# Patient Record
Sex: FEMALE | Race: BLACK OR AFRICAN AMERICAN | Employment: PART TIME | URBAN - METROPOLITAN AREA
[De-identification: names, ages, dates, MRNs, and addresses within clinical notes are randomized per-mention and may not be internally consistent; named-entity substitution may affect disease eponyms.]

---

## 2017-01-26 ENCOUNTER — TELEPHONE (OUTPATIENT)
Dept: NEUROLOGY | Age: 32
End: 2017-01-26

## 2017-01-27 DIAGNOSIS — F42.9 OCD (OBSESSIVE COMPULSIVE DISORDER): ICD-10-CM

## 2017-01-27 RX ORDER — FLUVOXAMINE MALEATE 50 MG/1
50 TABLET ORAL
Qty: 30 TAB | Refills: 5 | Status: SHIPPED | OUTPATIENT
Start: 2017-01-27 | End: 2017-03-14 | Stop reason: SDUPTHER

## 2017-03-14 ENCOUNTER — OFFICE VISIT (OUTPATIENT)
Dept: NEUROLOGY | Age: 32
End: 2017-03-14

## 2017-03-14 VITALS
HEART RATE: 82 BPM | BODY MASS INDEX: 29.96 KG/M2 | SYSTOLIC BLOOD PRESSURE: 120 MMHG | HEIGHT: 62 IN | DIASTOLIC BLOOD PRESSURE: 80 MMHG | OXYGEN SATURATION: 98 % | WEIGHT: 162.8 LBS

## 2017-03-14 DIAGNOSIS — G93.49 STATIC ENCEPHALOPATHY: ICD-10-CM

## 2017-03-14 DIAGNOSIS — G40.909 SEIZURE DISORDER (HCC): ICD-10-CM

## 2017-03-14 DIAGNOSIS — G40.909 NONINTRACTABLE EPILEPSY WITHOUT STATUS EPILEPTICUS, UNSPECIFIED EPILEPSY TYPE (HCC): ICD-10-CM

## 2017-03-14 DIAGNOSIS — G93.49 CHRONIC STATIC ENCEPHALOPATHY: ICD-10-CM

## 2017-03-14 DIAGNOSIS — F42.9 OCD (OBSESSIVE COMPULSIVE DISORDER): ICD-10-CM

## 2017-03-14 RX ORDER — LORATADINE 10 MG/1
10 TABLET ORAL
COMMUNITY
End: 2017-03-14

## 2017-03-14 RX ORDER — NORGESTIMATE AND ETHINYL ESTRADIOL 7DAYSX3 28
KIT ORAL
COMMUNITY
End: 2017-03-14

## 2017-03-14 RX ORDER — FLUVOXAMINE MALEATE 50 MG/1
50 TABLET ORAL
Qty: 30 TAB | Refills: 5 | Status: SHIPPED | OUTPATIENT
Start: 2017-03-14 | End: 2017-03-14 | Stop reason: SDUPTHER

## 2017-03-14 RX ORDER — FLUTICASONE PROPIONATE 50 MCG
2 SPRAY, SUSPENSION (ML) NASAL DAILY
COMMUNITY
End: 2018-09-12

## 2017-03-14 RX ORDER — OXCARBAZEPINE 600 MG/1
600 TABLET, FILM COATED ORAL 2 TIMES DAILY
Qty: 60 TAB | Refills: 5 | Status: SHIPPED | OUTPATIENT
Start: 2017-03-14 | End: 2017-09-12 | Stop reason: SDUPTHER

## 2017-03-14 RX ORDER — FLUVOXAMINE MALEATE 50 MG/1
50 TABLET ORAL
Qty: 30 TAB | Refills: 5 | Status: SHIPPED | OUTPATIENT
Start: 2017-03-14 | End: 2017-09-12 | Stop reason: SDUPTHER

## 2017-03-14 NOTE — MR AVS SNAPSHOT
Visit Information Date & Time Provider Department Dept. Phone Encounter #  
 3/14/2017  1:40 PM Louie Grossman MD [facility] [phone] [id number] Upcoming Health Maintenance Date Due DTaP/Tdap/Td series (1 - Tdap) 3/8/2004 PAP AKA CERVICAL CYTOLOGY 3/8/2004 INFLUENZA AGE 9 TO ADULT 8/1/2016 Allergies as of 3/14/2017  Review Complete On: 3/14/2017 By: Louie Grossman MD  
  
 Severity Noted Reaction Type Reactions Bees [Sting, Bee]  10/08/2011   Systemic Anaphylaxis Pertussis Vaccine,adsorbed  05/08/2014    Other (comments) Current Immunizations  Never Reviewed No immunizations on file. Not reviewed this visit Vitals BP Pulse Height(growth percentile) Weight(growth percentile) SpO2 BMI  
 120/80 82 5' 2\" (1.575 m) 162 lb 12.8 oz (73.8 kg) 98% 29.78 kg/m2 OB Status Smoking Status Having regular periods Never Smoker BMI and BSA Data Body Mass Index Body Surface Area  
 29.78 kg/m 2 1.8 m 2 Preferred Pharmacy Pharmacy Name Phone [facility] [phone] Your Updated Medication List  
  
   
This list is accurate as of: 3/14/17  1:55 PM.  Always use your most recent med list.  
  
  
  
  
 CALCIUM + D PO Take  by mouth. fluticasone 50 mcg/actuation nasal spray Commonly known as:  Montine Farber 2 Sprays by Both Nostrils route daily. fluvoxaMINE 50 mg tablet Commonly known as:  Claude Federico Take 1 Tab by mouth nightly. multivit,Ca,min-D3-herbal #181 1,000 unit Tab Take 1 tablet by mouth daily. OXcarbaxepine 600 mg tablet Commonly known as:  TRILEPTAL Take 1 tablet by mouth two (2) times a day. Patient Instructions 1. Fu in 6 months A Healthy Lifestyle: Care Instructions Your Care Instructions A healthy lifestyle can help you feel good, stay at a healthy weight, and have plenty of energy for both work and play. A healthy lifestyle is something you can share with your whole family. A healthy lifestyle also can lower your risk for serious health problems, such as high blood pressure, heart disease, and diabetes. You can follow a few steps listed below to improve your health and the health of your family. Follow-up care is a key part of your treatment and safety. Be sure to make and go to all appointments, and call your doctor if you are having problems. Its also a good idea to know your test results and keep a list of the medicines you take. How can you care for yourself at home? · Do not eat too much sugar, fat, or fast foods. You can still have dessert and treats now and then. The goal is moderation. · Start small to improve your eating habits. Pay attention to portion sizes, drink less juice and soda pop, and eat more fruits and vegetables. ¨ Eat a healthy amount of food. A 3-ounce serving of meat, for example, is about the size of a deck of cards. Fill the rest of your plate with vegetables and whole grains. ¨ Limit the amount of soda and sports drinks you have every day. Drink more water when you are thirsty. ¨ Eat at least 5 servings of fruits and vegetables every day. It may seem like a lot, but it is not hard to reach this goal. A serving or helping is 1 piece of fruit, 1 cup of vegetables, or 2 cups of leafy, raw vegetables. Have an apple or some carrot sticks as an afternoon snack instead of a candy bar. Try to have fruits and/or vegetables at every meal. 
· Make exercise part of your daily routine. You may want to start with simple activities, such as walking, bicycling, or slow swimming. Try to be active 30 to 60 minutes every day. You do not need to do all 30 to 60 minutes all at once.  For example, you can exercise 3 times a day for 10 or 20 minutes. Moderate exercise is safe for most people, but it is always a good idea to talk to your doctor before starting an exercise program. 
· Keep moving. Sylvia Clearys the lawn, work in the garden, or [employer]. Take the stairs instead of the elevator at work. · If you smoke, quit. People who smoke have an increased risk for heart attack, stroke, cancer, and other lung illnesses. Quitting is hard, but there are ways to boost your chance of quitting tobacco for good. ¨ Use nicotine gum, patches, or lozenges. ¨ Ask your doctor about stop-smoking programs and medicines. ¨ Keep trying. In addition to reducing your risk of diseases in the future, you will notice some benefits soon after you stop using tobacco. If you have shortness of breath or asthma symptoms, they will likely get better within a few weeks after you quit. · Limit how much alcohol you drink. Moderate amounts of alcohol (up to 2 drinks a day for men, 1 drink a day for women) are okay. But drinking too much can lead to liver problems, high blood pressure, and other health problems. Family health If you have a family, there are many things you can do together to improve your health. · Eat meals together as a family as often as possible. · Eat healthy foods. This includes fruits, vegetables, lean meats and dairy, and whole grains. · Include your family in your fitness plan. Most people think of activities such as jogging or tennis as the way to fitness, but there are many ways you and your family can be more active. Anything that makes you breathe hard and gets your heart pumping is exercise. Here are some tips: 
¨ Walk to do errands or to take your child to school or the bus. ¨ Go for a family bike ride after dinner instead of watching TV. Where can you learn more? Go to http://nazia-brina.info/. Enter V905 in the search box to learn more about \"A Healthy Lifestyle: Care Instructions. \" Current as of: July 26, 2016 Content Version: 11.1 © 5455-7291 [employer]. Care instructions adapted under license by [employer] (which disclaims liability or warranty for this information). If you have questions about a medical condition or this instruction, always ask your healthcare professional. [address] any warranty or liability for your use of this information. Please provide this summary of care documentation to your next provider. Your primary care clinician is listed as Santo Martinez. If you have any questions after today's visit, please call [phone].

## 2017-03-14 NOTE — PATIENT INSTRUCTIONS
1. Fu in 6 months     A Healthy Lifestyle: Care Instructions  Your Care Instructions  A healthy lifestyle can help you feel good, stay at a healthy weight, and have plenty of energy for both work and play. A healthy lifestyle is something you can share with your whole family. A healthy lifestyle also can lower your risk for serious health problems, such as high blood pressure, heart disease, and diabetes. You can follow a few steps listed below to improve your health and the health of your family. Follow-up care is a key part of your treatment and safety. Be sure to make and go to all appointments, and call your doctor if you are having problems. Its also a good idea to know your test results and keep a list of the medicines you take. How can you care for yourself at home? · Do not eat too much sugar, fat, or fast foods. You can still have dessert and treats now and then. The goal is moderation. · Start small to improve your eating habits. Pay attention to portion sizes, drink less juice and soda pop, and eat more fruits and vegetables. ¨ Eat a healthy amount of food. A 3-ounce serving of meat, for example, is about the size of a deck of cards. Fill the rest of your plate with vegetables and whole grains. ¨ Limit the amount of soda and sports drinks you have every day. Drink more water when you are thirsty. ¨ Eat at least 5 servings of fruits and vegetables every day. It may seem like a lot, but it is not hard to reach this goal. A serving or helping is 1 piece of fruit, 1 cup of vegetables, or 2 cups of leafy, raw vegetables. Have an apple or some carrot sticks as an afternoon snack instead of a candy bar. Try to have fruits and/or vegetables at every meal.  · Make exercise part of your daily routine. You may want to start with simple activities, such as walking, bicycling, or slow swimming. Try to be active 30 to 60 minutes every day. You do not need to do all 30 to 60 minutes all at once.  For example, you can exercise 3 times a day for 10 or 20 minutes. Moderate exercise is safe for most people, but it is always a good idea to talk to your doctor before starting an exercise program.  · Keep moving. Jocelyne Breaux the lawn, work in the garden, or [employer]. Take the stairs instead of the elevator at work. · If you smoke, quit. People who smoke have an increased risk for heart attack, stroke, cancer, and other lung illnesses. Quitting is hard, but there are ways to boost your chance of quitting tobacco for good. ¨ Use nicotine gum, patches, or lozenges. ¨ Ask your doctor about stop-smoking programs and medicines. ¨ Keep trying. In addition to reducing your risk of diseases in the future, you will notice some benefits soon after you stop using tobacco. If you have shortness of breath or asthma symptoms, they will likely get better within a few weeks after you quit. · Limit how much alcohol you drink. Moderate amounts of alcohol (up to 2 drinks a day for men, 1 drink a day for women) are okay. But drinking too much can lead to liver problems, high blood pressure, and other health problems. Family health  If you have a family, there are many things you can do together to improve your health. · Eat meals together as a family as often as possible. · Eat healthy foods. This includes fruits, vegetables, lean meats and dairy, and whole grains. · Include your family in your fitness plan. Most people think of activities such as jogging or tennis as the way to fitness, but there are many ways you and your family can be more active. Anything that makes you breathe hard and gets your heart pumping is exercise. Here are some tips:  ¨ Walk to do errands or to take your child to school or the bus. ¨ Go for a family bike ride after dinner instead of watching TV. Where can you learn more? Go to http://nazia-brina.info/.   Enter E363 in the search box to learn more about \"A Healthy Lifestyle: Care Instructions. \"  Current as of: July 26, 2016  Content Version: 11.1  © 2279-7448 [employer], [facility]. Care instructions adapted under license by [employer] (which disclaims liability or warranty for this information). If you have questions about a medical condition or this instruction, always ask your healthcare professional. [place] [address] any warranty or liability for your use of this information.

## 2017-03-14 NOTE — LETTER
3/14/2017 1:57 PM 
 
Patient:    Olga Husain YOB: 1985 Date of Visit:    3/14/2017 Dear Zaira Lovell MD 
 
Thank you for referring Ms. Olga Husain to me for evaluation/treatment. Below are the relevant portions of my assessment and plan of care. NEUROLOGY FOLLOW UP NOTE 
 
03/14/17 Chief Complaint Patient presents with  Follow-up Seizure none current Subjective Olga Husain is a 27 y.o. female who presented to the neurology office for management of seizures. She does have [facility] syndrome and was seizure free from [age] yrs of age. She is doing well on trileptal 600 mg po bid. She has not had a seizure in the last 2-3 yrs. She does have OCD and is on fluvoxamine 50 mg a day. She has gained 10 lbs in the last 6 months. She drinks 2 cans of soda a day and also is obsessed with chocolate. Current Outpatient Prescriptions Medication Sig  
 fluticasone (FLONASE) 50 mcg/actuation nasal spray 2 Sprays by Both Nostrils route daily.  fluvoxaMINE (LUVOX) 50 mg tablet Take 1 Tab by mouth nightly.  OXcarbaxepine (TRILEPTAL) 600 mg tablet Take 1 tablet by mouth two (2) times a day.  multivit,Ca,min-D3-herbal #181 1,000 unit tab Take 1 tablet by mouth daily.  CALCIUM CARBONATE/VITAMIN D3 (CALCIUM + D PO) Take  by mouth. No current facility-administered medications for this visit. Allergies Allergen Reactions  Bees [Sting, Bee] Anaphylaxis  Pertussis Vaccine,Adsorbed Other (comments) REVIEW OF SYSTEMS:  
A ten system review of constitutional, cardiovascular, respiratory, musculoskeletal, endocrine, skin, SHEENT, genitourinary, psychiatric and neurologic systems was obtained and is unremarkable except as stated in HPI EXAMINATION:  
Visit Vitals  /80  Pulse 82  Ht 5' 2\" (1.575 m)  Wt 162 lb 12.8 oz (73.8 kg)  SpO2 98%  BMI 29.78 kg/m2 General:  
General appearance: Pt is in no acute distress Distal pulses are preserved Neurological Examination:  
Mental Status: AA. Speech is fluent. Follows commands, has normal fund of knowledge, attention, short term recall, comprehension and insight. Cranial Nerves: Visual fields are full. PERRL, Extraocular movements are full. Facial sensation intact V1- V3. Facial movement intact, symmetric. Hearing intact to conversation. Palate elevates symmetrically. Shoulder shrug symmetric. Tongue midline. Motor: Strength is 5/5 in all 4 ext. Sensation: Normal to light touch Coordination/Cerebellar: Intact to finger-nose-finger Laboratory review: No results found for this or any previous visit. Imaging review:  
 
2/19/2013: EEG had right temporal sharp waves that were improved from spike and wave complexes. Documentation review: 
None Assessment/Plan:  
Morenita Kenney is a 27 y.o. 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 She resumed having seizures and is on trileptal 600 mg po bid. She is doing well with no seizures in the last 11 yrs. Will continue the same. Continue luvox for her OCD. Pt is obsessed with chocolates and soda. Pt will benefit by seeing psychiatry. I counseled the pt about diet. Fu in 6 months. 1. Chronic static encephalopathy Stable 2. Convulsions, unspecified convulsion type ([address] No seizures on trileptal 600 mg po bid. Will continue the same. 3. OCD (obsessive compulsive disorder) Her luvox was refilled for 6 months. 
 
- fluvoxaMINE (LUVOX) 50 mg tablet; Take 1 Tab by mouth nightly. Dispense: 30 Tab; Refill: 5 Fu in 6 months. Over 30 minutes was spent with the patient and family of which > 50% of the visit was spent counseling on diagnosis, management, and treatment of the diagnosis This note will not be viewable in [address] If you have questions, please do not hesitate to call me.   I look forward to following Ms. Roblero along with you. Sincerely, Christina Bansal MD

## 2017-03-14 NOTE — PROGRESS NOTES
NEUROLOGY FOLLOW UP NOTE    03/14/17    Chief Complaint   Patient presents with    Follow-up     Seizure none current       Subjective  Lissy Cuellar is a 27 y.o. female who presented to the neurology office for management of seizures. 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 She is doing well on trileptal 600 mg po bid. She has not had a seizure in the last 2-3 yrs. She does have OCD and is on fluvoxamine 50 mg a day. She has gained 10 lbs in the last 6 months. She drinks 2 cans of soda a day and also is obsessed with chocolate. Current Outpatient Prescriptions   Medication Sig    fluticasone (FLONASE) 50 mcg/actuation nasal spray 2 Sprays by Both Nostrils route daily.  fluvoxaMINE (LUVOX) 50 mg tablet Take 1 Tab by mouth nightly.  OXcarbaxepine (TRILEPTAL) 600 mg tablet Take 1 tablet by mouth two (2) times a day.  multivit,Ca,min-D3-herbal #181 1,000 unit tab Take 1 tablet by mouth daily.  CALCIUM CARBONATE/VITAMIN D3 (CALCIUM + D PO) Take  by mouth. No current facility-administered medications for this visit. Allergies   Allergen Reactions    Bees [Sting, Bee] Anaphylaxis    Pertussis Vaccine,Adsorbed Other (comments)       REVIEW OF SYSTEMS:   A ten system review of constitutional, cardiovascular, respiratory, musculoskeletal, endocrine, skin, SHEENT, genitourinary, psychiatric and neurologic systems was obtained and is unremarkable except as stated in HPI    EXAMINATION:   Visit Vitals    /80    Pulse 82    Ht 5' 2\" (1.575 m)    Wt 162 lb 12.8 oz (73.8 kg)    SpO2 98%    BMI 29.78 kg/m2        General:   General appearance: Pt is in no acute distress   Distal pulses are preserved    Neurological Examination:   Mental Status: AA. Speech is fluent. Follows commands, has normal fund of knowledge, attention, short term recall, comprehension and insight. Cranial Nerves: Visual fields are full. PERRL, Extraocular movements are full. Facial sensation intact V1- V3. Facial movement intact, symmetric. Hearing intact to conversation. Palate elevates symmetrically. Shoulder shrug symmetric. Tongue midline. Motor: Strength is 5/5 in all 4 ext. Sensation: Normal to light touch    Coordination/Cerebellar: Intact to finger-nose-finger     Laboratory review:   No results found for this or any previous visit. Imaging review:     2/19/2013: EEG had right temporal sharp waves that were improved from spike and wave complexes. Documentation review:  None    Assessment/Plan:   Mary Lundy is a 27 y.o. 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 She resumed having seizures and is on trileptal 600 mg po bid. She is doing well with no seizures in the last 11 yrs. Will continue the same. Continue luvox for her OCD. Pt is obsessed with chocolates and soda. Pt will benefit by seeing psychiatry. I counseled the pt about diet. Fu in 6 months. 1. Chronic static encephalopathy  Stable    2. Convulsions, unspecified convulsion type ([address]  No seizures on trileptal 600 mg po bid. Will continue the same. 3. OCD (obsessive compulsive disorder)  Her luvox was refilled for 6 months.    - fluvoxaMINE (LUVOX) 50 mg tablet; Take 1 Tab by mouth nightly. Dispense: 30 Tab; Refill: 5    Fu in 6 months. Over 30 minutes was spent with the patient and family of which > 50% of the visit was spent counseling on diagnosis, management, and treatment of the diagnosis      This note will not be viewable in [address]

## 2017-09-12 ENCOUNTER — OFFICE VISIT (OUTPATIENT)
Dept: NEUROLOGY | Age: 32
End: 2017-09-12

## 2017-09-12 VITALS
WEIGHT: 161.6 LBS | DIASTOLIC BLOOD PRESSURE: 72 MMHG | OXYGEN SATURATION: 94 % | SYSTOLIC BLOOD PRESSURE: 112 MMHG | HEART RATE: 76 BPM | HEIGHT: 62 IN | BODY MASS INDEX: 29.74 KG/M2

## 2017-09-12 DIAGNOSIS — G40.909 NONINTRACTABLE EPILEPSY WITHOUT STATUS EPILEPTICUS, UNSPECIFIED EPILEPSY TYPE (HCC): ICD-10-CM

## 2017-09-12 DIAGNOSIS — F42.9 OBSESSIVE-COMPULSIVE DISORDER, UNSPECIFIED TYPE: ICD-10-CM

## 2017-09-12 DIAGNOSIS — G93.49 STATIC ENCEPHALOPATHY: ICD-10-CM

## 2017-09-12 DIAGNOSIS — G93.49 CHRONIC STATIC ENCEPHALOPATHY: ICD-10-CM

## 2017-09-12 DIAGNOSIS — G40.909 SEIZURE DISORDER (HCC): ICD-10-CM

## 2017-09-12 RX ORDER — OXCARBAZEPINE 600 MG/1
600 TABLET, FILM COATED ORAL 2 TIMES DAILY
Qty: 60 TAB | Refills: 11 | Status: SHIPPED | OUTPATIENT
Start: 2017-09-12 | End: 2018-08-20 | Stop reason: SDUPTHER

## 2017-09-12 RX ORDER — LORATADINE 10 MG/1
10 TABLET ORAL
COMMUNITY
End: 2018-08-15

## 2017-09-12 RX ORDER — FLUVOXAMINE MALEATE 50 MG/1
50 TABLET ORAL
Qty: 30 TAB | Refills: 11 | Status: SHIPPED | OUTPATIENT
Start: 2017-09-12 | End: 2018-04-09 | Stop reason: SDUPTHER

## 2017-09-12 RX ORDER — MEDROXYPROGESTERONE ACETATE 150 MG/ML
150 INJECTION, SUSPENSION INTRAMUSCULAR ONCE
COMMUNITY
End: 2018-08-15

## 2017-09-12 NOTE — MR AVS SNAPSHOT
Visit Information Date & Time Provider Department Dept. Phone Encounter #  
 9/12/2017 10:40 AM Teto Tello MD [facility]rology [facility] at [facility] [phone] [id number] Upcoming Health Maintenance Date Due DTaP/Tdap/Td series (1 - Tdap) 3/8/2004 PAP AKA CERVICAL CYTOLOGY 3/8/2004 INFLUENZA AGE 9 TO ADULT 8/1/2017 Allergies as of 9/12/2017  Review Complete On: 9/12/2017 By: Teto Tello MD  
  
 Severity Noted Reaction Type Reactions Bees [Sting, Bee]  10/08/2011   Systemic Anaphylaxis Pertussis Vaccine,adsorbed  05/08/2014    Other (comments) Current Immunizations  Never Reviewed No immunizations on file. Not reviewed this visit You Were Diagnosed With   
  
 Codes Comments Chronic static encephalopathy     ICD-10-CM: G93.49 
ICD-9-CM: 348.39 Nonintractable epilepsy without status epilepticus, unspecified epilepsy type ([facility]     ICD-10-CM: G40.909 ICD-9-CM: 345.90 Static encephalopathy     ICD-10-CM: G93.49 
ICD-9-CM: 742.9 Seizure disorder ([facility]     ICD-10-CM: G40.909 ICD-9-CM: 345.90 Obsessive-compulsive disorder, unspecified type     ICD-10-CM: F42.9 ICD-9-CM: 300.3 Vitals BP Pulse Height(growth percentile) Weight(growth percentile) SpO2 BMI  
 112/72 76 5' 2\" (1.575 m) 161 lb 9.6 oz (73.3 kg) 94% 29.56 kg/m2 OB Status Smoking Status Having regular periods Never Smoker BMI and BSA Data Body Mass Index Body Surface Area  
 29.56 kg/m 2 1.79 m 2 Preferred Pharmacy Pharmacy Name Phone [facility] [phone] Your Updated Medication List  
  
   
This list is accurate as of: 9/12/17 11:36 AM.  Always use your most recent med list.  
  
  
  
  
 fluticasone 50 mcg/actuation nasal spray Commonly known as:  Mo Kenyon 2 Sprays by Both Nostrils route daily. fluvoxaMINE 50 mg tablet Commonly known as:  Mila Sterling Take 1 Tab by mouth nightly. loratadine 10 mg tablet Commonly known as:  Chaya Quijano Take 10 mg by mouth.  
  
 medroxyPROGESTERone 150 mg/mL injection Commonly known as:  DEPO-PROVERA  
150 mg by IntraMUSCular route once. OXcarbaxepine 600 mg tablet Commonly known as:  TRILEPTAL Take 1 Tab by mouth two (2) times a day. TRINESSA (28) PO Take  by mouth. Prescriptions Sent to Pharmacy Refills OXcarbaxepine (TRILEPTAL) 600 mg tablet 11 Sig: Take 1 Tab by mouth two (2) times a day. Class: Normal  
 Pharmacy: [address] [place] [address] Ph #: [phone] Route: Oral  
 fluvoxaMINE (LUVOX) 50 mg tablet 11 Sig: Take 1 Tab by mouth nightly. Class: Normal  
 Pharmacy: [address] [place] [address] Ph #: [phone] Route: Oral  
  
Patient Instructions   
follow up in 1 yr A Healthy Lifestyle: Care Instructions Your Care Instructions A healthy lifestyle can help you feel good, stay at a healthy weight, and have plenty of energy for both work and play. A healthy lifestyle is something you can share with your whole family. A healthy lifestyle also can lower your risk for serious health problems, such as high blood pressure, heart disease, and diabetes. You can follow a few steps listed below to improve your health and the health of your family. Follow-up care is a key part of your treatment and safety. Be sure to make and go to all appointments, and call your doctor if you are having problems. Its also a good idea to know your test results and keep a list of the medicines you take. How can you care for yourself at home? · Do not eat too much sugar, fat, or fast foods. You can still have dessert and treats now and then. The goal is moderation. · Start small to improve your eating habits.  Pay attention to portion sizes, drink less juice and soda pop, and eat more fruits and vegetables. ¨ Eat a healthy amount of food. A 3-ounce serving of meat, for example, is about the size of a deck of cards. Fill the rest of your plate with vegetables and whole grains. ¨ Limit the amount of soda and sports drinks you have every day. Drink more water when you are thirsty. ¨ Eat at least 5 servings of fruits and vegetables every day. It may seem like a lot, but it is not hard to reach this goal. A serving or helping is 1 piece of fruit, 1 cup of vegetables, or 2 cups of leafy, raw vegetables. Have an apple or some carrot sticks as an afternoon snack instead of a candy bar. Try to have fruits and/or vegetables at every meal. 
· Make exercise part of your daily routine. You may want to start with simple activities, such as walking, bicycling, or slow swimming. Try to be active 30 to 60 minutes every day. You do not need to do all 30 to 60 minutes all at once. For example, you can exercise 3 times a day for 10 or 20 minutes. Moderate exercise is safe for most people, but it is always a good idea to talk to your doctor before starting an exercise program. 
· Keep moving. Stephanie Mike the lawn, work in the garden, or Strobe. Take the stairs instead of the elevator at work. · If you smoke, quit. People who smoke have an increased risk for heart attack, stroke, cancer, and other lung illnesses. Quitting is hard, but there are ways to boost your chance of quitting tobacco for good. ¨ Use nicotine gum, patches, or lozenges. ¨ Ask your doctor about stop-smoking programs and medicines. ¨ Keep trying. In addition to reducing your risk of diseases in the future, you will notice some benefits soon after you stop using tobacco. If you have shortness of breath or asthma symptoms, they will likely get better within a few weeks after you quit. · Limit how much alcohol you drink.  Moderate amounts of alcohol (up to 2 drinks a day for men, 1 drink a day for women) are okay. But drinking too much can lead to liver problems, high blood pressure, and other health problems. Family health If you have a family, there are many things you can do together to improve your health. · Eat meals together as a family as often as possible. · Eat healthy foods. This includes fruits, vegetables, lean meats and dairy, and whole grains. · Include your family in your fitness plan. Most people think of activities such as jogging or tennis as the way to fitness, but there are many ways you and your family can be more active. Anything that makes you breathe hard and gets your heart pumping is exercise. Here are some tips: 
¨ Walk to do errands or to take your child to school or the bus. ¨ Go for a family bike ride after dinner instead of watching TV. Where can you learn more? Go to http://nazia-brina.info/. Enter F562 in the search box to learn more about \"A Healthy Lifestyle: Care Instructions. \" Current as of: July 26, 2016 Content Version: 11.3 © 8414-0340 ViewReple. Care instructions adapted under license by [employer] (which disclaims liability or warranty for this information). If you have questions about a medical condition or this instruction, always ask your healthcare professional. [address] any warranty or liability for your use of this information. Introducing [facility]! Dear Manjeet Alexandre: Thank you for requesting a Grivy account. Our records indicate that you have previously registered for a Grivy account but its currently inactive. Please call our Grivy support line at [phone]. Additional Information If you have questions, please visit the Frequently Asked Questions section of the Grivy website at [email or web address] Remember, Grivy is NOT to be used for urgent needs. For medical emergencies, dial 911. Now available from your iPhone and Android! Please provide this summary of care documentation to your next provider. Your primary care clinician is listed as Leah Peña. If you have any questions after today's visit, please call [phone].

## 2017-09-12 NOTE — LETTER
9/12/2017 11:39 AM 
 
Patient:    Gayathri Oates YOB: 1985 Date of Visit:    9/12/2017 Dear Olesya Ring MD 
 
Thank you for referring Ms. Gayathri Oates to me for evaluation/treatment. Below are the relevant portions of my assessment and plan of care. NEUROLOGY FOLLOW UP NOTE 
 
09/12/17 Chief Complaint Patient presents with  Follow-up Seizures none Subjective Gayathri Oates is a 27 y.o. female who presented to the neurology office for management of seizures. She does have [place] syndrome and was seizure free from [age] yrs of age. She is doing well on trileptal 600 mg po bid. She has not had a seizure atleast since 2013. She does have OCD and is on fluvoxamine 50 mg a day. She has gained 10 lbs in the last 6 months. She drinks 2 cans of soda a day and also is obsessed with chocolate. Current Outpatient Prescriptions Medication Sig  
 medroxyPROGESTERone (DEPO-PROVERA) 150 mg/mL injection 150 mg by IntraMUSCular route once.  loratadine (CLARITIN) 10 mg tablet Take 10 mg by mouth.  NORGESTIMATE-ETHINYL ESTRADIOL (TRINESSA, 28, PO) Take  by mouth.  OXcarbaxepine (TRILEPTAL) 600 mg tablet Take 1 Tab by mouth two (2) times a day.  fluvoxaMINE (LUVOX) 50 mg tablet Take 1 Tab by mouth nightly.  fluticasone (FLONASE) 50 mcg/actuation nasal spray 2 Sprays by Both Nostrils route daily. No current facility-administered medications for this visit. Allergies Allergen Reactions  Bees [Sting, Bee] Anaphylaxis  Pertussis Vaccine,Adsorbed Other (comments) REVIEW OF SYSTEMS:  
A ten system review of constitutional, cardiovascular, respiratory, musculoskeletal, endocrine, skin, SHEENT, genitourinary, psychiatric and neurologic systems was obtained and is unremarkable except as stated in HPI EXAMINATION:  
Visit Vitals  /72  Pulse 76  Ht 5' 2\" (1.575 m)  Wt 161 lb 9.6 oz (73.3 kg)  SpO2 94%  BMI 29.56 kg/m2 General:  
General appearance: Pt is in no acute distress Distal pulses are preserved Neurological Examination:  
Mental Status: AA. Speech is fluent. Follows commands, has normal fund of knowledge, attention, short term recall, comprehension and insight. Cranial Nerves: Visual fields are full. PERRL, Extraocular movements are full. Facial sensation intact V1- V3. Facial movement intact, symmetric. Hearing intact to conversation. Palate elevates symmetrically. Shoulder shrug symmetric. Tongue midline. Motor: Strength is 5/5 in all 4 ext. Sensation: Normal to light touch Coordination/Cerebellar: Intact to finger-nose-finger Laboratory review: No results found for this or any previous visit. Imaging review:  
 
2/19/2013: EEG had right temporal sharp waves that were improved from spike and wave complexes. Documentation review: 
None Assessment/Plan:  
Sara Waldrop is a 27 y.o. 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 She resumed having seizures and is on trileptal 600 mg po bid. She is doing well with no seizures atleast since 2013. Will continue the same meds. Juan Luislety Amy Continue luvox for her OCD. Fu in 12 months. ICD-10-CM ICD-9-CM 1. Chronic static encephalopathy G93.49 348.39 OXcarbaxepine (TRILEPTAL) 600 mg tablet 2. Nonintractable epilepsy without status epilepticus, unspecified epilepsy type ([facility] G40.909 345.90 OXcarbaxepine (TRILEPTAL) 600 mg tablet 3. Static encephalopathy G93.49 742.9 OXcarbaxepine (TRILEPTAL) 600 mg tablet 4. Seizure disorder (HCC) G40.909 345.90 OXcarbaxepine (TRILEPTAL) 600 mg tablet 5. Obsessive-compulsive disorder, unspecified type F42.9 300.3 fluvoxaMINE (LUVOX) 50 mg tablet This note will not be viewable in [address] If you have questions, please do not hesitate to call me. I look forward to following Ms. Roblero along with you.  
 
Sincerely, 
 Jarett Graves MD

## 2017-09-12 NOTE — PATIENT INSTRUCTIONS
follow up in 1 yr     A Healthy Lifestyle: Care Instructions  Your Care Instructions  A healthy lifestyle can help you feel good, stay at a healthy weight, and have plenty of energy for both work and play. A healthy lifestyle is something you can share with your whole family. A healthy lifestyle also can lower your risk for serious health problems, such as high blood pressure, heart disease, and diabetes. You can follow a few steps listed below to improve your health and the health of your family. Follow-up care is a key part of your treatment and safety. Be sure to make and go to all appointments, and call your doctor if you are having problems. Its also a good idea to know your test results and keep a list of the medicines you take. How can you care for yourself at home? · Do not eat too much sugar, fat, or fast foods. You can still have dessert and treats now and then. The goal is moderation. · Start small to improve your eating habits. Pay attention to portion sizes, drink less juice and soda pop, and eat more fruits and vegetables. ¨ Eat a healthy amount of food. A 3-ounce serving of meat, for example, is about the size of a deck of cards. Fill the rest of your plate with vegetables and whole grains. ¨ Limit the amount of soda and sports drinks you have every day. Drink more water when you are thirsty. ¨ Eat at least 5 servings of fruits and vegetables every day. It may seem like a lot, but it is not hard to reach this goal. A serving or helping is 1 piece of fruit, 1 cup of vegetables, or 2 cups of leafy, raw vegetables. Have an apple or some carrot sticks as an afternoon snack instead of a candy bar. Try to have fruits and/or vegetables at every meal.  · Make exercise part of your daily routine. You may want to start with simple activities, such as walking, bicycling, or slow swimming. Try to be active 30 to 60 minutes every day. You do not need to do all 30 to 60 minutes all at once.  For example, you can exercise 3 times a day for 10 or 20 minutes. Moderate exercise is safe for most people, but it is always a good idea to talk to your doctor before starting an exercise program.  · Keep moving. Valarie Bi the lawn, work in the garden, or Campaign [employer]. Take the stairs instead of the elevator at work. · If you smoke, quit. People who smoke have an increased risk for heart attack, stroke, cancer, and other lung illnesses. Quitting is hard, but there are ways to boost your chance of quitting tobacco for good. ¨ Use nicotine gum, patches, or lozenges. ¨ Ask your doctor about stop-smoking programs and medicines. ¨ Keep trying. In addition to reducing your risk of diseases in the future, you will notice some benefits soon after you stop using tobacco. If you have shortness of breath or asthma symptoms, they will likely get better within a few weeks after you quit. · Limit how much alcohol you drink. Moderate amounts of alcohol (up to 2 drinks a day for men, 1 drink a day for women) are okay. But drinking too much can lead to liver problems, high blood pressure, and other health problems. Family health  If you have a family, there are many things you can do together to improve your health. · Eat meals together as a family as often as possible. · Eat healthy foods. This includes fruits, vegetables, lean meats and dairy, and whole grains. · Include your family in your fitness plan. Most people think of activities such as jogging or tennis as the way to fitness, but there are many ways you and your family can be more active. Anything that makes you breathe hard and gets your heart pumping is exercise. Here are some tips:  ¨ Walk to do errands or to take your child to school or the bus. ¨ Go for a family bike ride after dinner instead of watching TV. Where can you learn more? Go to http://nazia-brina.info/.   Enter Y117 in the search box to learn more about \"A Healthy Lifestyle: Care Instructions. \"  Current as of: July 26, 2016  Content Version: 11.3  © 6991-2020 [employer], [facility]. Care instructions adapted under license by [employer] (which disclaims liability or warranty for this information). If you have questions about a medical condition or this instruction, always ask your healthcare professional. [place] [address] any warranty or liability for your use of this information.

## 2017-09-12 NOTE — PROGRESS NOTES
NEUROLOGY FOLLOW UP NOTE    09/12/17    Chief Complaint   Patient presents with    Follow-up     Seizures none       Subjective  Mae Dozier is a 27 y.o. female who presented to the neurology office for management of seizures. She does have [place] syndrome and was seizure free from [age] yrs of age. She is doing well on trileptal 600 mg po bid. She has not had a seizure atleast since 2013. She does have OCD and is on fluvoxamine 50 mg a day. She has gained 10 lbs in the last 6 months. She drinks 2 cans of soda a day and also is obsessed with chocolate. Current Outpatient Prescriptions   Medication Sig    medroxyPROGESTERone (DEPO-PROVERA) 150 mg/mL injection 150 mg by IntraMUSCular route once.  loratadine (CLARITIN) 10 mg tablet Take 10 mg by mouth.  NORGESTIMATE-ETHINYL ESTRADIOL (TRINESSA, 28, PO) Take  by mouth.  OXcarbaxepine (TRILEPTAL) 600 mg tablet Take 1 Tab by mouth two (2) times a day.  fluvoxaMINE (LUVOX) 50 mg tablet Take 1 Tab by mouth nightly.  fluticasone (FLONASE) 50 mcg/actuation nasal spray 2 Sprays by Both Nostrils route daily. No current facility-administered medications for this visit. Allergies   Allergen Reactions    Bees [Sting, Bee] Anaphylaxis    Pertussis Vaccine,Adsorbed Other (comments)       REVIEW OF SYSTEMS:   A ten system review of constitutional, cardiovascular, respiratory, musculoskeletal, endocrine, skin, SHEENT, genitourinary, psychiatric and neurologic systems was obtained and is unremarkable except as stated in HPI    EXAMINATION:   Visit Vitals    /72    Pulse 76    Ht 5' 2\" (1.575 m)    Wt 161 lb 9.6 oz (73.3 kg)    SpO2 94%    BMI 29.56 kg/m2        General:   General appearance: Pt is in no acute distress   Distal pulses are preserved    Neurological Examination:   Mental Status: AA. Speech is fluent. Follows commands, has normal fund of knowledge, attention, short term recall, comprehension and insight.      Cranial Nerves: Visual fields are full. PERRL, Extraocular movements are full. Facial sensation intact V1- V3. Facial movement intact, symmetric. Hearing intact to conversation. Palate elevates symmetrically. Shoulder shrug symmetric. Tongue midline. Motor: Strength is 5/5 in all 4 ext. Sensation: Normal to light touch    Coordination/Cerebellar: Intact to finger-nose-finger     Laboratory review:   No results found for this or any previous visit. Imaging review:     2/19/2013: EEG had right temporal sharp waves that were improved from spike and wave complexes. Documentation review:  None    Assessment/Plan:   Tra Marr is a 27 y.o. 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 She resumed having seizures and is on trileptal 600 mg po bid. She is doing well with no seizures atleast since 2013. Will continue the same meds. .    Continue luvox for her OCD. Fu in 12 months. ICD-10-CM ICD-9-CM    1. Chronic static encephalopathy G93.49 348.39 OXcarbaxepine (TRILEPTAL) 600 mg tablet   2. Nonintractable epilepsy without status epilepticus, unspecified epilepsy type ([facility] G40.909 345.90 OXcarbaxepine (TRILEPTAL) 600 mg tablet   3. Static encephalopathy G93.49 742.9 OXcarbaxepine (TRILEPTAL) 600 mg tablet   4. Seizure disorder (HCC) G40.909 345.90 OXcarbaxepine (TRILEPTAL) 600 mg tablet   5. Obsessive-compulsive disorder, unspecified type F42.9 300.3 fluvoxaMINE (LUVOX) 50 mg tablet       This note will not be viewable in [facility] [employer].

## 2018-04-06 ENCOUNTER — TELEPHONE (OUTPATIENT)
Dept: NEUROLOGY | Age: 33
End: 2018-04-06

## 2018-04-06 NOTE — TELEPHONE ENCOUNTER
Spoke with patient's mother Ms. Barcenas to let her known that I will forward her concern regarding patient to Dr. Ayush Garcia. Mother states she will be in the area on Tuesday and will bring daughter by to update her HIPPA form. Mother states patient (daughter) lives in a facility and she has mental issues that she would like to talk to Dr. Ayush Garcia about her medication fluvoxamine (Luvox).

## 2018-04-06 NOTE — TELEPHONE ENCOUNTER
Spoke with  regarding patient HIPPA being . Patient HIPPA form was done on 3/14/2017. I will call patient to let her known that to come to the office and update her HIPPA form before her next appointment on 18.

## 2018-04-06 NOTE — TELEPHONE ENCOUNTER
Dr. Yanet Rothman patient's mother Ms. Roblero would like to talk to you regarding patient weight gain, acting out, stealing other people food and depression. Want to discuss possibly discontinuing the medication Fluvoxamine (Luvox). She can be reached at [phone].

## 2018-04-06 NOTE — TELEPHONE ENCOUNTER
Received call from patient's mother, she stated that she is concerned that one of the medications that the patient is taking is causing her to gain weight, she also stated that she believes it is causing her to act out, steal other people's food, and depression. She would like a call back to discuss possibily discontinuing the medication.       [phone]

## 2018-04-09 DIAGNOSIS — F42.9 OBSESSIVE-COMPULSIVE DISORDER, UNSPECIFIED TYPE: ICD-10-CM

## 2018-04-09 RX ORDER — FLUVOXAMINE MALEATE 25 MG/1
TABLET ORAL
Qty: 7 TAB | Refills: 0 | Status: SHIPPED | OUTPATIENT
Start: 2018-04-09 | End: 2018-09-12

## 2018-04-11 ENCOUNTER — DOCUMENTATION ONLY (OUTPATIENT)
Dept: NEUROLOGY | Age: 33
End: 2018-04-11

## 2018-04-13 ENCOUNTER — TELEPHONE (OUTPATIENT)
Dept: NEUROLOGY | Age: 33
End: 2018-04-13

## 2018-04-13 NOTE — TELEPHONE ENCOUNTER
Message from David 55:    Please call , another rx was put in for a medication that she was told to stop taking.

## 2018-08-13 NOTE — PERIOP NOTES
Call to number on chart. PT 's mother answered states her dtr is\" handicapped\"  Lives at [facility] in [facility] [place] [facility]. [phone]  Mother does not have guardianship or POA. Will have to speak to pt to receive permission to speak with patient     Ct to [address] [facility] spoke with Stephan Mays. Pt is currently at work will be back after 4 pm     Hippa form is currently on file at [employer] neurologist office and [phone] S[address]umar Montaño to allow information to be discussed with mother.

## 2018-08-14 NOTE — PERIOP NOTES
Spoke with patient verified     Mets > 4 permission received to speak to pt's mother to obtain medical history.  Sheela Squires

## 2018-08-15 NOTE — PERIOP NOTES
[facility]  Ambulatory Surgery Unit  Pre-operative Instructions    Surgery/Procedure Date  08/21/18            Tentative Arrival Time 0930      1. On the day of your surgery/procedure, please report to the Ambulatory Surgery Unit Registration Desk and sign in at your designated time. The Ambulatory Surgery Unit is located in [facility] on the [facility] side of the [facility] across from the [address] [place] [address]. Please have all of your health insurance cards and a photo ID. 2. You must have someone with you to drive you home, as you should not drive a car for 24 hours following anesthesia. Please make arrangements for a responsible adult friend or family member to stay with you for at least the first 24 hours after your surgery. 3. Do not have anything to eat or drink (including water, gum, mints, coffee, juice) after midnight   08/20/18. This may not apply to medications prescribed by your physician. (Please note below the special instructions with medications to take the morning of surgery, if applicable.)    4. We recommend you do not drink any alcoholic beverages for 24 hours before and after your surgery. 5. Contact your surgeons office for instructions on the following medications: non-steroidal anti-inflammatory drugs (i.e. Advil, Aleve), vitamins, and supplements. (Some surgeons will want you to stop these medications prior to surgery and others may allow you to take them)   **If you are currently taking Plavix, Coumadin, Aspirin and/or other blood-thinning agents, contact your surgeon for instructions. ** Your surgeon will partner with the physician prescribing these medications to determine if it is safe to stop or if you need to continue taking. Please do not stop taking these medications without instructions from your surgeon.     6. In an effort to help prevent surgical site infection, we ask that you shower with an anti-bacterial soap (i.e. Dial or Safeguard) for 3 days prior to and on the morning of surgery, using a fresh towel after each shower. (Please begin this process with fresh bed linens.) Do not apply any lotions, powders, or deodorants after the shower on the day of your procedure. If applicable, please do not shave the operative site for 48 hours prior to surgery. 7. Wear comfortable clothes. Wear glasses instead of contacts. Do not bring any jewelry or money (other than copays or fees as instructed). Do not wear make-up, particularly mascara, the morning of your surgery. Do not wear nail polish, particularly if you are having foot /hand surgery. Wear your hair loose or down, no ponytails, buns, reg pins or clips. All body piercings must be removed. 8. You should understand that if you do not follow these instructions your surgery may be cancelled. If your physical condition changes (i.e. fever, cold or flu) please contact your surgeon as soon as possible. 9. It is important that you be on time. If a situation occurs where you may be late, or if you have any questions or problems, please call [phone].    10. Your surgery time may be subject to change. You will receive a phone call the day prior to surgery to confirm your arrival time. 11. Pediatric patients: please bring a change of clothes, diapers, bottle/sippy cup, pacifier, etc.      Special Instructions: Take all medications and inhalers, as prescribed, on the morning of surgery with a sip of water EXCEPT: n/a      Insulin Dependent Diabetic patients: Take your diabetic medications as prescribed the day before surgery. Hold all diabetic medications the day of surgery. If you are scheduled to arrive for surgery after 8:00 AM, and your AM blood sugar is >200, please call Ambulatory Surgery. I understand a pre-operative phone call will be made to verify my surgery time.   In the event that I am not available, I give permission for a message to be left on my answering service and/or with another person?       Yes        Preop instructions reviewed  Pt verbalized understanding.    ___________________      ___________________      ________________  (Signature of Patient)          (Witness)                   (Date and Time)

## 2018-08-20 ENCOUNTER — ANESTHESIA EVENT (OUTPATIENT)
Dept: SURGERY | Age: 33
End: 2018-08-20
Payer: MEDICARE

## 2018-08-20 DIAGNOSIS — G40.909 NONINTRACTABLE EPILEPSY WITHOUT STATUS EPILEPTICUS, UNSPECIFIED EPILEPSY TYPE (HCC): ICD-10-CM

## 2018-08-20 DIAGNOSIS — G40.909 SEIZURE DISORDER (HCC): ICD-10-CM

## 2018-08-20 DIAGNOSIS — G93.49 STATIC ENCEPHALOPATHY: ICD-10-CM

## 2018-08-20 DIAGNOSIS — G93.49 CHRONIC STATIC ENCEPHALOPATHY: ICD-10-CM

## 2018-08-20 RX ORDER — OXCARBAZEPINE 600 MG/1
600 TABLET, FILM COATED ORAL 2 TIMES DAILY
Qty: 60 TAB | Refills: 11 | Status: SHIPPED | OUTPATIENT
Start: 2018-08-20 | End: 2018-08-23 | Stop reason: SDUPTHER

## 2018-08-20 RX ORDER — DIPHENHYDRAMINE HYDROCHLORIDE 50 MG/ML
12.5 INJECTION, SOLUTION INTRAMUSCULAR; INTRAVENOUS AS NEEDED
Status: CANCELLED | OUTPATIENT
Start: 2018-08-20 | End: 2018-08-20

## 2018-08-20 RX ORDER — MORPHINE SULFATE 10 MG/ML
2 INJECTION, SOLUTION INTRAMUSCULAR; INTRAVENOUS
Status: CANCELLED | OUTPATIENT
Start: 2018-08-20

## 2018-08-20 RX ORDER — HYDROMORPHONE HYDROCHLORIDE 1 MG/ML
.2-.5 INJECTION, SOLUTION INTRAMUSCULAR; INTRAVENOUS; SUBCUTANEOUS ONCE
Status: CANCELLED | OUTPATIENT
Start: 2018-08-20 | End: 2018-08-20

## 2018-08-20 RX ORDER — OXYCODONE AND ACETAMINOPHEN 5; 325 MG/1; MG/1
1 TABLET ORAL
Status: CANCELLED | OUTPATIENT
Start: 2018-08-20 | End: 2018-08-21

## 2018-08-20 RX ORDER — ONDANSETRON 2 MG/ML
4 INJECTION INTRAMUSCULAR; INTRAVENOUS AS NEEDED
Status: CANCELLED | OUTPATIENT
Start: 2018-08-20

## 2018-08-20 RX ORDER — SODIUM CHLORIDE 0.9 % (FLUSH) 0.9 %
5-10 SYRINGE (ML) INJECTION AS NEEDED
Status: CANCELLED | OUTPATIENT
Start: 2018-08-20

## 2018-08-20 RX ORDER — SODIUM CHLORIDE, SODIUM LACTATE, POTASSIUM CHLORIDE, CALCIUM CHLORIDE 600; 310; 30; 20 MG/100ML; MG/100ML; MG/100ML; MG/100ML
25 INJECTION, SOLUTION INTRAVENOUS CONTINUOUS
Status: CANCELLED | OUTPATIENT
Start: 2018-08-20

## 2018-08-20 RX ORDER — FENTANYL CITRATE 50 UG/ML
25 INJECTION, SOLUTION INTRAMUSCULAR; INTRAVENOUS
Status: CANCELLED | OUTPATIENT
Start: 2018-08-20

## 2018-08-20 NOTE — H&P
Vital Signs   26Years Old Female  Height:  62 inches  Weight: 168. 2 pounds  BMI:      30.88  BSA:      1.78  BP:       118/78    Past Pregnancy History   : 0  Para:     0  Aborta:  0  Term: 0, Premature: 0, Living Children: 0, Vaginal Deliveries: 0, C-Sections: 0, Elect. Ab: 0, Ectopics: 0    Gynecologic History   Last Menstrual Period: 2018  Additional Menses Information: irregular; heavy; long   Does patient have any problems with urine leakage? no  Does patient have any other bladder problems? no  History of abnormal pap: no  Gardasil Injection History: Not Applicable  Pt currently sexually active: no  Pt ever sexually active: no  Current Contraception: None. History of STD: no   Flu Vaccination Status : Chel Ba. Elsewhere      Visit Type:  Problem GYN  Primary Provider:  Regine Sandoval MD    CC:  Irregular Bleeding; heavy. History of Present Illness:  26 yrs old G0 for Hysteroscopy, Novasure endometrial ablation and D&C for menorraghia   She has had her cycle every 3weeks, lasts 8 days  In  she was given ocps to try to help regulate cycles but she never took them.  last year she wanted to try Depo Provera, she did one injection last August but continued to have bleeding  She tried Nexplanon about 10 years- she had frequent irregular bleeding  no pain sxs except cramps with period  she has never been sexually active and lives in an assisted living situation  Ultrasound- normal uterus with 9 mm stripe    Allergies    * BEE STINGS (Critical)      Medications Removed from Medication List    DEPO-PROVERA 150 MG/ML INTRAMUSCULAR SUSPENSION (MEDROXYPROGESTERONE ACETATE) 150 mg IM q 12 weeks  FLUVOXAMINE MALEATE 50 MG ORAL TABLET (FLUVOXAMINE MALEATE) Prescribed by non 606/706 Sivakumar Montaño MD          Past Medical History:     Reviewed history from 2015 and no changes required:        seasonal allergies         seizure disorder        calcium deficiency            Past Surgical History: Reviewed history from 05/19/2015 and no changes required:        back surgery - pins/screws along spine        eye surgery     Family History Summary:      Reviewed history Last on 08/07/2017 and no changes required:07/10/2018  No Known Family History - Entered On: 5/19/2015    General Comments - FH:  patient is adopted    Social History:     Reviewed history from 05/19/2015 and no changes required:        Lives in assisted living situation      Risk Factors:     Smoked Tobacco Use:  Never smoker  Caffeine use:  2 drinks per day  Alcohol use:  no  Exercise:  yes     Times per week:  2-3     Type of Exercise:  walking, bike, gym   Seatbelt use:  100 %  Sun Exposure:  occasionally         Review of Systems        See HPI    Except as noted in the HPI, the review of systems is negative for General and . General Medical Physical Exam:     Genitourinary:        Ext. genitalia: normal appearance; no lesions or discharge       Urethra:  no discharge       Bladder:  no cystocele       Vagina:  normal appearing without lesions or discharge       Cervix:  difficult to visualize due to patient discomfort       Uterus:  normal size and position; no masses       Adnexa:  no masses or tenderness            Impression & Recommendations:    Problem # 1:  Menorrhagia (ICD-626.2) ([id number]-K88.0)  We discussed treatment options for menorraghia including medical and surgical and patient desires to proceed with Novasure endometrial ablation. Risks of surgery reviewed including bleeding, infection, damage to surrounding organs including uterus, bowel, bladder, ureters, nerves, vessels  Success rates discussed. Post-op care and recovery reviewed. Medications (at conclusion of this visit)    05/19/2015 OXCARBAZEPINE 600 MG ORAL TABLET (OXCARBAZEPINE) Prescribed by non 606/706 Sivakumar Montaño MD                  Date of Surgery Update:  Shahzad Caballero was seen and examined. History and physical has been reviewed. The patient has been examined. There have been no significant clinical changes since the completion of the originally dated History and Physical.    Signed By: Saeid Nicole MD     August 21, 2018 10:17 AM

## 2018-08-20 NOTE — TELEPHONE ENCOUNTER
Received refill request from [facility] pharmacy for Oxcarbaxepine   last filled 9/12/2017  Dr. Charlie Cat do you want to refill ?

## 2018-08-21 ENCOUNTER — HOSPITAL ENCOUNTER (OUTPATIENT)
Age: 33
Setting detail: OUTPATIENT SURGERY
Discharge: HOME | End: 2018-08-21
Attending: OBSTETRICS & GYNECOLOGY | Admitting: OBSTETRICS & GYNECOLOGY
Payer: MEDICARE

## 2018-08-21 ENCOUNTER — ANESTHESIA (OUTPATIENT)
Dept: SURGERY | Age: 33
End: 2018-08-21
Payer: MEDICARE

## 2018-08-21 VITALS
RESPIRATION RATE: 17 BRPM | SYSTOLIC BLOOD PRESSURE: 123 MMHG | HEART RATE: 84 BPM | HEIGHT: 62 IN | TEMPERATURE: 98 F | DIASTOLIC BLOOD PRESSURE: 87 MMHG | WEIGHT: 166 LBS | OXYGEN SATURATION: 98 % | BODY MASS INDEX: 30.55 KG/M2

## 2018-08-21 LAB
HCG UR QL: NEGATIVE
HGB BLD-MCNC: 13.2 G/DL (ref 11.5–16)

## 2018-08-21 PROCEDURE — 76030000000 HC AMB SURG OR TIME 0.5 TO 1: Performed by: OBSTETRICS & GYNECOLOGY

## 2018-08-21 PROCEDURE — 77030012961 HC IRR KT CYSTO/TUR ICUM -A: Performed by: OBSTETRICS & GYNECOLOGY

## 2018-08-21 PROCEDURE — 85018 HEMOGLOBIN: CPT

## 2018-08-21 PROCEDURE — 74011000250 HC RX REV CODE- 250: Performed by: OBSTETRICS & GYNECOLOGY

## 2018-08-21 PROCEDURE — 74011250636 HC RX REV CODE- 250/636: Performed by: ANESTHESIOLOGY

## 2018-08-21 PROCEDURE — 81025 URINE PREGNANCY TEST: CPT

## 2018-08-21 PROCEDURE — 76210000046 HC AMBSU PH II REC FIRST 0.5 HR: Performed by: OBSTETRICS & GYNECOLOGY

## 2018-08-21 PROCEDURE — 77030020255 HC SOL INJ LR 1000ML BG: Performed by: OBSTETRICS & GYNECOLOGY

## 2018-08-21 PROCEDURE — 77030021352 HC CBL LD SYS DISP COVD -B: Performed by: OBSTETRICS & GYNECOLOGY

## 2018-08-21 PROCEDURE — 74011250636 HC RX REV CODE- 250/636

## 2018-08-21 PROCEDURE — 77030003666 HC NDL SPINAL BD -A: Performed by: OBSTETRICS & GYNECOLOGY

## 2018-08-21 PROCEDURE — 77030018846 HC SOL IRR STRL H20 ICUM -A: Performed by: OBSTETRICS & GYNECOLOGY

## 2018-08-21 PROCEDURE — 77030018836 HC SOL IRR NACL ICUM -A: Performed by: OBSTETRICS & GYNECOLOGY

## 2018-08-21 PROCEDURE — 88305 TISSUE EXAM BY PATHOLOGIST: CPT | Performed by: OBSTETRICS & GYNECOLOGY

## 2018-08-21 PROCEDURE — 77030038020 HC MANFLD NEPTUNE STRY -B: Performed by: OBSTETRICS & GYNECOLOGY

## 2018-08-21 PROCEDURE — 74011250636 HC RX REV CODE- 250/636: Performed by: OBSTETRICS & GYNECOLOGY

## 2018-08-21 PROCEDURE — 76210000040 HC AMBSU PH I REC FIRST 0.5 HR: Performed by: OBSTETRICS & GYNECOLOGY

## 2018-08-21 PROCEDURE — 77030011275 HC ELECTRD DEV NOVA HOLO -G1: Performed by: OBSTETRICS & GYNECOLOGY

## 2018-08-21 PROCEDURE — 76060000061 HC AMB SURG ANES 0.5 TO 1 HR: Performed by: OBSTETRICS & GYNECOLOGY

## 2018-08-21 RX ORDER — SODIUM CHLORIDE 0.9 % (FLUSH) 0.9 %
5-10 SYRINGE (ML) INJECTION EVERY 8 HOURS
Status: DISCONTINUED | OUTPATIENT
Start: 2018-08-21 | End: 2018-08-21 | Stop reason: HOSPADM

## 2018-08-21 RX ORDER — ONDANSETRON 2 MG/ML
INJECTION INTRAMUSCULAR; INTRAVENOUS AS NEEDED
Status: DISCONTINUED | OUTPATIENT
Start: 2018-08-21 | End: 2018-08-21 | Stop reason: HOSPADM

## 2018-08-21 RX ORDER — SODIUM CHLORIDE 0.9 % (FLUSH) 0.9 %
5-10 SYRINGE (ML) INJECTION AS NEEDED
Status: DISCONTINUED | OUTPATIENT
Start: 2018-08-21 | End: 2018-08-21 | Stop reason: HOSPADM

## 2018-08-21 RX ORDER — DEXAMETHASONE SODIUM PHOSPHATE 4 MG/ML
INJECTION, SOLUTION INTRA-ARTICULAR; INTRALESIONAL; INTRAMUSCULAR; INTRAVENOUS; SOFT TISSUE AS NEEDED
Status: DISCONTINUED | OUTPATIENT
Start: 2018-08-21 | End: 2018-08-21 | Stop reason: HOSPADM

## 2018-08-21 RX ORDER — LIDOCAINE HYDROCHLORIDE 20 MG/ML
INJECTION, SOLUTION EPIDURAL; INFILTRATION; INTRACAUDAL; PERINEURAL AS NEEDED
Status: DISCONTINUED | OUTPATIENT
Start: 2018-08-21 | End: 2018-08-21 | Stop reason: HOSPADM

## 2018-08-21 RX ORDER — BACITRACIN ZINC 500 UNIT/G
OINTMENT (GRAM) TOPICAL AS NEEDED
Status: DISCONTINUED | OUTPATIENT
Start: 2018-08-21 | End: 2018-08-21 | Stop reason: HOSPADM

## 2018-08-21 RX ORDER — LIDOCAINE HYDROCHLORIDE 10 MG/ML
0.1 INJECTION, SOLUTION EPIDURAL; INFILTRATION; INTRACAUDAL; PERINEURAL AS NEEDED
Status: DISCONTINUED | OUTPATIENT
Start: 2018-08-21 | End: 2018-08-21 | Stop reason: HOSPADM

## 2018-08-21 RX ORDER — IBUPROFEN 600 MG/1
600 TABLET ORAL
Qty: 30 TAB | Refills: 0 | Status: SHIPPED | OUTPATIENT
Start: 2018-08-21 | End: 2018-09-12

## 2018-08-21 RX ORDER — FENTANYL CITRATE 50 UG/ML
INJECTION, SOLUTION INTRAMUSCULAR; INTRAVENOUS AS NEEDED
Status: DISCONTINUED | OUTPATIENT
Start: 2018-08-21 | End: 2018-08-21 | Stop reason: HOSPADM

## 2018-08-21 RX ORDER — MIDAZOLAM HYDROCHLORIDE 1 MG/ML
INJECTION, SOLUTION INTRAMUSCULAR; INTRAVENOUS AS NEEDED
Status: DISCONTINUED | OUTPATIENT
Start: 2018-08-21 | End: 2018-08-21 | Stop reason: HOSPADM

## 2018-08-21 RX ORDER — PROPOFOL 10 MG/ML
INJECTION, EMULSION INTRAVENOUS
Status: DISCONTINUED | OUTPATIENT
Start: 2018-08-21 | End: 2018-08-21 | Stop reason: HOSPADM

## 2018-08-21 RX ORDER — LIDOCAINE HYDROCHLORIDE 10 MG/ML
20 INJECTION, SOLUTION EPIDURAL; INFILTRATION; INTRACAUDAL; PERINEURAL ONCE
Status: COMPLETED | OUTPATIENT
Start: 2018-08-21 | End: 2018-08-21

## 2018-08-21 RX ORDER — SODIUM CHLORIDE, SODIUM LACTATE, POTASSIUM CHLORIDE, CALCIUM CHLORIDE 600; 310; 30; 20 MG/100ML; MG/100ML; MG/100ML; MG/100ML
25 INJECTION, SOLUTION INTRAVENOUS CONTINUOUS
Status: DISCONTINUED | OUTPATIENT
Start: 2018-08-21 | End: 2018-08-21 | Stop reason: HOSPADM

## 2018-08-21 RX ORDER — KETOROLAC TROMETHAMINE 30 MG/ML
INJECTION, SOLUTION INTRAMUSCULAR; INTRAVENOUS AS NEEDED
Status: DISCONTINUED | OUTPATIENT
Start: 2018-08-21 | End: 2018-08-21 | Stop reason: HOSPADM

## 2018-08-21 RX ORDER — SILVER NITRATE 38.21; 12.74 MG/1; MG/1
STICK TOPICAL AS NEEDED
Status: DISCONTINUED | OUTPATIENT
Start: 2018-08-21 | End: 2018-08-21 | Stop reason: HOSPADM

## 2018-08-21 RX ORDER — SODIUM CHLORIDE, SODIUM LACTATE, POTASSIUM CHLORIDE, CALCIUM CHLORIDE 600; 310; 30; 20 MG/100ML; MG/100ML; MG/100ML; MG/100ML
INJECTION, SOLUTION INTRAVENOUS
Status: DISCONTINUED | OUTPATIENT
Start: 2018-08-21 | End: 2018-08-21 | Stop reason: HOSPADM

## 2018-08-21 RX ORDER — PROPOFOL 10 MG/ML
INJECTION, EMULSION INTRAVENOUS AS NEEDED
Status: DISCONTINUED | OUTPATIENT
Start: 2018-08-21 | End: 2018-08-21 | Stop reason: HOSPADM

## 2018-08-21 RX ADMIN — MIDAZOLAM HYDROCHLORIDE 2 MG: 1 INJECTION, SOLUTION INTRAMUSCULAR; INTRAVENOUS at 11:09

## 2018-08-21 RX ADMIN — SODIUM CHLORIDE, SODIUM LACTATE, POTASSIUM CHLORIDE, AND CALCIUM CHLORIDE 25 ML/HR: 600; 310; 30; 20 INJECTION, SOLUTION INTRAVENOUS at 09:42

## 2018-08-21 RX ADMIN — FENTANYL CITRATE 25 MCG: 50 INJECTION, SOLUTION INTRAMUSCULAR; INTRAVENOUS at 11:05

## 2018-08-21 RX ADMIN — PROPOFOL 30 MG: 10 INJECTION, EMULSION INTRAVENOUS at 11:13

## 2018-08-21 RX ADMIN — FENTANYL CITRATE 25 MCG: 50 INJECTION, SOLUTION INTRAMUSCULAR; INTRAVENOUS at 11:37

## 2018-08-21 RX ADMIN — SODIUM CHLORIDE, SODIUM LACTATE, POTASSIUM CHLORIDE, CALCIUM CHLORIDE: 600; 310; 30; 20 INJECTION, SOLUTION INTRAVENOUS at 10:27

## 2018-08-21 RX ADMIN — KETOROLAC TROMETHAMINE 30 MG: 30 INJECTION, SOLUTION INTRAMUSCULAR; INTRAVENOUS at 11:24

## 2018-08-21 RX ADMIN — LIDOCAINE HYDROCHLORIDE 40 MG: 20 INJECTION, SOLUTION EPIDURAL; INFILTRATION; INTRACAUDAL; PERINEURAL at 10:55

## 2018-08-21 RX ADMIN — PROPOFOL 50 MG: 10 INJECTION, EMULSION INTRAVENOUS at 11:10

## 2018-08-21 RX ADMIN — MIDAZOLAM HYDROCHLORIDE 2 MG: 1 INJECTION, SOLUTION INTRAMUSCULAR; INTRAVENOUS at 10:48

## 2018-08-21 RX ADMIN — PROPOFOL 50 MG: 10 INJECTION, EMULSION INTRAVENOUS at 10:57

## 2018-08-21 RX ADMIN — FENTANYL CITRATE 25 MCG: 50 INJECTION, SOLUTION INTRAMUSCULAR; INTRAVENOUS at 11:41

## 2018-08-21 RX ADMIN — PROPOFOL 100 MCG/KG/MIN: 10 INJECTION, EMULSION INTRAVENOUS at 11:00

## 2018-08-21 RX ADMIN — PROPOFOL 50 MG: 10 INJECTION, EMULSION INTRAVENOUS at 10:55

## 2018-08-21 RX ADMIN — FENTANYL CITRATE 25 MCG: 50 INJECTION, SOLUTION INTRAMUSCULAR; INTRAVENOUS at 11:16

## 2018-08-21 RX ADMIN — ONDANSETRON 4 MG: 2 INJECTION INTRAMUSCULAR; INTRAVENOUS at 11:09

## 2018-08-21 RX ADMIN — DEXAMETHASONE SODIUM PHOSPHATE 4 MG: 4 INJECTION, SOLUTION INTRA-ARTICULAR; INTRALESIONAL; INTRAMUSCULAR; INTRAVENOUS; SOFT TISSUE at 11:09

## 2018-08-21 NOTE — ANESTHESIA PREPROCEDURE EVALUATION
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 Cardiovascular  Within defined limits                Exercise tolerance: >4 METS     GI/Hepatic/Renal     GERD           Endo/Other  Within defined limits           Other Findings              Physical Exam    Airway  Mallampati: II  TM Distance: 4 - 6 cm    Mouth opening: Normal     Cardiovascular    Rhythm: regular  Rate: normal      Pertinent negatives: No murmur   Dental  No notable dental hx       Pulmonary  Breath sounds clear to auscultation               Abdominal  GI exam deferred       Other Findings            Anesthetic Plan    ASA: 3  Anesthesia type: MAC and general - backup          Induction: Intravenous  Anesthetic plan and risks discussed with: Patient      PONV prophylaxis

## 2018-08-21 NOTE — DISCHARGE INSTRUCTIONS
After Your Endometrial Ablation      1. You may resume your usual diet once the nausea resolves. Initially, try sips of warm fluids and a bland diet. 2. Avoid heavy lifting and straining. Gradually increase your activity. First, try walking and doing light activity around the house. Resume your normal habits if no significant discomfort or bleeding develops. Most women can return to work within one to four days after this procedure. 3. You may take showers. Avoid using a tub bath, swimming pool or hot tub until after your check-up. 4. Do not place anything in your vagina until after your postoperative visit. Do not   douche, use tampons, or have intercourse because this may cause bleeding and   infection. 5. You may initially experience a heavy bloody discharge. This should not be more than your menstrual flow. The discharge may continue for several days or a few weeks. 6. Typically following the procedure, there is crampy, menstrual-type pain. You may feel cramps in your lower abdomen. Tylenol or Ibuprofen may relieve mild cramping. If pain medication does not improve your symptoms, you should contact your physician. 7. Contact the office if you have excessive bleeding (saturating a pad an hour for two hours or passing large clots). It is also necessary to speak with your physician if you develop chills, a temperature greater than 100.4, difficulty voiding or burning on urination. 8. Your physician may want to see you in the office after your Endometrial Ablation. Please call for an appointment if this has not already been arranged. Our office phone number is [phone]. If appropriate, the microscopic results from your procedure will be discussed at this follow-up visit.        >>>You received Toradol during your surgery.  You may not take any form of NSAIDS (non steroidal anti inflammatory drugs) such as Advil, Ibuprofen, Aleve, Motrin until 5:30pm.    DO NOT TAKE TYLENOL/ACETAMINOPHEN WITH PERCOCET, [address], [address] [address] [facility]. TAKE NARCOTIC PAIN MEDICATIONS WITH FOOD     If given 2 pain narcotics do NOT take together! Narcotics tend to be constipating, we suggest taking a stool softener such as Colace or Miralax (follow package instructions). DO NOT DRIVE WHILE TAKING NARCOTIC PAIN MEDICATIONS. DO NOT TAKE SLEEPING MEDICATIONS OR ANTIANXIETY MEDICATIONS WHILE TAKING NARCOTIC PAIN MEDICATIONS,  ESPECIALLY THE NIGHT OF ANESTHESIA. CPAP PATIENTS BE SURE TO WEAR MACHINE WHENEVER NAPPING OR SLEEPING. DISCHARGE SUMMARY from Nurse    The following personal items collected during your admission are returned to you:   Dental Appliance: Dental Appliances: None  Vision: Visual Aid: Glasses, With patient  Hearing Aid:    Jewelry: Jewelry: None  Clothing: Clothing: Other (comment) (2 g ancef IV)  Other Valuables: Other Valuables:  (given to mother)  Valuables sent to safe:        PATIENT INSTRUCTIONS:    After General Anesthesia or Intravenous Sedation, for 24 hours or while taking prescription Narcotics:        Someone should be with you for the next 24 hours. For your own safety, a responsible adult must drive you home. · Limit your activities  · Recommended activity: Rest today, up with assistance today. Do not climb stairs or shower unattended for the next 24 hours. · Please start with a soft bland diet and advance as tolerated (no nausea) to regular diet. · If you have a sore throat you should try the following: fluids, warm salt water gargles, or throat lozenges. If it does not improve after several days please follow up with your primary physician. · Do not drive and operate hazardous machinery  · Do not make important personal or business decisions  · Do  not drink alcoholic beverages  · If you have not urinated within 8 hours after discharge, please contact your surgeon on call.     Report the following to your surgeon:  · Excessive pain, swelling, redness or odor of or around the surgical area  · Temperature over 100.5  · Nausea and vomiting lasting longer than 4 hours or if unable to take medications  · Any signs of decreased circulation or nerve impairment to extremity: change in color, persistent  numbness, tingling, coldness or increase pain      · You will receive a Post Operative Call from one of the Recovery Room Nurses on the day after your surgery to check on you. It is very important for us to know how you are recovering after your surgery. If you have an issue or need to speak with someone, please call your surgeon, do not wait for the post operative call. · You may receive an e-mail or letter in the mail from [employer] regarding your experience with us in the Ambulatory Surgery Unit. Your feedback is valuable to us and we appreciate your participation in the survey. · If the above instructions are not adequate, please contact Siria Casas RN, Brenda anesthesia Nurse Manager or our Anesthesiologist, at [phone]. If you are having problems after your surgery, call the physician at their office number. · We wish you a speedy recovery ? What to do at Home:      *  Please give a list of your current medications to your Primary Care Provider. *  Please update this list whenever your medications are discontinued, doses are      changed, or new medications (including over-the-counter products) are added. *  Please carry medication information at all times in case of emergency situations. These are general instructions for a healthy lifestyle:    No smoking/ No tobacco products/ Avoid exposure to second hand smoke    Surgeon General's Warning:  Quitting smoking now greatly reduces serious risk to your health.     Obesity, smoking, and sedentary lifestyle greatly increases your risk for illness    A healthy diet, regular physical exercise & weight monitoring are important for maintaining a healthy lifestyle    You may be retaining fluid if you have a history of heart failure or if you experience any of the following symptoms:  Weight gain of 3 pounds or more overnight or 5 pounds in a week, increased swelling in our hands or feet or shortness of breath while lying flat in bed. Please call your doctor as soon as you notice any of these symptoms; do not wait until your next office visit. Recognize signs and symptoms of STROKE:    B - Balance  E - Eyes    F-  Face looks uneven    A-  Arms unable to move or move even    S-  Speech slurred or non-existent    T-  Time-call 911 as soon as signs and symptoms begin-DO NOT go       Back to bed or wait to see if you get better-TIME IS BRAIN. If you have not received your influenza and/or pneumococcal vaccine, please follow up with your primary care physician. The discharge information has been reviewed with the patient, parent and caregiver. The patient and caregiver verbalized understanding.

## 2018-08-21 NOTE — OP NOTES
HYSTEROSCOPY, D&C, NOVASURE ENDOMETRIAL ABLATION FULL OP NOTE      Willard Ambrose    DATE OF PROCEDURE:  8/21/2018    PREOPERATIVE DIAGNOSIS:  MENORRHAGIA    POSTOPERATIVE DIAGNOSIS:  MENORRHAGIA    PROCEDURE: Hysteroscopy, Dilatation & Curettage, NovaSure endometrial ablation. SURGEON:  Enriqueta Auguste MD    ASSISTANT:  none    ANESTHESIA: MAC. Paracervical block. EBL:  minimal    FINDINGS: normal size midplane uterus with normal intrauterine cavity    Specimen:  Endometrial curettings     PROCEDURE:  Time out was done to confirm the operating procedure, surgeon, patient and site. Once confirmed by the team, procedure was started. MAC anesthesia was found to be adequate. She was prepped and draped in normal sterile fashion in dorsal lithotomy position. I&O catheterization was performed. Cervix was visualized with the aid of a sidearm speculum and grasped with a single-tooth tenaculum. A paracervical block was obtained by injecting 5 cc of 1% Lidocaine at 4 and 8 o'clock. The cervix was then dilated to 23-Kyrgyz. The diagnostic hysteroscope was introduced using normal saline as distention media. The uterine cavity appeared normal with no polyps or fibroids seen. The hysteroscope was removed. Endometrial curettage was performed until a gritty texture was noted on all aspects of the uterine cavity. The NovaSure ablation device was then opened. The uterus sounded to 9 cm with cervical length of 4 cm. The settings on the NovaSure ablation were set for a length of 5 cm and a width of 4.3 cm with treatment for 57 seconds at a power level of 147. The Novasure instrument was placed and seating maneuvers performed. The integrity tested revealed no defect in uterine cavity and the ablation was performed. The NovaSure ablater was then removed. There was a small amount of bleeding from tenaculum site after the tenaculum was removed and this was made hemostatic with silver nitrate. The speculum was removed.  There was a small area of bleeding at the itroitus from speculum placement and this was made hemostatic with a figure of 8 stitch of 4-0 vicryl. Bacitracin was placed over the stitch. Sponge, lap and needle counts were correct x 2. The patient went to the recovery room in stable condition.

## 2018-08-21 NOTE — ANESTHESIA POSTPROCEDURE EVALUATION
Post-Anesthesia Evaluation and Assessment    Patient: Arleen Hernandez MRN: [id number]  SSN: [id number]    YOB: 1985  Age: 26 y.o. Sex: female       Cardiovascular Function/Vital Signs  Visit Vitals    /87    Pulse 84    Temp 36.7 °C (98 °F)    Resp 17    Ht 5' 2\" (1.575 m)    Wt 75.3 kg (166 lb)    SpO2 98%    BMI 30.36 kg/m2       Patient is status post MAC, general - backup anesthesia for Procedure(s): HYSTEROSCOPY DILITATION AND CURETTAGE  NOVASURE ENDOMETRIAL ABLATION. Nausea/Vomiting: None    Postoperative hydration reviewed and adequate. Pain:  Pain Scale 1: FLACC (08/21/18 1154)  Pain Intensity 1: 3 (08/21/18 1154)   Managed    Neurological Status:   Neuro (WDL): Within Defined Limits (08/21/18 1149)   At baseline    Mental Status and Level of Consciousness: Arousable    Pulmonary Status:   O2 Device: Room air (08/21/18 1149)   Adequate oxygenation and airway patent    Complications related to anesthesia: None    Post-anesthesia assessment completed.  No concerns    Signed By: Leah Wilder MD     August 21, 2018

## 2018-08-21 NOTE — PERIOP NOTES
18 Pt arrived to PACU awake and talking. ABd soft. No drainage on first peripad. Abd soft. Pt asking for food and drink. Pt says she has a little pain. Fentanyl given by CRNA/  1145 mother brought to bedside. Ginger ale given. Pt anxious to go home. Pt says she has little pain. 1200 Discharge instructions reviewed with mother. Questions answererd. Crackers provided to pt.  1210 Pt dressed. Voided in bathroom. Light bloody drainage when wiping. Pt meets discharge criteria.

## 2018-08-21 NOTE — PERIOP NOTES
Raghukai Tom  1985  [id number]    Situation:  Verbal report given from: MAL Hernandez 89  Procedure: Procedure(s):   HYSTEROSCOPY DILITATION AND CURETTAGE  NOVASURE ENDOMETRIAL ABLATION    Background:    Preoperative diagnosis: MENORRHAGIA    Postoperative diagnosis: MENORRHAGIA    :  Dr. Choe Counter    Assistant(s): Circ-1: Torrie Veloz RN  Scrub Tech-1: Michelle Garcia    Specimens:   ID Type Source Tests Collected by Time Destination   1 : endometrial curettings Preservative Endometrial Curetting  Jones Bowman MD 8/21/2018 1122 Pathology       Assessment:  Intra-procedure medications         Anesthesia gave intra-procedure sedation and medications, see anesthesia flow sheet     Intravenous fluids: LR@ KVO     Vital signs stable       Recommendation:    Permission to share finding with mother : yes

## 2018-08-21 NOTE — PERIOP NOTES
150 ml clear yellow urine drained with 16 fr red rubber catheter by Dr. Daija Leahy prior to start of procedure.

## 2018-08-21 NOTE — IP AVS SNAPSHOT
[address] [facility] 
[phone] Patient: Matt Hartmann MRN: [id number] :1985 About your hospitalization You were admitted on:  2018 You last received care in the:  [facility] [place] [facility] You were discharged on:  2018 Why you were hospitalized Your primary diagnosis was:  Not on File Follow-up Information Follow up With Details Comments Contact Info Ericka Duran MD   [facility] [id number] 
[phone] Your Scheduled Appointments 2018  9:40 AM EDT Follow Up with Polo Parker MD  
Neurology Clinic at [address] [place] [address], 
[address] [place] [address], [address] [facility]  
[phone] Discharge Orders None A check anais indicates which time of day the medication should be taken. My Medications START taking these medications Instructions Each Dose to Equal  
 Morning Noon Evening Bedtime  
 ibuprofen 600 mg tablet Commonly known as:  MOTRIN Your last dose was: Your next dose is: Take 1 Tab by mouth every six (6) hours as needed for Pain for up to 360 days. 600 mg CONTINUE taking these medications Instructions Each Dose to Equal  
 Morning Noon Evening Bedtime  
 fluticasone 50 mcg/actuation nasal spray Commonly known as:  Saranya Lind Your last dose was: Your next dose is: 2 Sprays by Both Nostrils route daily. 2 Spray  
    
   
   
   
  
 fluvoxaMINE 25 mg tablet Commonly known as:  Dana [place] Your last dose was: Your next dose is:    
   
   
 25 mg p.o. nightly for 1 week and then discontinue OXcarbaxepine 600 mg tablet Commonly known as:  TRILEPTAL Your last dose was: Your next dose is: Take 1 Tab by mouth two (2) times a day. 600 mg Where to Get Your Medications Information on where to get these meds will be given to you by the nurse or doctor. ! Ask your nurse or doctor about these medications  
  ibuprofen 600 mg tablet Discharge Instructions After Your Endometrial Ablation 1. You may resume your usual diet once the nausea resolves. Initially, try sips of warm fluids and a bland diet. 2. Avoid heavy lifting and straining. Gradually increase your activity. First, try walking and doing light activity around the house. Resume your normal habits if no significant discomfort or bleeding develops. Most women can return to work within one to four days after this procedure. 3. You may take showers. Avoid using a tub bath, swimming pool or hot tub until after your check-up. 4. Do not place anything in your vagina until after your postoperative visit. Do not  
douche, use tampons, or have intercourse because this may cause bleeding and  
infection. 5. You may initially experience a heavy bloody discharge. This should not be more than your menstrual flow. The discharge may continue for several days or a few weeks. 6. Typically following the procedure, there is crampy, menstrual-type pain. You may feel cramps in your lower abdomen. Tylenol or Ibuprofen may relieve mild cramping. If pain medication does not improve your symptoms, you should contact your physician. 7. Contact the office if you have excessive bleeding (saturating a pad an hour for two hours or passing large clots). It is also necessary to speak with your physician if you develop chills, a temperature greater than 100.4, difficulty voiding or burning on urination. 8. Your physician may want to see you in the office after your Endometrial Ablation.  Please call for an appointment if this has not already been arranged. Our office phone number is [phone]. If appropriate, the microscopic results from your procedure will be discussed at this follow-up visit.     
 
>>>You received Toradol during your surgery. You may not take any form of NSAIDS (non steroidal anti inflammatory drugs) such as Advil, Ibuprofen, Aleve, Motrin until 5:30pm. 
 
DO NOT TAKE TYLENOL/ACETAMINOPHEN WITH PERCOCET, LORTAB, NORCO OR VICODEN. TAKE NARCOTIC PAIN MEDICATIONS WITH FOOD If given 2 pain narcotics do NOT take together! Narcotics tend to be constipating, we suggest taking a stool softener such as Colace or Miralax (follow package instructions). DO NOT DRIVE WHILE TAKING NARCOTIC PAIN MEDICATIONS. DO NOT TAKE SLEEPING MEDICATIONS OR ANTIANXIETY MEDICATIONS WHILE TAKING NARCOTIC PAIN MEDICATIONS,  ESPECIALLY THE NIGHT OF ANESTHESIA. CPAP PATIENTS BE SURE TO WEAR MACHINE WHENEVER NAPPING OR SLEEPING. DISCHARGE SUMMARY from Nurse The following personal items collected during your admission are returned to you:  
Dental Appliance: Dental Appliances: None Vision: Visual Aid: Glasses, With patient Hearing Aid:   
Jewelry: Jewelry: None Clothing: Clothing: Other (comment) (2 g ancef IV) Other Valuables: Other Valuables:  (given to mother) Valuables sent to safe:   
 
 
PATIENT INSTRUCTIONS: 
 
 
B - Balance E - Eyes F-  Face looks uneven A-  Arms unable to move or move even S-  Speech slurred or non-existent T-  Time-call 911 as soon as signs and symptoms begin-DO NOT go Back to bed or wait to see if you get better-TIME IS BRAIN. If you have not received your influenza and/or pneumococcal vaccine, please follow up with your primary care physician. The discharge information has been reviewed with the patient, parent and caregiver. The patient and caregiver verbalized understanding. Introducing [facility]! [facility] introduces [employer] patient portal. Now you can access parts of your medical record, email your doctor's office, and request medication refills online. 1. In your internet browser, go to [email or web address] 2. Click on the First Time User? Click Here link in the Sign In box. You will see the New Member Sign Up page. 3. Enter your [employer] Access Code exactly as it appears below. You will not need to use this code after youve completed the sign-up process.  If you do not sign up before the expiration date, you must request a new code. · [employer] Access Code: [email or web address] Expires: 11/19/2018  8:58 AM 
 
4. Enter the last four digits of your Social Security Number (xxxx) and Date of Birth (mm/dd/yyyy) as indicated and click Submit. You will be taken to the next sign-up page. 5. Create a Team Everest ID. This will be your [employer] login ID and cannot be changed, so think of one that is secure and easy to remember. 6. Create a [employer] password. You can change your password at any time. 7. Enter your Password Reset Question and Answer. This can be used at a later time if you forget your password. 8. Enter your e-mail address. You will receive e-mail notification when new information is available in [address] 9. Click Sign Up. You can now view and download portions of your medical record. 10. Click the Download Summary menu link to download a portable copy of your medical information. If you have questions, please visit the Frequently Asked Questions section of the [employer] website. Remember, [employer] is NOT to be used for urgent needs. For medical emergencies, dial 911. Now available from your iPhone and Android! Introducing Chava Del Real As a [employer] patient, I wanted to make you aware of our electronic visit tool called Chava Del Real. [employer] 24/7 allows you to connect within minutes with a medical provider 24 hours a day, seven days a week via a mobile device or tablet or logging into a secure website from your computer. You can access Chava Del Real from anywhere in the [place].  
 
A virtual visit might be right for you when you have a simple condition and feel like you just dont want to get out of bed, or cant get away from work for an appointment, when your regular [employer] provider is not available (evenings, weekends or holidays), or when youre out of town and need minor care. Electronic visits cost only $49 and if the [employer] 24/7 provider determines a prescription is needed to treat your condition, one can be electronically transmitted to a nearby pharmacy*. Please take a moment to enroll today if you have not already done so. The enrollment process is free and takes just a few minutes. To enroll, please download the [employer] mae to your tablet or phone, or visit [email or web address] to enroll on your computer. And, as an [address] [place] [address] patient with a [employer] account, the results of your visits will be scanned into your electronic medical record and your primary care provider will be able to view the scanned results. We urge you to continue to see your regular [facility] provider for your ongoing medical care. And while your primary care provider may not be the one available when you seek a [employer] virtual visit, the peace of mind you get from getting a real diagnosis real time can be priceless. For more information on [employer], view our Frequently Asked Questions (FAQs) at [email or web address]. Sincerely, 
 
Karsten Truong MD 
Chief Medical Officer [address] *:  certain medications cannot be prescribed via [employer] Providers Seen During Your Hospitalization Provider Specialty Primary office phone Taylor Cook MD Obstetrics & Gynecology [phone] Your Primary Care Physician (PCP) Primary Care Physician Office Phone Office Fax Ovi Ibrahim [phone] You are allergic to the following Allergen Reactions Bees (Sting, Bee) Anaphylaxis Pertussis Vaccine,Adsorbed Other (comments) Recent Documentation Height Weight BMI OB Status Smoking Status 1.575 m 75.3 kg 30.36 kg/m2 Having regular periods Never Smoker Emergency Contacts Name Discharge Info Relation Home Work [address]  Parent [1] [phone] Patient Belongings The following personal items are in your possession at time of discharge: 
  Dental Appliances: None  Visual Aid: Glasses, With patient      Home Medications: None   Jewelry: None  Clothing: Other (comment) (2 g ancef IV)    Other Valuables:  (given to mother) Discharge Instructions Attachments/References IBUPROFEN (BY MOUTH) (ENGLISH) Patient Handouts Ibuprofen (By mouth) Ibuprofen (eye-bue-PROE-fen) Treats pain and fever. This medicine is an NSAID. Brand Name(s): Advil, Advil Children's, Advil Liqui-Gels, Advil Migraine, All-Purpose First Aid Kit, Children's Ibuprofen, Children's Motrin, Comfort Pac, Concentrated Motrin Infants' Drops, Equate Ibuprofen Edis Strength, Genpril, Good [facility]ighbor Ibuprofen Infants', [facility] Children's Ibuprofen, [facility] Ibuprofen, [facility] Ibuprofen Edis Strength There may be other brand names for this medicine. When This Medicine Should Not Be Used: This medicine is not right for everyone. Do not use if you had an allergic reaction (including asthma) to ibuprofen, aspirin, or another NSAID, or right before or after heart surgery. How to Use This Medicine:  
Capsule, Liquid Filled Capsule, Suspension, Tablet, Chewable Tablet · Your doctor will tell you how much medicine to use. Do not use more than directed. · Prescription ibuprofen should come with a Medication Guide. Ask your pharmacist for the Medication Guide if you do not have one. · Follow the instructions on the medicine label if you are using this medicine without a prescription. · Take this medicine with food or milk if it upsets your stomach. · Oral liquid: Shake well just before using. Measure with a marked measuring spoon, oral syringe, or medicine cup. · Chewable tablet: Chew completely before you swallow it. Then drink some water to make sure you swallow all of the medicine. · For Children: Ask your pharmacist if you are not sure how much medicine to give a child. The dose is usually based on weight, not age. Never give more medicine than directed. · For Adults: Do not take more than 6 pills in 1 day (24 hours) unless your doctor tells you to. · Missed dose: If you take this medicine on a regular basis and miss a dose, take it as soon as you can. If it is almost time for your next dose, wait until then to use the medicine and skip the missed dose. Do not use extra medicine to make up for a missed dose. · Store the medicine in a closed container at room temperature, away from heat, moisture, and direct light. Do not freeze the oral liquid. Drugs and Foods to Avoid: Ask your doctor or pharmacist before using any other medicine, including over-the-counter medicines, vitamins, and herbal products. · Some foods and medicine can affect how ibuprofen works. Tell your doctor if you are also using lithium, methotrexate, a blood thinner (such as warfarin), a steroid medicine (such as hydrocortisone, prednisolone, prednisone), a diuretic (water pill), or an ACE inhibitor blood pressure medicine. · Do not use any other NSAID medicine unless your doctor says it is okay. Some other NSAIDs are aspirin, diclofenac, naproxen, or celecoxib. · Do not drink alcohol while you are using this medicine. Warnings While Using This Medicine: · Tell your doctor if you are pregnant or breastfeeding. Do not use this medicine during the later part of pregnancy. · Tell your doctor if you have kidney disease, liver disease, asthma, lupus or a similar connective tissue disease, or a history of ulcers or other digestion problems. Tell your doctor if you smoke or have heart or blood circulation problems, including high blood pressure, heart failure (CHF), or bleeding problems. · This medicine may cause the following problems: ¨ Bleeding and ulcers in the stomach or intestines ¨ Higher risk of heart attack or stroke ¨ Liver damage ¨ Kidney damage ¨ Vision problems · Call your doctor if symptoms get worse, pain lasts more than 10 days, or fever lasts more than 3 days. · This medicine might contain sugar or phenylalanine (aspartame). · Tell any doctor or dentist who treats you that you are using this medicine. · Keep all medicine out of the reach of children. Never share your medicine with anyone. Possible Side Effects While Using This Medicine:  
Call your doctor right away if you notice any of these side effects: · Allergic reaction: Itching or hives, swelling in your face or hands, swelling or tingling in your mouth or throat, chest tightness, trouble breathing · Blistering, peeling, or red skin rash · Change in how much or how often you urinate · Chest pain that may spread to your arms, jaw, back, or neck, trouble breathing, nausea, unusual sweating, faintness · Chest pain, trouble breathing, weakness on one side of your body, severe headache, trouble seeing or talking, pain in your lower leg · Dark urine or pale stools, nausea, vomiting, loss of appetite, stomach pain, yellow skin or eyes · Fever, neck pain, stiff neck · Severe stomach pain, vomiting blood, bloody or black, tarry stools · Swelling in your hands, ankles, or feet, rapid weight gain · Trouble seeing, blind spots, change in how you see colors · Unusual bleeding, bruising, or weakness If you notice these less serious side effects, talk with your doctor: · Constipation, diarrhea, gas, mild upset stomach · Dizziness, headache, ringing in the ears If you notice other side effects that you think are caused by this medicine, tell your doctor. Call your doctor for medical advice about side effects. You may report side effects to FDA at 5-247-QDA-5143 © 2017 [facility] Information is for End User's use only and may not be sold, redistributed or otherwise used for commercial purposes. The above information is an  only. It is not intended as medical advice for individual conditions or treatments. Talk to your doctor, nurse or pharmacist before following any medical regimen to see if it is safe and effective for you. Please provide this summary of care documentation to your next provider. Signatures-by signing, you are acknowledging that this After Visit Summary has been reviewed with you and you have received a copy. Patient Signature:  ____________________________________________________________ Date:  ____________________________________________________________  
  
Christina Artist Provider Signature:  ____________________________________________________________ Date:  ____________________________________________________________

## 2018-08-23 ENCOUNTER — DOCUMENTATION ONLY (OUTPATIENT)
Dept: NEUROLOGY | Age: 33
End: 2018-08-23

## 2018-08-23 DIAGNOSIS — G40.909 NONINTRACTABLE EPILEPSY WITHOUT STATUS EPILEPTICUS, UNSPECIFIED EPILEPSY TYPE (HCC): ICD-10-CM

## 2018-08-23 DIAGNOSIS — G40.909 SEIZURE DISORDER (HCC): ICD-10-CM

## 2018-08-23 DIAGNOSIS — G93.49 STATIC ENCEPHALOPATHY: ICD-10-CM

## 2018-08-23 DIAGNOSIS — G93.49 CHRONIC STATIC ENCEPHALOPATHY: ICD-10-CM

## 2018-08-23 RX ORDER — OXCARBAZEPINE 600 MG/1
600 TABLET, FILM COATED ORAL 2 TIMES DAILY
Qty: 180 TAB | Refills: 1 | Status: SHIPPED | OUTPATIENT
Start: 2018-08-23 | End: 2018-09-12 | Stop reason: DRUGHIGH

## 2018-09-12 ENCOUNTER — OFFICE VISIT (OUTPATIENT)
Dept: NEUROLOGY | Age: 33
End: 2018-09-12

## 2018-09-12 VITALS
HEART RATE: 81 BPM | SYSTOLIC BLOOD PRESSURE: 112 MMHG | HEIGHT: 62 IN | DIASTOLIC BLOOD PRESSURE: 75 MMHG | OXYGEN SATURATION: 97 % | BODY MASS INDEX: 30.55 KG/M2 | WEIGHT: 166 LBS

## 2018-09-12 DIAGNOSIS — G40.909 NONINTRACTABLE EPILEPSY WITHOUT STATUS EPILEPTICUS, UNSPECIFIED EPILEPSY TYPE (HCC): Primary | ICD-10-CM

## 2018-09-12 DIAGNOSIS — G93.49 CHRONIC STATIC ENCEPHALOPATHY: ICD-10-CM

## 2018-09-12 DIAGNOSIS — F42.9 OBSESSIVE-COMPULSIVE DISORDER, UNSPECIFIED TYPE: ICD-10-CM

## 2018-09-12 RX ORDER — OXCARBAZEPINE 300 MG/1
TABLET, FILM COATED ORAL
Qty: 74 TAB | Refills: 5 | Status: SHIPPED | OUTPATIENT
Start: 2018-09-12 | End: 2019-03-12 | Stop reason: SDUPTHER

## 2018-09-12 NOTE — PATIENT INSTRUCTIONS
1.  Trileptal 300 mg in the morning and 600 mg at night for 2 weeks and then 300 mg p.o. twice daily 2. Follow-up in 6 months Office Policies · Phone calls/patient messages: Please allow up to 24 hours for someone in the office to contact you about your call or message. Be mindful your provider may be out of the office or your message may require further review. We encourage you to use [employer] for your messages as this is a faster, more efficient way to communicate with our office · Medication Refills: 
Prescription medications require up to 48 business hours to process. We encourage you to use [employer] for your refills. For controlled medications: Please allow up to 72 business hours to process. Certain medications may require you to  a written prescription at our office. NO narcotic/controlled medications will be prescribed after 4pm Monday through Friday or on weekends · Form/Paperwork Completion: 
Please note there is a $25 fee for all paperwork completed by our providers. We ask that you allow 7-14 business days. Pre-payment is due prior to picking up/faxing the completed form. You may also download your forms to [employer] to have your doctor print off.

## 2018-09-12 NOTE — LETTER
9/12/2018 10:16 AM 
 
Patient:    Lissy Cuellar YOB: 1985 Date of Visit:    9/12/2018 Dear Toney Hudson MD 
 
Thank you for referring Ms. Lissy Cuellar to me for evaluation/treatment. Below are the relevant portions of my assessment and plan of care. NEUROLOGY FOLLOW UP NOTE 
 
09/12/18 Chief Complaint Patient presents with  Follow-up  
  seizures Subjective Lissy Cuellar is a 26 y.o. female who presented to the neurology office for management of seizures. 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 She is doing well on trileptal 600 mg po bid. She has not had a seizure atleast since 2013. Current Outpatient Prescriptions Medication Sig  
 OXcarbazepine (TRILEPTAL) 300 mg tablet 300 mg in the morning and 600 mg at night for 2 weeks and then 300 mg p.o. twice daily No current facility-administered medications for this visit. Allergies Allergen Reactions  Bees [Sting, Bee] Anaphylaxis  Pertussis Vaccine,Adsorbed Other (comments) REVIEW OF SYSTEMS:  
A ten system review of constitutional, cardiovascular, respiratory, musculoskeletal, endocrine, skin, SHEENT, genitourinary, psychiatric and neurologic systems was obtained and is unremarkable except as stated in HPI EXAMINATION:  
Visit Vitals  /75  Pulse 81  
 Ht 5' 2\" (1.575 m)  Wt 166 lb (75.3 kg)  LMP 08/16/2018  SpO2 97%  BMI 30.36 kg/m2 General:  
General appearance: Pt is in no acute distress Distal pulses are preserved Neurological Examination:  
Mental Status: AA. Speech is fluent. Follows commands, has normal fund of knowledge, attention, short term recall, comprehension and insight. Cranial Nerves: Visual fields are full. PERRL, Extraocular movements are full. Facial sensation intact V1- V3. Facial movement intact, symmetric. Hearing intact to conversation. Palate elevates symmetrically.  Shoulder shrug symmetric. Tongue midline. Motor: Strength is 5/5 in all 4 ext. Sensation: Normal to light touch Coordination/Cerebellar: Intact to finger-nose-finger Laboratory review:  
Results for orders placed or performed during the hospital encounter of 08/21/18 HCG URINE, QL. - POC Result Value Ref Range Pregnancy test,urine (POC) NEGATIVE  NEG    
POC HEMOGLOBIN Result Value Ref Range Hemoglobin (POC) 13.2 11.5 - 16.0 g/dL Imaging review:  
 
2/19/2013: EEG had right temporal sharp waves that were improved from spike and wave complexes. Documentation review: 
None Assessment/Plan:  
Darlyn Perla is a 26 y.o. 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 She resumed having seizures and is on trileptal 600 mg po bid. She is doing well with no seizures atleast since 2013. Mother wants to decrease the dosage of red. Since the patient has been seizure-free for the last 5 years well a chance then decrease Trileptal to 300 mg in the morning and 600 mg at night for 2 weeks and then 300 mg p.o. twice daily. Follow-up in 6 months ICD-10-CM ICD-9-CM 1. Nonintractable epilepsy without status epilepticus, unspecified epilepsy type ([facility] [address] G40.909 345.90 OXcarbazepine (TRILEPTAL) 300 mg tablet 2. Chronic static encephalopathy G93.49 348.39   
3. Obsessive-compulsive disorder, unspecified type F42.9 300.3 This note will not be viewable in [address] If you have questions, please do not hesitate to call me. I look forward to following Ms. Roblero along with you. Sincerely, Shaista Hardy MD

## 2018-09-12 NOTE — PROGRESS NOTES
NEUROLOGY FOLLOW UP NOTE 
 
09/12/18 Chief Complaint Patient presents with  Follow-up  
  seizures Subjective David Beltre is a 26 y.o. female who presented to the neurology office for management of seizures. She does have [place] syndrome and was seizure free from [age] yrs of age. She is doing well on trileptal 600 mg po bid. She has not had a seizure atleast since 2013. Current Outpatient Prescriptions Medication Sig  
 OXcarbazepine (TRILEPTAL) 300 mg tablet 300 mg in the morning and 600 mg at night for 2 weeks and then 300 mg p.o. twice daily No current facility-administered medications for this visit. Allergies Allergen Reactions  Bees [Sting, Bee] Anaphylaxis  Pertussis Vaccine,Adsorbed Other (comments) REVIEW OF SYSTEMS:  
A ten system review of constitutional, cardiovascular, respiratory, musculoskeletal, endocrine, skin, SHEENT, genitourinary, psychiatric and neurologic systems was obtained and is unremarkable except as stated in HPI EXAMINATION:  
Visit Vitals  /75  Pulse 81  
 Ht 5' 2\" (1.575 m)  Wt 166 lb (75.3 kg)  LMP 08/16/2018  SpO2 97%  BMI 30.36 kg/m2 General:  
General appearance: Pt is in no acute distress Distal pulses are preserved Neurological Examination:  
Mental Status: AA. Speech is fluent. Follows commands, has normal fund of knowledge, attention, short term recall, comprehension and insight. Cranial Nerves: Visual fields are full. PERRL, Extraocular movements are full. Facial sensation intact V1- V3. Facial movement intact, symmetric. Hearing intact to conversation. Palate elevates symmetrically. Shoulder shrug symmetric. Tongue midline. Motor: Strength is 5/5 in all 4 ext. Sensation: Normal to light touch Coordination/Cerebellar: Intact to finger-nose-finger Laboratory review:  
Results for orders placed or performed during the hospital encounter of 08/21/18 HCG URINE, QL. - POC  
 Result Value Ref Range Pregnancy test,urine (POC) NEGATIVE  NEG    
POC HEMOGLOBIN Result Value Ref Range Hemoglobin (POC) 13.2 11.5 - 16.0 g/dL Imaging review:  
 
2/19/2013: EEG had right temporal sharp waves that were improved from spike and wave complexes. Documentation review: 
None Assessment/Plan:  
Yoli Guillermo is a 26 y.o. 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 She resumed having seizures and is on trileptal 600 mg po bid. She is doing well with no seizures atleast since 2013. Mother wants to decrease the dosage of red. Since the patient has been seizure-free for the last 5 years well a chance then decrease Trileptal to 300 mg in the morning and 600 mg at night for 2 weeks and then 300 mg p.o. twice daily. Follow-up in 6 months ICD-10-CM ICD-9-CM 1. Nonintractable epilepsy without status epilepticus, unspecified [facility] G40.909 345.90 OXcarbazepine (TRILEPTAL) 300 mg tablet 2. Chronic static encephalopathy G93.49 348.39   
3. Obsessive-compulsive disorder, unspecified type F42.9 300.3 This note will not be viewable in [address]

## 2018-09-12 NOTE — MR AVS SNAPSHOT
[address], 
[id number], [address] [facility] 
[phone] Patient: Aurea Abreu MRN: [id number] :1985 Visit Information Date & Time Provider Department Dept. Phone Encounter #  
 2018  9:40 AM Belen Farnsworth MD [facility]rology [facility] at [facility] [phone] [id number] Upcoming Health Maintenance Date Due DTaP/Tdap/Td series (1 - Tdap) 3/8/2004 PAP AKA CERVICAL CYTOLOGY 3/8/2004 MEDICARE YEARLY EXAM 3/14/2018 Influenza Age 5 to Adult 2018 Allergies as of 2018  Review Complete On: 2018 By: Elena Garrett RN Severity Noted Reaction Type Reactions Bees [Sting, Bee]  10/08/2011   Systemic Anaphylaxis Pertussis Vaccine,adsorbed  2014    Other (comments) Current Immunizations  Never Reviewed No immunizations on file. Not reviewed this visit You Were Diagnosed With   
  
 Codes Comments Nonintractable epilepsy without status epilepticus, unspecified epilepsy type ([facility]    -  Primary ICD-10-CM: K84.532 ICD-9-CM: 345.90 Chronic static encephalopathy     ICD-10-CM: G93.49 
ICD-9-CM: 348.39 Obsessive-compulsive disorder, unspecified type     ICD-10-CM: F42.9 ICD-9-CM: 300.3 Vitals BP Pulse Height(growth percentile) Weight(growth percentile) LMP SpO2  
 112/75 81 5' 2\" (1.575 m) 166 lb (75.3 kg) 2018 97% BMI OB Status Smoking Status 30.36 kg/m2 Having regular periods Never Smoker Vitals History BMI and BSA Data Body Mass Index Body Surface Area  
 30.36 kg/m 2 1.81 m 2 Preferred Pharmacy Pharmacy Name Phone [facility] [phone] Your Updated Medication List  
  
   
This list is accurate as of 18  9:51 AM.  Always use your most recent med list.  
  
  
  
  
 fluticasone 50 mcg/actuation nasal spray Commonly known as:  Lella Solum 2 Sprays by Both Nostrils route daily. fluvoxaMINE 25 mg tablet Commonly known as:  Krystina Madden 25 mg p.o. nightly for 1 week and then discontinue  
  
 ibuprofen 600 mg tablet Commonly known as:  MOTRIN Take 1 Tab by mouth every six (6) hours as needed for Pain for up to 360 days. OXcarbazepine 300 mg tablet Commonly known as:  TRILEPTAL  
300 mg in the morning and 600 mg at night for 2 weeks and then 300 mg p.o. twice daily Prescriptions Sent to Pharmacy Refills OXcarbazepine (TRILEPTAL) 300 mg tablet 5 Si mg in the morning and 600 mg at night for 2 weeks and then 300 mg p.o. twice daily Class: Normal  
 Pharmacy: [address] [place] [address] #: [phone] Patient Instructions 1. Trileptal 300 mg in the morning and 600 mg at night for 2 weeks and then 300 mg p.o. twice daily 2. Follow-up in 6 months Office Policies · Phone calls/patient messages: Please allow up to 24 hours for someone in the office to contact you about your call or message. Be mindful your provider may be out of the office or your message may require further review. We encourage you to use [email or web address] for your messages as this is a faster, more efficient way to communicate with our office · Medication Refills: 
Prescription medications require up to 48 business hours to process. We encourage you to use [email or web address] for your refills. For controlled medications: Please allow up to 72 business hours to process. Certain medications may require you to  a written prescription at our office. NO narcotic/controlled medications will be prescribed after 4pm Monday through Friday or on weekends · Form/Paperwork Completion: 
Please note there is a $25 fee for all paperwork completed by our providers. We ask that you allow 7-14 business days.  Pre-payment is due prior to picking up/faxing the completed form. You may also download your forms to [employer] to have your doctor print off. Introducing [facility]! [facility] introduces [employer] patient portal. Now you can access parts of your medical record, email your doctor's office, and request medication refills online. 1. In your internet browser, go to [email or web address] 2. Click on the First Time User? Click Here link in the Sign In box. You will see the New Member Sign Up page. 3. Enter your [employer] Access Code exactly as it appears below. You will not need to use this code after youve completed the sign-up process. If you do not sign up before the expiration date, you must request a new code. · [employer] Access Code: [email or web address] Expires: 11/19/2018  8:58 AM 
 
4. Enter the last four digits of your Social Security Number (xxxx) and Date of Birth (mm/dd/yyyy) as indicated and click Submit. You will be taken to the next sign-up page. 5. Create a [employer] ID. This will be your [employer] login ID and cannot be changed, so think of one that is secure and easy to remember. 6. Create a [employer] password. You can change your password at any time. 7. Enter your Password Reset Question and Answer. This can be used at a later time if you forget your password. 8. Enter your e-mail address. You will receive e-mail notification when new information is available in [address] 9. Click Sign Up. You can now view and download portions of your medical record. 10. Click the Download Summary menu link to download a portable copy of your medical information. If you have questions, please visit the Frequently Asked Questions section of the [employer] website. Remember, [employer] is NOT to be used for urgent needs. For medical emergencies, dial 911. Now available from your iPhone and Android! Please provide this summary of care documentation to your next provider. Your primary care clinician is listed as Ty Mascorro. If you have any questions after today's visit, please call [phone].

## 2019-03-12 ENCOUNTER — OFFICE VISIT (OUTPATIENT)
Dept: NEUROLOGY | Age: 34
End: 2019-03-12

## 2019-03-12 VITALS
SYSTOLIC BLOOD PRESSURE: 110 MMHG | DIASTOLIC BLOOD PRESSURE: 70 MMHG | OXYGEN SATURATION: 98 % | WEIGHT: 166 LBS | HEIGHT: 62 IN | HEART RATE: 86 BPM | BODY MASS INDEX: 30.55 KG/M2

## 2019-03-12 DIAGNOSIS — G93.49 CHRONIC STATIC ENCEPHALOPATHY: ICD-10-CM

## 2019-03-12 DIAGNOSIS — G40.909 NONINTRACTABLE EPILEPSY WITHOUT STATUS EPILEPTICUS, UNSPECIFIED EPILEPSY TYPE (HCC): Primary | ICD-10-CM

## 2019-03-12 DIAGNOSIS — F42.9 OBSESSIVE-COMPULSIVE DISORDER, UNSPECIFIED TYPE: ICD-10-CM

## 2019-03-12 RX ORDER — OXCARBAZEPINE 300 MG/1
300 TABLET, FILM COATED ORAL 2 TIMES DAILY
Qty: 180 TAB | Refills: 3 | Status: SHIPPED | OUTPATIENT
Start: 2019-03-12 | End: 2019-04-10

## 2019-03-12 NOTE — PATIENT INSTRUCTIONS
1.  Follow-up in 1 year    Office Policies  · Phone calls/patient messages:  Please allow up to 24 hours for someone in the office to contact you about your call or message. Be mindful your provider may be out of the office or your message may require further review. We encourage you to use [employer] for your messages as this is a faster, more efficient way to communicate with our office  · Medication Refills:  Prescription medications require up to 48 business hours to process. We encourage you to use [employer] for your refills. For controlled medications: Please allow up to 72 business hours to process. Certain medications may require you to  a written prescription at our office. NO narcotic/controlled medications will be prescribed after 4pm Monday through Friday or on weekends  · Form/Paperwork Completion:  Please note there is a $25 fee for all paperwork completed by our providers. We ask that you allow 7-14 business days. Pre-payment is due prior to picking up/faxing the completed form. You may also download your forms to [employer] to have your doctor print off.

## 2019-03-12 NOTE — PROGRESS NOTES
NEUROLOGY FOLLOW UP NOTE    03/12/19    Chief Complaint   Patient presents with    Follow-up     seizures       Subjective  Mabel Terry is a 37 y.o. female who presented to the neurology office for management of seizures. She does have [place] syndrome and was seizure free from [age] yrs of age. She has not had a seizure atleast since 2013. During the last office visit, her Trileptal was decreased from 600 mg p.o. twice daily to 300 mg p.o. twice daily. Patient has not had any breakthrough seizures. Current Outpatient Medications   Medication Sig    OXcarbazepine (TRILEPTAL) 300 mg tablet 300 mg in the morning and 600 mg at night for 2 weeks and then 300 mg p.o. twice daily     No current facility-administered medications for this visit. Allergies   Allergen Reactions    Bees [Sting, Bee] Anaphylaxis    Pertussis Vaccine,Adsorbed Other (comments)       REVIEW OF SYSTEMS:   A ten system review of constitutional, cardiovascular, respiratory, musculoskeletal, endocrine, skin, SHEENT, genitourinary, psychiatric and neurologic systems was obtained and is unremarkable except as stated in HPI    EXAMINATION:   Visit Vitals  /70   Pulse 86   Ht 5' 2\" (1.575 m)   Wt 166 lb (75.3 kg)   SpO2 98%   BMI 30.36 kg/m²        General:   General appearance: Pt is in no acute distress   Distal pulses are preserved    Neurological Examination:   Mental Status: AA. Speech is fluent. Follows commands, has normal fund of knowledge, attention, short term recall, comprehension and insight. Cranial Nerves: Visual fields are full. PERRL, Extraocular movements are full. Facial sensation intact V1- V3. Facial movement intact, symmetric. Hearing intact to conversation. Palate elevates symmetrically. Shoulder shrug symmetric. Tongue midline. Motor: Strength is 5/5 in all 4 ext.     Sensation: Normal to light touch    Coordination/Cerebellar: Intact to finger-nose-finger     Laboratory review:   Results for orders placed or performed during the hospital encounter of 08/21/18   HCG URINE, QL. - POC   Result Value Ref Range    Pregnancy test,urine (POC) NEGATIVE  NEG     POC HEMOGLOBIN   Result Value Ref Range    Hemoglobin (POC) 13.2 11.5 - 16.0 g/dL       Imaging review:     2/19/2013: EEG had right temporal sharp waves that were improved from spike and wave complexes. Documentation review:  None    Assessment/Plan:   Lacy Lockhart is a 37 y.o. 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 She was initially taking Trileptal 600 mg p.o. twice daily which was decreased during the last office visit to 300 mg p.o. twice daily. Patient has been seizure-free for at least the last 5 years. Will continue Trileptal 300 mg p.o. twice daily. Advised the patient to give me a call if there is any recurrence of seizure. Follow-up in 1 year     Over 25 minutes was spent with the patient of which > 50% of the visit was spent counseling on diagnosis, management, and treatment of the diagnosis. I did discuss about medications          ICD-10-CM ICD-9-CM    1. Nonintractable epilepsy without status epilepticus, unspecified epilepsy type ([facility] G40.909 345.90    2. 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 Obsessive-compulsive disorder, unspecified type F42.9 300.3        This note will not be viewable in [employer].

## 2019-04-06 ENCOUNTER — APPOINTMENT (OUTPATIENT)
Dept: GENERAL RADIOLOGY | Age: 34
DRG: 100 | End: 2019-04-06
Attending: EMERGENCY MEDICINE
Payer: MEDICARE

## 2019-04-06 ENCOUNTER — APPOINTMENT (OUTPATIENT)
Dept: GENERAL RADIOLOGY | Age: 34
DRG: 100 | End: 2019-04-06
Attending: INTERNAL MEDICINE
Payer: MEDICARE

## 2019-04-06 ENCOUNTER — HOSPITAL ENCOUNTER (INPATIENT)
Age: 34
LOS: 4 days | Discharge: HOME | DRG: 100 | End: 2019-04-10
Attending: EMERGENCY MEDICINE | Admitting: INTERNAL MEDICINE
Payer: MEDICARE

## 2019-04-06 ENCOUNTER — APPOINTMENT (OUTPATIENT)
Dept: CT IMAGING | Age: 34
DRG: 100 | End: 2019-04-06
Attending: PHYSICIAN ASSISTANT
Payer: MEDICARE

## 2019-04-06 ENCOUNTER — APPOINTMENT (OUTPATIENT)
Dept: GENERAL RADIOLOGY | Age: 34
DRG: 100 | End: 2019-04-06
Attending: PHYSICIAN ASSISTANT
Payer: MEDICARE

## 2019-04-06 DIAGNOSIS — G40.901 STATUS EPILEPTICUS (HCC): Primary | ICD-10-CM

## 2019-04-06 DIAGNOSIS — J96.01 ACUTE RESPIRATORY FAILURE WITH HYPOXIA (HCC): ICD-10-CM

## 2019-04-06 DIAGNOSIS — G40.822 WEST SYNDROME (HCC): ICD-10-CM

## 2019-04-06 LAB
ALBUMIN SERPL-MCNC: 4.4 G/DL (ref 3.5–5)
ALBUMIN/GLOB SERPL: 1 {RATIO} (ref 1.1–2.2)
ALP SERPL-CCNC: 95 U/L (ref 45–117)
ALT SERPL-CCNC: 18 U/L (ref 12–78)
AMORPH CRY URNS QL MICRO: ABNORMAL
AMPHET UR QL SCN: NEGATIVE
ANION GAP SERPL CALC-SCNC: 26 MMOL/L (ref 5–15)
APPEARANCE UR: ABNORMAL
ARTERIAL PATENCY WRIST A: YES
AST SERPL-CCNC: 12 U/L (ref 15–37)
BACTERIA URNS QL MICRO: NEGATIVE /HPF
BARBITURATES UR QL SCN: NEGATIVE
BASE DEFICIT BLD-SCNC: 10 MMOL/L
BASOPHILS # BLD: 0 K/UL (ref 0–0.1)
BASOPHILS NFR BLD: 0 % (ref 0–1)
BDY SITE: ABNORMAL
BENZODIAZ UR QL: NEGATIVE
BILIRUB SERPL-MCNC: 0.2 MG/DL (ref 0.2–1)
BILIRUB UR QL: NEGATIVE
BUN SERPL-MCNC: 10 MG/DL (ref 6–20)
BUN/CREAT SERPL: 10 (ref 12–20)
CALCIUM SERPL-MCNC: 7.8 MG/DL (ref 8.5–10.1)
CALCIUM SERPL-MCNC: 9 MG/DL (ref 8.5–10.1)
CANNABINOIDS UR QL SCN: NEGATIVE
CHLORIDE SERPL-SCNC: 104 MMOL/L (ref 97–108)
CO2 SERPL-SCNC: 7 MMOL/L (ref 21–32)
COCAINE UR QL SCN: NEGATIVE
COLOR UR: ABNORMAL
COMMENT, HOLDF: NORMAL
CREAT SERPL-MCNC: 1 MG/DL (ref 0.55–1.02)
DIFFERENTIAL METHOD BLD: ABNORMAL
DRUG SCRN COMMENT,DRGCM: NORMAL
EOSINOPHIL # BLD: 0.6 K/UL (ref 0–0.4)
EOSINOPHIL NFR BLD: 3 % (ref 0–7)
EPITH CASTS URNS QL MICRO: ABNORMAL /LPF
ERYTHROCYTE [DISTWIDTH] IN BLOOD BY AUTOMATED COUNT: 12.7 % (ref 11.5–14.5)
GAS FLOW.O2 O2 DELIVERY SYS: ABNORMAL L/MIN
GAS FLOW.O2 SETTING OXYMISER: 14 BPM
GASTROCULT GAST QL: POSITIVE
GLOBULIN SER CALC-MCNC: 4.6 G/DL (ref 2–4)
GLUCOSE BLD STRIP.AUTO-MCNC: 201 MG/DL (ref 65–100)
GLUCOSE BLD STRIP.AUTO-MCNC: 93 MG/DL (ref 65–100)
GLUCOSE SERPL-MCNC: 217 MG/DL (ref 65–100)
GLUCOSE UR STRIP.AUTO-MCNC: NEGATIVE MG/DL
HCO3 BLD-SCNC: 17.7 MMOL/L (ref 22–26)
HCT VFR BLD AUTO: 39.5 % (ref 35–47)
HCT VFR BLD AUTO: 49.1 % (ref 35–47)
HGB BLD-MCNC: 13.4 G/DL (ref 11.5–16)
HGB BLD-MCNC: 15.5 G/DL (ref 11.5–16)
HGB UR QL STRIP: ABNORMAL
IMM GRANULOCYTES # BLD AUTO: 0 K/UL (ref 0–0.04)
IMM GRANULOCYTES NFR BLD AUTO: 0 % (ref 0–0.5)
KETONES UR QL STRIP.AUTO: NEGATIVE MG/DL
LACTATE BLD-SCNC: >16 MMOL/L (ref 0.4–2)
LACTATE SERPL-SCNC: 2.1 MMOL/L (ref 0.4–2)
LEUKOCYTE ESTERASE UR QL STRIP.AUTO: ABNORMAL
LYMPHOCYTES # BLD: 5.2 K/UL (ref 0.8–3.5)
LYMPHOCYTES NFR BLD: 28 % (ref 12–49)
MAGNESIUM SERPL-MCNC: 2.5 MG/DL (ref 1.6–2.4)
MCH RBC QN AUTO: 29.2 PG (ref 26–34)
MCHC RBC AUTO-ENTMCNC: 31.6 G/DL (ref 30–36.5)
MCV RBC AUTO: 92.6 FL (ref 80–99)
METAMYELOCYTES NFR BLD MANUAL: 1 %
METHADONE UR QL: NEGATIVE
MONOCYTES # BLD: 0.9 K/UL (ref 0–1)
MONOCYTES NFR BLD: 5 % (ref 5–13)
MYELOCYTES NFR BLD MANUAL: 1 %
NEUTS BAND NFR BLD MANUAL: 4 %
NEUTS SEG # BLD: 11.4 K/UL (ref 1.8–8)
NEUTS SEG NFR BLD: 58 % (ref 32–75)
NITRITE UR QL STRIP.AUTO: NEGATIVE
NRBC # BLD: 0 K/UL (ref 0–0.01)
NRBC BLD-RTO: 0 PER 100 WBC
O2/TOTAL GAS SETTING VFR VENT: 100 %
OPIATES UR QL: NEGATIVE
PCO2 BLD: 44.7 MMHG (ref 35–45)
PCP UR QL: NEGATIVE
PEEP RESPIRATORY: 6 CMH2O
PH BLD: 7.2 [PH] (ref 7.35–7.45)
PH GAST: 4 [PH] (ref 1.5–3.5)
PH UR STRIP: 5 [PH] (ref 5–8)
PLATELET # BLD AUTO: 477 K/UL (ref 150–400)
PMV BLD AUTO: 10 FL (ref 8.9–12.9)
PO2 BLD: 421 MMHG (ref 80–100)
POTASSIUM SERPL-SCNC: 3.6 MMOL/L (ref 3.5–5.1)
PROT SERPL-MCNC: 9 G/DL (ref 6.4–8.2)
PROT UR STRIP-MCNC: 100 MG/DL
RBC # BLD AUTO: 5.3 M/UL (ref 3.8–5.2)
RBC #/AREA URNS HPF: ABNORMAL /HPF (ref 0–5)
RBC MORPH BLD: ABNORMAL
SAMPLES BEING HELD,HOLD: NORMAL
SAO2 % BLD: 100 % (ref 92–97)
SERVICE CMNT-IMP: ABNORMAL
SERVICE CMNT-IMP: NORMAL
SODIUM SERPL-SCNC: 137 MMOL/L (ref 136–145)
SP GR UR REFRACTOMETRY: 1.01 (ref 1–1.03)
SPECIMEN TYPE: ABNORMAL
TOTAL RESP. RATE, ITRR: 16
UA: UC IF INDICATED,UAUC: ABNORMAL
UROBILINOGEN UR QL STRIP.AUTO: 0.2 EU/DL (ref 0.2–1)
VENTILATION MODE VENT: ABNORMAL
VT SETTING VENT: 500 ML
WBC # BLD AUTO: 18.4 K/UL (ref 3.6–11)
WBC URNS QL MICRO: ABNORMAL /HPF (ref 0–4)

## 2019-04-06 PROCEDURE — 74011250636 HC RX REV CODE- 250/636: Performed by: INTERNAL MEDICINE

## 2019-04-06 PROCEDURE — 77030034850

## 2019-04-06 PROCEDURE — 96361 HYDRATE IV INFUSION ADD-ON: CPT

## 2019-04-06 PROCEDURE — 87040 BLOOD CULTURE FOR BACTERIA: CPT

## 2019-04-06 PROCEDURE — 82803 BLOOD GASES ANY COMBINATION: CPT

## 2019-04-06 PROCEDURE — 94002 VENT MGMT INPAT INIT DAY: CPT

## 2019-04-06 PROCEDURE — C9113 INJ PANTOPRAZOLE SODIUM, VIA: HCPCS | Performed by: INTERNAL MEDICINE

## 2019-04-06 PROCEDURE — 31500 INSERT EMERGENCY AIRWAY: CPT

## 2019-04-06 PROCEDURE — 93005 ELECTROCARDIOGRAM TRACING: CPT

## 2019-04-06 PROCEDURE — 77030021678 HC GLIDESCP STAT DISP VERT -B

## 2019-04-06 PROCEDURE — 74011250637 HC RX REV CODE- 250/637: Performed by: INTERNAL MEDICINE

## 2019-04-06 PROCEDURE — 96374 THER/PROPH/DIAG INJ IV PUSH: CPT

## 2019-04-06 PROCEDURE — 51702 INSERT TEMP BLADDER CATH: CPT

## 2019-04-06 PROCEDURE — 82962 GLUCOSE BLOOD TEST: CPT

## 2019-04-06 PROCEDURE — 96365 THER/PROPH/DIAG IV INF INIT: CPT

## 2019-04-06 PROCEDURE — 95816 EEG AWAKE AND DROWSY: CPT | Performed by: INTERNAL MEDICINE

## 2019-04-06 PROCEDURE — 36600 WITHDRAWAL OF ARTERIAL BLOOD: CPT

## 2019-04-06 PROCEDURE — 74011000258 HC RX REV CODE- 258: Performed by: INTERNAL MEDICINE

## 2019-04-06 PROCEDURE — 77030037878 HC DRSG MEPILEX >48IN BORD MOLN -B

## 2019-04-06 PROCEDURE — 77030008683 HC TU ET CUF COVD -A

## 2019-04-06 PROCEDURE — 99285 EMERGENCY DEPT VISIT HI MDM: CPT

## 2019-04-06 PROCEDURE — 77030011943

## 2019-04-06 PROCEDURE — 80053 COMPREHEN METABOLIC PANEL: CPT

## 2019-04-06 PROCEDURE — 74011250636 HC RX REV CODE- 250/636: Performed by: EMERGENCY MEDICINE

## 2019-04-06 PROCEDURE — 83605 ASSAY OF LACTIC ACID: CPT

## 2019-04-06 PROCEDURE — 36415 COLL VENOUS BLD VENIPUNCTURE: CPT

## 2019-04-06 PROCEDURE — 74011000250 HC RX REV CODE- 250: Performed by: INTERNAL MEDICINE

## 2019-04-06 PROCEDURE — 85018 HEMOGLOBIN: CPT

## 2019-04-06 PROCEDURE — 85025 COMPLETE CBC W/AUTO DIFF WBC: CPT

## 2019-04-06 PROCEDURE — 0BH17EZ INSERTION OF ENDOTRACHEAL AIRWAY INTO TRACHEA, VIA NATURAL OR ARTIFICIAL OPENING: ICD-10-PCS | Performed by: EMERGENCY MEDICINE

## 2019-04-06 PROCEDURE — 71045 X-RAY EXAM CHEST 1 VIEW: CPT

## 2019-04-06 PROCEDURE — 74011000258 HC RX REV CODE- 258: Performed by: PHYSICIAN ASSISTANT

## 2019-04-06 PROCEDURE — 74011250636 HC RX REV CODE- 250/636: Performed by: PHYSICIAN ASSISTANT

## 2019-04-06 PROCEDURE — 77030034848

## 2019-04-06 PROCEDURE — 70450 CT HEAD/BRAIN W/O DYE: CPT

## 2019-04-06 PROCEDURE — 80183 DRUG SCRN QUANT OXCARBAZEPIN: CPT

## 2019-04-06 PROCEDURE — 82271 OCCULT BLOOD OTHER SOURCES: CPT

## 2019-04-06 PROCEDURE — 82310 ASSAY OF CALCIUM: CPT

## 2019-04-06 PROCEDURE — 81001 URINALYSIS AUTO W/SCOPE: CPT

## 2019-04-06 PROCEDURE — 74011000258 HC RX REV CODE- 258: Performed by: PSYCHIATRY & NEUROLOGY

## 2019-04-06 PROCEDURE — 74018 RADEX ABDOMEN 1 VIEW: CPT

## 2019-04-06 PROCEDURE — 65620000000 HC RM CCU GENERAL

## 2019-04-06 PROCEDURE — 83735 ASSAY OF MAGNESIUM: CPT

## 2019-04-06 PROCEDURE — 96375 TX/PRO/DX INJ NEW DRUG ADDON: CPT

## 2019-04-06 PROCEDURE — 74011250636 HC RX REV CODE- 250/636: Performed by: PSYCHIATRY & NEUROLOGY

## 2019-04-06 PROCEDURE — 5A1945Z RESPIRATORY VENTILATION, 24-96 CONSECUTIVE HOURS: ICD-10-PCS | Performed by: INTERNAL MEDICINE

## 2019-04-06 PROCEDURE — 77030008768 HC TU NG VYGC -A

## 2019-04-06 PROCEDURE — 80307 DRUG TEST PRSMV CHEM ANLYZR: CPT

## 2019-04-06 PROCEDURE — 74011250636 HC RX REV CODE- 250/636

## 2019-04-06 PROCEDURE — 77030013079 HC BLNKT BAIR HGGR 3M -A

## 2019-04-06 PROCEDURE — 0D9670Z DRAINAGE OF STOMACH WITH DRAINAGE DEVICE, VIA NATURAL OR ARTIFICIAL OPENING: ICD-10-PCS | Performed by: EMERGENCY MEDICINE

## 2019-04-06 RX ORDER — NORGESTIMATE AND ETHINYL ESTRADIOL 7DAYSX3 28
KIT ORAL
Status: ON HOLD | COMMUNITY
End: 2019-04-08

## 2019-04-06 RX ORDER — SODIUM CHLORIDE 450 MG/100ML
125 INJECTION, SOLUTION INTRAVENOUS CONTINUOUS
Status: DISCONTINUED | OUTPATIENT
Start: 2019-04-06 | End: 2019-04-08

## 2019-04-06 RX ORDER — DEXTROSE MONOHYDRATE 25 G/50ML
12.5-25 INJECTION, SOLUTION INTRAVENOUS AS NEEDED
Status: DISCONTINUED | OUTPATIENT
Start: 2019-04-06 | End: 2019-04-10 | Stop reason: HOSPADM

## 2019-04-06 RX ORDER — INSULIN LISPRO 100 [IU]/ML
INJECTION, SOLUTION INTRAVENOUS; SUBCUTANEOUS EVERY 6 HOURS
Status: DISCONTINUED | OUTPATIENT
Start: 2019-04-06 | End: 2019-04-10 | Stop reason: HOSPADM

## 2019-04-06 RX ORDER — CHLORHEXIDINE GLUCONATE 1.2 MG/ML
15 RINSE ORAL EVERY 12 HOURS
Status: DISCONTINUED | OUTPATIENT
Start: 2019-04-06 | End: 2019-04-09

## 2019-04-06 RX ORDER — LORAZEPAM 2 MG/ML
1 INJECTION INTRAMUSCULAR
Status: DISCONTINUED | OUTPATIENT
Start: 2019-04-06 | End: 2019-04-10 | Stop reason: HOSPADM

## 2019-04-06 RX ORDER — PROPOFOL 10 MG/ML
INJECTION, EMULSION INTRAVENOUS
Status: COMPLETED
Start: 2019-04-06 | End: 2019-04-06

## 2019-04-06 RX ORDER — LEVETIRACETAM 10 MG/ML
1000 INJECTION INTRAVASCULAR
Status: COMPLETED | OUTPATIENT
Start: 2019-04-06 | End: 2019-04-06

## 2019-04-06 RX ORDER — FLUTICASONE PROPIONATE 50 MCG
2 SPRAY, SUSPENSION (ML) NASAL DAILY
Status: ON HOLD | COMMUNITY
End: 2019-04-08

## 2019-04-06 RX ORDER — SODIUM CHLORIDE 0.9 % (FLUSH) 0.9 %
5-40 SYRINGE (ML) INJECTION EVERY 8 HOURS
Status: DISCONTINUED | OUTPATIENT
Start: 2019-04-06 | End: 2019-04-10 | Stop reason: HOSPADM

## 2019-04-06 RX ORDER — HEPARIN SODIUM 5000 [USP'U]/ML
5000 INJECTION, SOLUTION INTRAVENOUS; SUBCUTANEOUS EVERY 8 HOURS
Status: DISCONTINUED | OUTPATIENT
Start: 2019-04-06 | End: 2019-04-06

## 2019-04-06 RX ORDER — LORAZEPAM 2 MG/ML
INJECTION INTRAMUSCULAR
Status: COMPLETED
Start: 2019-04-06 | End: 2019-04-06

## 2019-04-06 RX ORDER — LORAZEPAM 2 MG/ML
1 INJECTION INTRAMUSCULAR
Status: COMPLETED | OUTPATIENT
Start: 2019-04-06 | End: 2019-04-06

## 2019-04-06 RX ORDER — MIDAZOLAM HYDROCHLORIDE 1 MG/ML
5 INJECTION, SOLUTION INTRAMUSCULAR; INTRAVENOUS
Status: COMPLETED | OUTPATIENT
Start: 2019-04-06 | End: 2019-04-06

## 2019-04-06 RX ORDER — PROPOFOL 10 MG/ML
0-50 VIAL (ML) INTRAVENOUS
Status: DISCONTINUED | OUTPATIENT
Start: 2019-04-06 | End: 2019-04-08

## 2019-04-06 RX ORDER — PROPOFOL 10 MG/ML
INJECTION, EMULSION INTRAVENOUS
Status: DISCONTINUED
Start: 2019-04-06 | End: 2019-04-06 | Stop reason: WASHOUT

## 2019-04-06 RX ORDER — SODIUM CHLORIDE 0.9 % (FLUSH) 0.9 %
5-40 SYRINGE (ML) INJECTION AS NEEDED
Status: DISCONTINUED | OUTPATIENT
Start: 2019-04-06 | End: 2019-04-10 | Stop reason: HOSPADM

## 2019-04-06 RX ORDER — INSULIN LISPRO 100 [IU]/ML
INJECTION, SOLUTION INTRAVENOUS; SUBCUTANEOUS
Status: DISCONTINUED | OUTPATIENT
Start: 2019-04-06 | End: 2019-04-06

## 2019-04-06 RX ORDER — MUPIROCIN 20 MG/G
OINTMENT TOPICAL EVERY 12 HOURS
Status: DISCONTINUED | OUTPATIENT
Start: 2019-04-06 | End: 2019-04-10 | Stop reason: HOSPADM

## 2019-04-06 RX ORDER — MEDROXYPROGESTERONE ACETATE 150 MG/ML
150 INJECTION, SUSPENSION INTRAMUSCULAR ONCE
Status: ON HOLD | COMMUNITY
End: 2019-04-08

## 2019-04-06 RX ORDER — MIDAZOLAM HYDROCHLORIDE 5 MG/ML
INJECTION, SOLUTION INTRAMUSCULAR; INTRAVENOUS
Status: COMPLETED
Start: 2019-04-06 | End: 2019-04-06

## 2019-04-06 RX ORDER — PANTOPRAZOLE SODIUM 40 MG/10ML
40 INJECTION, POWDER, LYOPHILIZED, FOR SOLUTION INTRAVENOUS EVERY 12 HOURS
Status: DISCONTINUED | OUTPATIENT
Start: 2019-04-06 | End: 2019-04-06

## 2019-04-06 RX ORDER — MAGNESIUM SULFATE 100 %
4 CRYSTALS MISCELLANEOUS AS NEEDED
Status: DISCONTINUED | OUTPATIENT
Start: 2019-04-06 | End: 2019-04-10 | Stop reason: HOSPADM

## 2019-04-06 RX ORDER — SUCCINYLCHOLINE CHLORIDE 20 MG/ML
100 INJECTION INTRAMUSCULAR; INTRAVENOUS
Status: COMPLETED | OUTPATIENT
Start: 2019-04-06 | End: 2019-04-06

## 2019-04-06 RX ORDER — SODIUM CHLORIDE 0.9 % (FLUSH) 0.9 %
5-10 SYRINGE (ML) INJECTION AS NEEDED
Status: DISCONTINUED | OUTPATIENT
Start: 2019-04-06 | End: 2019-04-10 | Stop reason: HOSPADM

## 2019-04-06 RX ORDER — LORATADINE 10 MG/1
10 TABLET ORAL
Status: ON HOLD | COMMUNITY
End: 2019-04-08

## 2019-04-06 RX ADMIN — CHLORHEXIDINE GLUCONATE 15 ML: 1.2 RINSE ORAL at 21:22

## 2019-04-06 RX ADMIN — MIDAZOLAM HYDROCHLORIDE 5 MG: 1 INJECTION, SOLUTION INTRAMUSCULAR; INTRAVENOUS at 13:31

## 2019-04-06 RX ADMIN — Medication 10 ML: at 13:33

## 2019-04-06 RX ADMIN — PANTOPRAZOLE SODIUM 40 MG: 40 INJECTION, POWDER, FOR SOLUTION INTRAVENOUS at 21:22

## 2019-04-06 RX ADMIN — Medication 10 ML: at 21:23

## 2019-04-06 RX ADMIN — FAMOTIDINE 20 MG: 10 INJECTION, SOLUTION INTRAVENOUS at 13:31

## 2019-04-06 RX ADMIN — MUPIROCIN: 20 OINTMENT TOPICAL at 21:23

## 2019-04-06 RX ADMIN — LORAZEPAM 2 MG: 2 INJECTION INTRAMUSCULAR; INTRAVENOUS at 11:25

## 2019-04-06 RX ADMIN — LEVETIRACETAM 1000 MG: 100 INJECTION, SOLUTION, CONCENTRATE INTRAVENOUS at 21:14

## 2019-04-06 RX ADMIN — SODIUM CHLORIDE 125 ML/HR: 450 INJECTION, SOLUTION INTRAVENOUS at 15:17

## 2019-04-06 RX ADMIN — SODIUM CHLORIDE 193 ML: 900 INJECTION, SOLUTION INTRAVENOUS at 13:10

## 2019-04-06 RX ADMIN — LORAZEPAM 2 MG: 2 INJECTION INTRAMUSCULAR at 11:25

## 2019-04-06 RX ADMIN — PROPOFOL 50 MCG/KG/MIN: 10 INJECTION, EMULSION INTRAVENOUS at 21:06

## 2019-04-06 RX ADMIN — PROPOFOL 40 MCG/KG/MIN: 10 INJECTION, EMULSION INTRAVENOUS at 15:21

## 2019-04-06 RX ADMIN — SODIUM CHLORIDE 1000 ML: 900 INJECTION, SOLUTION INTRAVENOUS at 10:15

## 2019-04-06 RX ADMIN — PROPOFOL 20 MCG/KG/MIN: 10 INJECTION, EMULSION INTRAVENOUS at 11:47

## 2019-04-06 RX ADMIN — SODIUM CHLORIDE 1000 ML: 900 INJECTION, SOLUTION INTRAVENOUS at 11:12

## 2019-04-06 RX ADMIN — LEVETIRACETAM 1000 MG: 10 INJECTION INTRAVENOUS at 11:05

## 2019-04-06 RX ADMIN — PROPOFOL 50 MCG/KG/MIN: 10 INJECTION, EMULSION INTRAVENOUS at 17:15

## 2019-04-06 RX ADMIN — SUCCINYLCHOLINE CHLORIDE 100 MG: 20 INJECTION, SOLUTION INTRAMUSCULAR; INTRAVENOUS; PARENTERAL at 11:39

## 2019-04-06 RX ADMIN — MIDAZOLAM HYDROCHLORIDE 5 MG: 5 INJECTION, SOLUTION INTRAMUSCULAR; INTRAVENOUS at 13:31

## 2019-04-06 RX ADMIN — SODIUM CHLORIDE 125 ML/HR: 450 INJECTION, SOLUTION INTRAVENOUS at 18:54

## 2019-04-06 NOTE — CONSULTS
[facility]  Pulmonary, Critical Care, and Sleep Medicine    Name: Mary Lundy MRN: [id number]   : 1985 Hospital: [facility]   Date: 2019        Critical Care Initial Patient Consult    IMPRESSION:   · Recurrent seizures   · Respiratory failure and intubated for airway protection secondary to above  · [place] Syndrome  · H/O Shunt and spine surgery  · Group home resident   · Last seizure 14 years ago  · Severe acidosis      RECOMMENDATIONS:   · Vent support, adjust per ABG  · Neurology consult   · IV propofol  · Bicarb PRN   · Seizure meds per Neurology  · ICU protocols   · DVT and GI prophylaxis  · Guarded outlook     Subjective/History: This patient has been seen and evaluated at the request of Dr. Missy Navarro for respiratory failure. Patient is a 37 y.o. female with h/o [place] Syndrome, seizure d/o and h/o spine surgery and shunt. Last seizure was apparently 14 yrs ago. Sees Dr. Maine Flowers. Seizure meds were being adjusted. At group home today had a grand mal seizure and two more seizures on the way here. Seized again in ED and intubated for airway protection. Now intubated and sedated on Propofol. D/W mom and caretaker at bedside. 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 Authorizing Provider   norgestimate-ethinyl estradiol (TRINESSA, 28,) 0.18/0.215/0.25 mg-35 mcg (28) tab Take  by mouth.    Yes Other, MD Nicolasa   loratadine (CLARITIN) 10 mg tablet Take 10 mg by mouth. Yes Delbert, MD Nicolasa   fluticasone propionate (FLONASE) 50 mcg/actuation nasal spray 2 Sprays by Both Nostrils route daily. Yes Delbert, MD Nicolasa   medroxyPROGESTERone (DEPO-PROVERA) 150 mg/mL injection 150 mg by IntraMUSCular route once. Yes Other, MD Nicolasa   OXcarbazepine (TRILEPTAL) 300 mg tablet Take 1 Tab by mouth two (2) times a day. 3/12/19  Yes Elodia Liao MD     Current Facility-Administered Medications   Medication Dose Route Frequency    sodium chloride 0.9 % bolus infusion 1,000 mL  1,000 mL IntraVENous ONCE    Followed by   [facility] sodium chloride 0.9 % bolus infusion 193 mL  193 mL IntraVENous ONCE    propofol (DIPRIVAN) infusion  0-50 mcg/kg/min IntraVENous TITRATE     Allergies   Allergen Reactions    Bees [Sting, Bee] Anaphylaxis    Pertussis Vaccine,Adsorbed Other (comments)      Social History     Tobacco Use    Smoking status: Never Smoker    Smokeless tobacco: Never Used   Substance Use Topics    Alcohol use: No      History reviewed. No pertinent family history. Review of Systems:  Unobtainable     Objective:   Vital Signs:    Visit Vitals  /76   Pulse (!) 120   Temp (P) 97 °F (36.1 °C)   Resp 19   Ht 5' 2\" (1.575 m)   Wt 73.1 kg (161 lb 2.5 oz)   SpO2 97%   BMI 29.48 kg/m²       O2 Device: Room air   O2 Flow Rate (L/min): 2 l/min   Temp (24hrs), Av.5 °F (35.8 °C), Min:95.4 °F (35.2 °C), Max:97.2 °F (36.2 °C)       Intake/Output:   Last shift:       07 -  1900  In: 100 [I.V.:100]  Out: -   Last 3 shifts: No intake/output data recorded.     Intake/Output Summary (Last 24 hours) at 2019 1253  Last data filed at 2019 1120  Gross per 24 hour   Intake 100 ml   Output --   Net 100 ml     Hemodynamics:   PAP:   CO:     Wedge:   CI:     CVP:    SVR:       PVR:       Ventilator Settings:  Mode Rate Tidal Volume Pressure FiO2 PEEP   Assist control   500 ml    100 % 6 cm H20     Peak airway pressure: 25 cm H2O    Minute ventilation: 10.8 l/min Physical Exam:    General:  Intubated and sedated    Head:  Normocephalic, without obvious abnormality, atraumatic. Eyes:  Conjunctivae/corneas clear. PERRL, EOMs intact. Nose: Nares normal. Septum midline. Mucosa normal. No drainage or sinus tenderness. Throat: Intubated, vent    Neck: Supple, symmetrical, trachea midline, no adenopathy, thyroid: no enlargment/tenderness/nodules, no carotid bruit and no JVD. Back:   Symmetric, no curvature. ROM normal.   Lungs:   Decreased breath sounds    Chest wall:  No tenderness or deformity. Heart:  Regular rate and rhythm, S1, S2 normal, no murmur, click, rub or gallop. Abdomen:   Soft, non-tender. Bowel sounds normal. No masses,  No organomegaly. Extremities: Extremities normal, atraumatic, no cyanosis or edema. Pulses: 2+ and symmetric all extremities.    Skin: Skin color, texture, turgor normal. No rashes or lesions   Lymph nodes: Cervical, supraclavicular, and axillary nodes normal.   Neurologic: Sedated        Data:     Recent Results (from the past 24 hour(s))   EKG, 12 LEAD, INITIAL    Collection Time: 04/06/19  9:55 AM   Result Value Ref Range    Ventricular Rate 97 BPM    Atrial Rate 97 BPM    P-R Interval 170 ms    QRS Duration 90 ms    Q-T Interval 362 ms    QTC Calculation (Bezet) 459 ms    Calculated P Axis 39 degrees    Calculated R Axis -4 degrees    Calculated T Axis 27 degrees    Diagnosis       Normal sinus rhythm  Minimal voltage criteria for LVH, may be normal variant  No previous ECGs available     GLUCOSE, POC    Collection Time: 04/06/19 10:06 AM   Result Value Ref Range    Glucose (POC) 201 (H) 65 - 100 mg/dL    Performed by Ailin Napoles    CBC WITH AUTOMATED DIFF    Collection Time: 04/06/19 10:17 AM   Result Value Ref Range    WBC 18.4 (H) 3.6 - 11.0 K/uL    RBC 5.30 (H) 3.80 - 5.20 M/uL    HGB 15.5 11.5 - 16.0 g/dL    HCT 49.1 (H) 35.0 - 47.0 %    MCV 92.6 80.0 - 99.0 FL    MCH 29.2 26.0 - 34.0 PG    MCHC 31.6 30.0 - 36.5 g/dL RDW 12.7 11.5 - 14.5 %    PLATELET 062 (H) 269 - 400 K/uL    MPV 10.0 8.9 - 12.9 FL    NRBC 0.0 0  WBC    ABSOLUTE NRBC 0.00 0.00 - 0.01 K/uL    NEUTROPHILS 58 32 - 75 %    BAND NEUTROPHILS 4 %    LYMPHOCYTES 28 12 - 49 %    MONOCYTES 5 5 - 13 %    EOSINOPHILS 3 0 - 7 %    BASOPHILS 0 0 - 1 %    METAMYELOCYTES 1 %    MYELOCYTES 1 %    IMMATURE GRANULOCYTES 0 0.0 - 0.5 %    ABS. NEUTROPHILS 11.4 (H) 1.8 - 8.0 K/UL    ABS. LYMPHOCYTES 5.2 (H) 0.8 - 3.5 K/UL    ABS. MONOCYTES 0.9 0.0 - 1.0 K/UL    ABS. EOSINOPHILS 0.6 (H) 0.0 - 0.4 K/UL    ABS. BASOPHILS 0.0 0.0 - 0.1 K/UL    ABS. IMM. GRANS. 0.0 0.00 - 0.04 K/UL    DF MANUAL      RBC COMMENTS NORMOCYTIC, NORMOCHROMIC     METABOLIC PANEL, COMPREHENSIVE    Collection Time: 04/06/19 10:17 AM   Result Value Ref Range    Sodium 137 136 - 145 mmol/L    Potassium 3.6 3.5 - 5.1 mmol/L    Chloride 104 97 - 108 mmol/L    CO2 7 (LL) 21 - 32 mmol/L    Anion gap 26 (H) 5 - 15 mmol/L    Glucose 217 (H) 65 - 100 mg/dL    BUN 10 6 - 20 MG/DL    Creatinine 1.00 0.55 - 1.02 MG/DL    BUN/Creatinine ratio 10 (L) 12 - 20      GFR est AA >60 >60 ml/min/1.73m2    GFR est non-AA >60 >60 ml/min/1.73m2    Calcium 9.0 8.5 - 10.1 MG/DL    Bilirubin, total 0.2 0.2 - 1.0 MG/DL    ALT (SGPT) 18 12 - 78 U/L    AST (SGOT) 12 (L) 15 - 37 U/L    Alk. phosphatase 95 45 - 117 U/L    Protein, total 9.0 (H) 6.4 - 8.2 g/dL    Albumin 4.4 3.5 - 5.0 g/dL    Globulin 4.6 (H) 2.0 - 4.0 g/dL    A-G Ratio 1.0 (L) 1.1 - 2.2     SAMPLES BEING HELD    Collection Time: 04/06/19 10:17 AM   Result Value Ref Range    SAMPLES BEING HELD RED     COMMENT        Add-on orders for these samples will be processed based on acceptable specimen integrity and analyte stability, which may vary by analyte.    URINALYSIS W/ REFLEX CULTURE    Collection Time: 04/06/19 10:17 AM   Result Value Ref Range    Color YELLOW/STRAW      Appearance CLOUDY (A) CLEAR      Specific gravity 1.012 1.003 - 1.030      pH (UA) 5.0 5.0 - 8.0 Protein 100 (A) NEG mg/dL    Glucose NEGATIVE  NEG mg/dL    Ketone NEGATIVE  NEG mg/dL    Bilirubin NEGATIVE  NEG      Blood MODERATE (A) NEG      Urobilinogen 0.2 0.2 - 1.0 EU/dL    Nitrites NEGATIVE  NEG      Leukocyte Esterase TRACE (A) NEG      WBC 0-4 0 - 4 /hpf    RBC 0-5 0 - 5 /hpf    Epithelial cells FEW FEW /lpf    Bacteria NEGATIVE  NEG /hpf    UA:UC IF INDICATED CULTURE NOT INDICATED BY UA RESULT CNI      Amorphous Crystals 1+ (A) NEG   DRUG SCREEN, URINE    Collection Time: 04/06/19 10:17 AM   Result Value Ref Range    AMPHETAMINES NEGATIVE  NEG      BARBITURATES NEGATIVE  NEG      BENZODIAZEPINES NEGATIVE  NEG      COCAINE NEGATIVE  NEG      METHADONE NEGATIVE  NEG      OPIATES NEGATIVE  NEG      PCP(PHENCYCLIDINE) NEGATIVE  NEG      THC (TH-CANNABINOL) NEGATIVE  NEG      Drug screen comment (NOTE)                Imaging:  I have personally reviewed the patients radiographs and have reviewed the reports:  ET in place, 11mm above jewell, to be retracted 2 cm.  Spine hardware in place      D/W Nursing and RT   Total critical care time exclusive of procedures: 35  minutes  Wilson Carcamo MD

## 2019-04-06 NOTE — ED NOTES
TRANSFER - OUT REPORT:    Verbal report given to KIMBERLY Glover on Trudi Causey  being transferred to [facility] ICU(unit) for routine progression of care       Report consisted of patients Situation, Background, Assessment and   Recommendations(SBAR). Information from the following report(s) SBAR and ED Summary was reviewed with the receiving nurse. Lines:   Peripheral IV 04/06/19 Left Wrist (Active)   Site Assessment Clean, dry, & intact 4/6/2019 10:12 AM   Phlebitis Assessment 0 4/6/2019 10:12 AM   Infiltration Assessment 0 4/6/2019 10:12 AM   Dressing Status Clean, dry, & intact 4/6/2019 10:12 AM   Dressing Type Tape;Transparent 4/6/2019 10:12 AM   Hub Color/Line Status Flushed 4/6/2019 10:12 AM       Peripheral IV 04/06/19 Right Antecubital (Active)   Site Assessment Clean, dry, & intact 4/6/2019 10:13 AM   Phlebitis Assessment 0 4/6/2019 10:13 AM   Infiltration Assessment 0 4/6/2019 10:13 AM   Dressing Status Clean, dry, & intact 4/6/2019 10:13 AM   Dressing Type Tape;Transparent 4/6/2019 10:13 AM   Hub Color/Line Status Pink;Flushed 4/6/2019 10:13 AM        Opportunity for questions and clarification was provided.       Patient transported with:  RN  Monitor/

## 2019-04-06 NOTE — ED NOTES
11:10 Called respiratory to place patient on venti mask. Respiratory states \"ED completes that. Not respiratory. Will place patient on venti mask. \"    1113: No venti masks found in room. Called respiratory requesting that she bring venti mask to ED. States to put patient on non-rebreather until respiratory can get to bedside. 1118: Patient placed on non rebreather. 1123: Patient having another seizure. Called respiratory again to have them come to bedside. 1133: Respiratory and MD Marroquin Shall at bedside. Preparing to intubate patient. Caregiver states she has attempted 2 times to reach mother. States she will try again shortly. 1136: 74mg of propfol given at this time by Dr. Marroquin Shall via IVP.

## 2019-04-06 NOTE — ED NOTES
Patient actively seizing. Lasting approximately 45 seconds. Patient placed on end tidal CO2. Ativan pulled from pyxis, but held in the event another seizure occurs.

## 2019-04-06 NOTE — ED NOTES
Intubation completed at this time. Positive color change, 23 at the teeth per Dr. Asya Thomas. Patient's breath sounds auscultated by Angelita Amin RN    Per Dr. Asya Thomas start propofol at 20 mcg/kg/min. Patient incontinent of urine. Preparing to place garcia as v/o.

## 2019-04-06 NOTE — ED NOTES
Pt was having witnessed seizrue, lasting approximately 2 minutes, MD went to bedside. This is 4th seizure since EMS arrival. 2 mg ordered for seizure instead of the original 1 mg. Pt rolled to left side, suctioned secretions. Keppra IV now complete. MD stated plan is to intubate and place on propofol drip.

## 2019-04-06 NOTE — ED NOTES
MD Asya Thomas to bedside and made aware of patients CO2 No further orders received at this time.  Will continue to monitor

## 2019-04-06 NOTE — ED NOTES
100mg of succinylcholine given at this time (00:60) by Faustina Wright RN as v/o by Dr. Rajesh Mina after being unable to intubate with use of only sedation propofol.

## 2019-04-06 NOTE — H&P
Hospitalist Admission Note    NAME: Adolfo Acuna   :  1985   MRN:  [id number]     Date/Time:  2019 1:29 PM    Patient PCP: Gayathri Asif MD  ________________________________________________________________________    Given the patient's current clinical presentation, I have a high level of concern for decompensation if discharged from the emergency department. Complex decision making was performed, which includes reviewing the patient's available past medical records, laboratory results, and x-ray films. My assessment of this patient's clinical condition and my plan of care is as follows. Assessment / Plan:  Status epilepticus requiring intubation   History of epilepsy  Admit to ICU, patient currently intubated and ventilated, goal Rass-1  Appreciate intensivist help while in the ICU  Continue with IV Keppra 1000 twice daily, PRN Ativan for seizures  FU  neuro recommendation, will need EEG  Neurochecks every 1 hour  CT brain wnl     Leucocytosis most likely reactive   Lactic acidosis most likely post seizure : lactic acid>16  No symptoms of infection, will monitor   UA neg , chest xray wnl     GI bleed ? Pt had an episode of bloody emesis while going to CT scan   Gastroccult +  H&H q8H , IV protonix 40mg bid   GI consulted , transfuse if HB <7  Hb 15.5    Diabetes mellitus type 2  Last HbA1c 5.7, not on any medication  Continue with sliding scale insulin    West syndrome  Mental retardation   Mom reported that patient at baseline is functional and independent for his daily activities             lives in a group home  NOK : Rea Kruse Parent [phone]       Body mass index is 29.48 kg/m². therefore classifying patient as obese, NOS (BMI of >30 kg/m2)    I have personally reviewed the radiographs, laboratory data in Epic and decisions and statements above are based partially on this personal interpretation.     Code Status: Full Code  DVT Prophylaxis: SCD's  GI Prophylaxis: PPI        Subjective:   CHIEF COMPLAINT: recurrent seizures     HISTORY OF PRESENT ILLNESS:     Miriam Gray is a 37 y.o.  female with known history as listed below presents to ED with complaint noted above. Available records were reviewed at the time of H&P. This is a 41-year-old female with past medical history of west  syndrome since childhood, epilepsy, mental retardation, scoliosis status post rods in her spine who was sent to the ED for recurrent seizures. Elyse Tena lives in a group home, most of the HPI obtained from mom at bedside and from the ED physician. Elyse Tena reportedly had 2 seizure-like activity in his group home, followed by another one in the ambulance and a fourth  while in the ED.  After her back to back seizures, patient remained postictal.  Patient usually takes Trileptal for epilepsy . Her  Trileptal has been decreased in  Last December from 600 mg twice daily to 300 mg twice daily by her neurologist as she has been seizure free for the  last 14 years. In the ED, patient was intubated and ventilated. Fallon Mendoza of her labs showed elevated white count 18 K, elevated lactic acidosis  Patient was given 1 g of Keppra in the ED . During transport to CT , pt vomited blood . gastroccult+  We were asked to see for work up and evaluation of the above problems. 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 No pertinent family history.      Allergies   Allergen Reactions    Bees [Sting, Bee] Anaphylaxis  Pertussis Vaccine,Adsorbed Other (comments)        Prior to Admission medications    Medication Sig Start Date End Date Taking? Authorizing Provider   norgestimate-ethinyl estradiol (TRINESSA, 28,) 0.18/0.215/0.25 mg-35 mcg (28) tab Take  by mouth. Yes Other, MD Nicolasa   loratadine (CLARITIN) 10 mg tablet Take 10 mg by mouth. Yes Delbert, MD Nicolasa   fluticasone propionate (FLONASE) 50 mcg/actuation nasal spray 2 Sprays by Both Nostrils route daily. Yes Other, MD Nicolasa   medroxyPROGESTERone (DEPO-PROVERA) 150 mg/mL injection 150 mg by IntraMUSCular route once. Yes Other, MD Nicolasa   OXcarbazepine (TRILEPTAL) 300 mg tablet Take 1 Tab by mouth two (2) times a day. 3/12/19  Yes Juan F Baeza MD     REVIEW OF SYSTEMS:  See HPI for details  Patient was not able to provide review of systems due to mental status change/acute illness      Objective:   VITALS:    Visit Vitals  /76   Pulse (!) 108   Temp (P) 97 °F (36.1 °C)   Resp 16   Ht 5' 2\" (1.575 m)   Wt 73.1 kg (161 lb 2.5 oz)   SpO2 99%   BMI 29.48 kg/m²       PHYSICAL EXAM:   General:    Intubated and ventilated  HEENT: Atraumatic, anicteric sclerae, pink conjunctivae     No oral ulcers, mucosa moist, throat clear  Neck:  Supple, symmetrical,  thyroid: non tender  Lungs:   B/l mechanical breath sounds ,  Wheezing or Rhonchi. No rales. Chest wall:  No tenderness  No Accessory muscle use. Heart:   Regular  rhythm,  No  murmur   No edema  Abdomen:   Soft, non-tender. Not distended. Bowel sounds normal  Extremities: No cyanosis. No clubbing  Skin:     Not pale.   Not Jaundiced  No rashes   Psych:  unabble to assess  Neurologic: Sedated RASS _-2_______________________________________________________________________  Care Plan discussed with:    Comments   Patient  Pt seen and examined    Family  x Mom at bedside    RN x    Care Manager                    Consultant:  x ED MD _______________________________________________________________________  Recommended Disposition:   Home with Family    HH/PT/OT/RN    SNF/LTC    PHILLIP    ________________________________________________________________________  TOTAL TIME: 65 Minutes    Critical Care Provided     Minutes non procedure based      Comments   >50% of visit spent in counseling and coordination of care x Chart review  Discussion with patient and/or family and questions answered     ________________________________________________________________________  Signed: Frederick Trevino MD    This note will not be viewable in [address] Procedures: see electronic medical records for all procedures/Xrays and details which were not copied into this note but were reviewed prior to creation of Plan.     LAB DATA REVIEWED:    Recent Results (from the past 24 hour(s))   EKG, 12 LEAD, INITIAL    Collection Time: 04/06/19  9:55 AM   Result Value Ref Range    Ventricular Rate 97 BPM    Atrial Rate 97 BPM    P-R Interval 170 ms    QRS Duration 90 ms    Q-T Interval 362 ms    QTC Calculation (Bezet) 459 ms    Calculated P Axis 39 degrees    Calculated R Axis -4 degrees    Calculated T Axis 27 degrees    Diagnosis       Normal sinus rhythm  Minimal voltage criteria for LVH, may be normal variant  No previous ECGs available     GLUCOSE, POC    Collection Time: 04/06/19 10:06 AM   Result Value Ref Range    Glucose (POC) 201 (H) 65 - 100 mg/dL    Performed by Abbi GARNETT WITH AUTOMATED DIFF    Collection Time: 04/06/19 10:17 AM   Result Value Ref Range    WBC 18.4 (H) 3.6 - 11.0 K/uL    RBC 5.30 (H) 3.80 - 5.20 M/uL    HGB 15.5 11.5 - 16.0 g/dL    HCT 49.1 (H) 35.0 - 47.0 %    MCV 92.6 80.0 - 99.0 FL    MCH 29.2 26.0 - 34.0 PG    MCHC 31.6 30.0 - 36.5 g/dL    RDW 12.7 11.5 - 14.5 %    PLATELET 777 (H) 924 - 400 K/uL    MPV 10.0 8.9 - 12.9 FL    NRBC 0.0 0  WBC    ABSOLUTE NRBC 0.00 0.00 - 0.01 K/uL    NEUTROPHILS 58 32 - 75 %    BAND NEUTROPHILS 4 %    LYMPHOCYTES 28 12 - 49 %    MONOCYTES 5 5 - 13 %    EOSINOPHILS 3 0 - 7 %    BASOPHILS 0 0 - 1 %    METAMYELOCYTES 1 %    MYELOCYTES 1 %    IMMATURE GRANULOCYTES 0 0.0 - 0.5 %    ABS. NEUTROPHILS 11.4 (H) 1.8 - 8.0 K/UL    ABS. LYMPHOCYTES 5.2 (H) 0.8 - 3.5 K/UL    ABS. MONOCYTES 0.9 0.0 - 1.0 K/UL    ABS. EOSINOPHILS 0.6 (H) 0.0 - 0.4 K/UL    ABS. BASOPHILS 0.0 0.0 - 0.1 K/UL    ABS. IMM. GRANS. 0.0 0.00 - 0.04 K/UL    DF MANUAL      RBC COMMENTS NORMOCYTIC, NORMOCHROMIC     METABOLIC PANEL, COMPREHENSIVE    Collection Time: 04/06/19 10:17 AM   Result Value Ref Range    Sodium 137 136 - 145 mmol/L    Potassium 3.6 3.5 - 5.1 mmol/L    Chloride 104 97 - 108 mmol/L    CO2 7 (LL) 21 - 32 mmol/L    Anion gap 26 (H) 5 - 15 mmol/L    Glucose 217 (H) 65 - 100 mg/dL    BUN 10 6 - 20 MG/DL    Creatinine 1.00 0.55 - 1.02 MG/DL    BUN/Creatinine ratio 10 (L) 12 - 20      GFR est AA >60 >60 ml/min/1.73m2    GFR est non-AA >60 >60 ml/min/1.73m2    Calcium 9.0 8.5 - 10.1 MG/DL    Bilirubin, total 0.2 0.2 - 1.0 MG/DL    ALT (SGPT) 18 12 - 78 U/L    AST (SGOT) 12 (L) 15 - 37 U/L    Alk. phosphatase 95 45 - 117 U/L    Protein, total 9.0 (H) 6.4 - 8.2 g/dL    Albumin 4.4 3.5 - 5.0 g/dL    Globulin 4.6 (H) 2.0 - 4.0 g/dL    A-G Ratio 1.0 (L) 1.1 - 2.2     SAMPLES BEING HELD    Collection Time: 04/06/19 10:17 AM   Result Value Ref Range    SAMPLES BEING HELD RED     COMMENT        Add-on orders for these samples will be processed based on acceptable specimen integrity and analyte stability, which may vary by analyte.    URINALYSIS W/ REFLEX CULTURE    Collection Time: 04/06/19 10:17 AM   Result Value Ref Range    Color YELLOW/STRAW      Appearance CLOUDY (A) CLEAR      Specific gravity 1.012 1.003 - 1.030      pH (UA) 5.0 5.0 - 8.0      Protein 100 (A) NEG mg/dL    Glucose NEGATIVE  NEG mg/dL    Ketone NEGATIVE  NEG mg/dL    Bilirubin NEGATIVE  NEG      Blood MODERATE (A) NEG      Urobilinogen 0.2 0.2 - 1.0 EU/dL Nitrites NEGATIVE  NEG      Leukocyte Esterase TRACE (A) NEG      WBC 0-4 0 - 4 /hpf    RBC 0-5 0 - 5 /hpf    Epithelial cells FEW FEW /lpf    Bacteria NEGATIVE  NEG /hpf    UA:UC IF INDICATED CULTURE NOT INDICATED BY UA RESULT CNI      Amorphous Crystals 1+ (A) NEG   DRUG SCREEN, URINE    Collection Time: 04/06/19 10:17 AM   Result Value Ref Range    AMPHETAMINES NEGATIVE  NEG      BARBITURATES NEGATIVE  NEG      BENZODIAZEPINES NEGATIVE  NEG      COCAINE NEGATIVE  NEG      METHADONE NEGATIVE  NEG      OPIATES NEGATIVE  NEG      PCP(PHENCYCLIDINE) NEGATIVE  NEG      THC (TH-CANNABINOL) NEGATIVE  NEG      Drug screen comment (NOTE)

## 2019-04-06 NOTE — ED PROVIDER NOTES
EMERGENCY DEPARTMENT HISTORY AND PHYSICAL EXAM      Date: 4/6/2019  Patient Name: Aurea Abreu    History of Presenting Illness     Chief Complaint   Patient presents with    Seizure       History Provided By: EMS and Caregiver    HPI: Aurea Abreu, 37 y.o. female with PMHx significant for [facility] syndrome, epilepsy, MR and chronic state encephalopath, presents via EMS to the ED with cc of several hours of seizure-like activity. She arrives via EMS after being found in the day room where she was conducting her daily chores at the group home where she lives. Her care provider tells me 1 of her housemates found her sitting on the floor and when they came into check on her she began to have seizure-like activity and foaming at the mouth. He was found in a seated position on the floor. Her care provider tells me there is been no change in her health over the last 48 hours. Been no changes to her medications in the past 24 hours. Followed by neurology and was last seen March 12, 2019. The medical records reflect a decrease in her Trileptal from 600 mg twice daily to 300 mg twice daily. Past surgical history is significant for scoliosis and she has rods in her spine. There are no other complaints, changes, or physical findings at this time. PCP: Deyanira Olson., MD    No current facility-administered medications on file prior to encounter. Current Outpatient Medications on File Prior to Encounter   Medication Sig Dispense Refill    norgestimate-ethinyl estradiol (TRINESSA, 28,) 0.18/0.215/0.25 mg-35 mcg (28) tab Take  by mouth.  loratadine (CLARITIN) 10 mg tablet Take 10 mg by mouth.  fluticasone propionate (FLONASE) 50 mcg/actuation nasal spray 2 Sprays by Both Nostrils route daily.  medroxyPROGESTERone (DEPO-PROVERA) 150 mg/mL injection 150 mg by IntraMUSCular route once.  OXcarbazepine (TRILEPTAL) 300 mg tablet Take 1 Tab by mouth two (2) times a day.  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 No pertinent family history. Social History:  Social History     Tobacco Use    Smoking status: Never Smoker    Smokeless tobacco: Never Used   Substance Use Topics    Alcohol use: No    Drug use: Not on file       Allergies: Allergies   Allergen Reactions    Bees [Sting, Bee] Anaphylaxis    Pertussis Vaccine,Adsorbed Other (comments)         Review of Systems   Review of Systems   Unable to perform ROS: Patient unresponsive       Physical Exam   Physical Exam   Constitutional: She appears lethargic. She appears ill. She is sedated. Nasal cannula in place. HENT:   Head: Normocephalic and atraumatic. Nose: Nose normal.   Mouth/Throat: Oropharynx is clear and moist.   Eyes: Pupils are equal, round, and reactive to light. Conjunctivae are normal.   Neck: Neck supple. Cardiovascular: Regular rhythm. Tachycardia present. Pulmonary/Chest: Effort normal and breath sounds normal. No accessory muscle usage. No respiratory distress. Abdominal: Soft. She exhibits no distension. There is no tenderness. Musculoskeletal:        Arms:  BACK:  Well healed vertical surgical scar  No bruising or redness    No obvious bruising, redness or swelling of any joint on palpation   Lymphadenopathy:     She has no cervical adenopathy. Neurological: She appears lethargic. Responsive to deep painful stimuli. Occasional purposeful movements of her upper extremities. Skin: Skin is warm and dry.    Nursing note and vitals reviewed. Diagnostic Study Results     Labs -     Recent Results (from the past 12 hour(s))   EKG, 12 LEAD, INITIAL    Collection Time: 04/06/19  9:55 AM   Result Value Ref Range    Ventricular Rate 97 BPM    Atrial Rate 97 BPM    P-R Interval 170 ms    QRS Duration 90 ms    Q-T Interval 362 ms    QTC Calculation (Bezet) 459 ms    Calculated P Axis 39 degrees    Calculated R Axis -4 degrees    Calculated T Axis 27 degrees    Diagnosis       Normal sinus rhythm  Minimal voltage criteria for LVH, may be normal variant  No previous ECGs available     GLUCOSE, POC    Collection Time: 04/06/19 10:06 AM   Result Value Ref Range    Glucose (POC) 201 (H) 65 - 100 mg/dL    Performed by Archana Nick    CBC WITH AUTOMATED DIFF    Collection Time: 04/06/19 10:17 AM   Result Value Ref Range    WBC 18.4 (H) 3.6 - 11.0 K/uL    RBC 5.30 (H) 3.80 - 5.20 M/uL    HGB 15.5 11.5 - 16.0 g/dL    HCT 49.1 (H) 35.0 - 47.0 %    MCV 92.6 80.0 - 99.0 FL    MCH 29.2 26.0 - 34.0 PG    MCHC 31.6 30.0 - 36.5 g/dL    RDW 12.7 11.5 - 14.5 %    PLATELET 780 (H) 777 - 400 K/uL    MPV 10.0 8.9 - 12.9 FL    NRBC 0.0 0  WBC    ABSOLUTE NRBC 0.00 0.00 - 0.01 K/uL    NEUTROPHILS 58 32 - 75 %    BAND NEUTROPHILS 4 %    LYMPHOCYTES 28 12 - 49 %    MONOCYTES 5 5 - 13 %    EOSINOPHILS 3 0 - 7 %    BASOPHILS 0 0 - 1 %    METAMYELOCYTES 1 %    MYELOCYTES 1 %    IMMATURE GRANULOCYTES 0 0.0 - 0.5 %    ABS. NEUTROPHILS 11.4 (H) 1.8 - 8.0 K/UL    ABS. LYMPHOCYTES 5.2 (H) 0.8 - 3.5 K/UL    ABS. MONOCYTES 0.9 0.0 - 1.0 K/UL    ABS. EOSINOPHILS 0.6 (H) 0.0 - 0.4 K/UL    ABS. BASOPHILS 0.0 0.0 - 0.1 K/UL    ABS. IMM.  GRANS. 0.0 0.00 - 0.04 K/UL    DF MANUAL      RBC COMMENTS NORMOCYTIC, NORMOCHROMIC     METABOLIC PANEL, COMPREHENSIVE    Collection Time: 04/06/19 10:17 AM   Result Value Ref Range    Sodium 137 136 - 145 mmol/L    Potassium 3.6 3.5 - 5.1 mmol/L    Chloride 104 97 - 108 mmol/L    CO2 7 (LL) 21 - 32 mmol/L    Anion gap 26 (H) 5 - 15 mmol/L Glucose 217 (H) 65 - 100 mg/dL    BUN 10 6 - 20 MG/DL    Creatinine 1.00 0.55 - 1.02 MG/DL    BUN/Creatinine ratio 10 (L) 12 - 20      GFR est AA >60 >60 ml/min/1.73m2    GFR est non-AA >60 >60 ml/min/1.73m2    Calcium 9.0 8.5 - 10.1 MG/DL    Bilirubin, total 0.2 0.2 - 1.0 MG/DL    ALT (SGPT) 18 12 - 78 U/L    AST (SGOT) 12 (L) 15 - 37 U/L    Alk. phosphatase 95 45 - 117 U/L    Protein, total 9.0 (H) 6.4 - 8.2 g/dL    Albumin 4.4 3.5 - 5.0 g/dL    Globulin 4.6 (H) 2.0 - 4.0 g/dL    A-G Ratio 1.0 (L) 1.1 - 2.2     SAMPLES BEING HELD    Collection Time: 04/06/19 10:17 AM   Result Value Ref Range    SAMPLES BEING HELD RED     COMMENT        Add-on orders for these samples will be processed based on acceptable specimen integrity and analyte stability, which may vary by analyte.    URINALYSIS W/ REFLEX CULTURE    Collection Time: 04/06/19 10:17 AM   Result Value Ref Range    Color YELLOW/STRAW      Appearance CLOUDY (A) CLEAR      Specific gravity 1.012 1.003 - 1.030      pH (UA) 5.0 5.0 - 8.0      Protein 100 (A) NEG mg/dL    Glucose NEGATIVE  NEG mg/dL    Ketone NEGATIVE  NEG mg/dL    Bilirubin NEGATIVE  NEG      Blood MODERATE (A) NEG      Urobilinogen 0.2 0.2 - 1.0 EU/dL    Nitrites NEGATIVE  NEG      Leukocyte Esterase TRACE (A) NEG      WBC 0-4 0 - 4 /hpf    RBC 0-5 0 - 5 /hpf    Epithelial cells FEW FEW /lpf    Bacteria NEGATIVE  NEG /hpf    UA:UC IF INDICATED CULTURE NOT INDICATED BY UA RESULT CNI      Amorphous Crystals 1+ (A) NEG   DRUG SCREEN, URINE    Collection Time: 04/06/19 10:17 AM   Result Value Ref Range    AMPHETAMINES NEGATIVE  NEG      BARBITURATES NEGATIVE  NEG      BENZODIAZEPINES NEGATIVE  NEG      COCAINE NEGATIVE  NEG      METHADONE NEGATIVE  NEG      OPIATES NEGATIVE  NEG      PCP(PHENCYCLIDINE) NEGATIVE  NEG      THC (TH-CANNABINOL) NEGATIVE  NEG      Drug screen comment (NOTE)        Radiologic Studies -   XR CHEST SNGL V   Final Result   IMPRESSION:    Low-lying ET tube, 11 mm above the jewell. May benefit from being retracted 2 to   3 cm. NG tube enters the stomach. No pneumothorax. XR CHEST PORT   Final Result   Impression:   1. No acute disease         CT HEAD WO CONT    (Results Pending)     CT Results  (Last 48 hours)    None        CXR Results  (Last 48 hours)               04/06/19 1212  XR CHEST SNGL V Final result    Impression:  IMPRESSION:    Low-lying ET tube, 11 mm above the jewell. May benefit from being retracted 2 to   3 cm. NG tube enters the stomach. No pneumothorax. Narrative:  INDICATION:    Verify placement of Endotracheal and oral/gastric tube. EXAMINATION:  AP CHEST, PORTABLE       COMPARISON: Earlier today       FINDINGS: Single AP portable view of the chest at 1159 hours demonstrates an ET   tube which is low-lying, approximately 11 mm above the jewell. Nasogastric tube   is seen to enter the stomach. Distal tip is not visualized. The   cardiomediastinal silhouette is stable. There is no new airspace disease. There   is no pneumothorax. Thoracal lumbar spine fusion hardware is noted. 04/06/19 1055  XR CHEST PORT Final result    Impression:  Impression:   1. No acute disease           Narrative:  INDICATION:  meets SIRS criteria        Exam: Portable chest 1043. Comparison: None. Findings: Cardiomediastinal silhouette is within normal limits. Pulmonary   vasculature is not engorged. There are no focal parenchymal opacities,   effusions, or pneumothorax. Patient is post thoracolumbar fusion. Medical Decision Making   I am the first provider for this patient. I reviewed the vital signs, available nursing notes, past medical history, past surgical history, family history and social history. Vital Signs-Reviewed the patient's vital signs.   Patient Vitals for the past 12 hrs:   Temp Pulse Resp BP SpO2   04/06/19 1201 (P) 97 °F (36.1 °C) (!) 120 19 111/76 97 %   04/06/19 1155 -- (!) 124 19 (!) 129/91 97 % 04/06/19 1114 -- -- -- -- 92 %   04/06/19 1100 97.2 °F (36.2 °C) (!) 117 27 127/73 93 %   04/06/19 1030 -- (!) 102 26 131/69 96 %   04/06/19 1020 -- -- -- -- 95 %   04/06/19 1001 95.4 °F (35.2 °C) 100 26 122/74 91 %       Pulse Oximetry Analysis -91 % on nasal cannula    Cardiac Monitor:   Rate: 100 bpm  Rhythm: Sinus Tachycardia      EKG interpretation: (Preliminary)  Rhythm: normal sinus rhythm; and regular . Rate (approx.): 97; Axis: normal; MD interval: normal; QRS interval: normal ; ST/T wave: normal    Records Reviewed: Nursing Notes, Old Medical Records and Ambulance Run Sheet    Provider Notes (Medical Decision Making): This patient with a history of epilepsy and West syndrome presents to the emergency department after having 2 seizures. She was found at her group home likely in a postictal state from a unwitnessed seizure, and then had an additional witnessed seizure with EMS. She has had 2 other seizures here in the emergency department for a total of 4 back-to-back seizures with no return to her baseline mental status in between. She just recently had her Trileptal decreased because she has not had a seizure since 2013. Due to impending respiratory failure and airway compromise from status epilepticus we did emergently intubate her and place her on a ventilator here in the emergency department. For seizures she was loaded with 1 g of Keppra IV. She was placed on a propofol drip for sedation which will also help control seizures. She does have a leukocytosis and tachycardia as well as some hypothermia however this may be all reactive to seizure activity. There is no report of fever or flulike illness prior to today. Neurology has been consulted. Will admit to the ICU under hospitalist care. CT of the head is still pending at the time of completion of this note. 10:43 AM  Sepsis protocol initiated. Patient noted to be having seizure like activity. 1mg Ativan is ordered IV.  1g Keppra is ordered. Case discussed with Dr. Lorena Barba. 11:07 AM  CO2 on chemistry is 7. Dr. Lorena Barba examines patient. Patient is ventilating with 2L nasal cannula. Will request respiratory place on a julius t mask at 40%. ED Course:   Initial assessment performed. The patients presenting problems have been discussed, and they are in agreement with the care plan formulated and outlined with them. I have encouraged them to ask questions as they arise throughout their visit. ED Course as of Apr 06 1250   Sat Apr 06, 2019   1153 Consultation with Dr. Martha Justice, neurologist.  Does not have any further recommendations at this time. Will admit to the ICU under hospitalist care.    [WM]      ED Course User Index  [WM] Roxanne Thompson MD      Procedure Note - Orotracheal Intubation:   11:48 AM  Performed by: Kimberly Nieves MD   Indication for procedure: impending respiratory failure and airway compromise  RSI performed. The patient was sedated with 70mg of propofol and orotracheally intubated with a 7.5 cuffed Romanian endotracheal tube using a 4 Glidescope blade with direct visualization. Tube was advanced to 23 cm at the teeth. ETT location confirmed by bilateral, symmetric breath sounds, good end-tidal CO2 detector color change  and no breath sounds over stomach. Number of attempts: 2  Complications: none  RSI was used. The procedure took 1-15 minutes, and pt tolerated well. Critical Care Time:   I have spent 45 minutes of critical care time in evaluating and treating this patient. This includes time spent at bedside, time with family and decision makers, documentation, review of labs and imaging, and/or consultation with specialists. It does not include time spent on separately billed procedures.      This patient presents with a critical illness or injury that acutely impairs one or more vital organ systems such that there is a high probability of imminent or life threatening deterioration in the patient's condition. This case involved decision making of high complexity to assess, manipulate, and support vital organ system failure and/or to prevent further life threatening deterioration of the patient's condition. Failure to initiate these interventions on an urgent basis would likely result in sudden, clinically significant or life threatening deterioration in the patient's condition. Abnormal findings supporting critical care: Status epilepticus, respiratory failure, impending airway compromise  Interventions to support critical care: IV benzodiazepines for seizure control, IV antiepileptics for seizure control, endotracheal intubation for airway protection  Failure to intervene may result in: Respiratory failure, cardiac failure, death, permanent neurological disability      Disposition:  Admit      Diagnosis     Clinical Impression:   1. Status epilepticus ([address]    2. West syndrome ([address]    3. Acute respiratory failure with hypoxia ([address]              This note will not be viewable in MyChart.

## 2019-04-06 NOTE — CONSULTS
NEUROLOGY NOTE     Chief Complaint   Patient presents with    Seizure       Reason for Consult  I have been asked by Karri Cantu MD to see the patient in neurological consultation to render advice and opinion regarding seizures    HPI  Shelly Yadav is a 37 y.o. female who presents to the hospital because of  seizures. 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 Patient was conducting her daily chores in the group home where she lives. She was found to be sitting on the floor when she was checked upon and she began to have seizure-like activity and foaming at the mouth. Patient has had a total of 4 seizures with no return to her baseline mental status. Recently her dosage of Trileptal was decreased from 600 mg p.o. twice daily to 300 mg p.o. twice daily as the patient was seizure-free for around 6 years. Patient has been intubated. Patient was loaded with 1 g of Keppra and is on a propofol drip. 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 No pertinent family history.       Social History     Socioeconomic History    Marital status: SINGLE     Spouse name: Not on file    Number of children: Not on file    Years of education: Not on file    Highest education level: Not on file   Tobacco Use    Smoking status: Never Smoker    Smokeless tobacco: Never Used   Substance and Sexual Activity    Alcohol use: No       ALLERGIES  Allergies   Allergen Reactions    Bees [Sting, Bee] Anaphylaxis  Pertussis Vaccine,Adsorbed Other (comments)       PHYSICAL EXAMINATION:   Patient Vitals for the past 24 hrs:   Temp Pulse Resp BP SpO2   04/06/19 1400 98.1 °F (36.7 °C) (!) 108 16 (!) 136/97 100 %   04/06/19 1321 -- (!) 108 16 -- 99 %   04/06/19 1201 97 °F (36.1 °C) (!) 120 19 111/76 97 %   04/06/19 1155 -- (!) 124 19 (!) 129/91 97 %   04/06/19 1114 -- -- -- -- 92 %   04/06/19 1100 97.2 °F (36.2 °C) (!) 117 27 127/73 93 %   04/06/19 1030 -- (!) 102 26 131/69 96 %   04/06/19 1020 -- -- -- -- 95 %   04/06/19 1001 95.4 °F (35.2 °C) 100 26 122/74 91 %        General:   General appearance: Pt is in no acute distress   Distal pulses are preserved  Fundoscopic exam: attempted    Neurological Examination:   Mental Status: Intubated. Sedated. Cranial Nerves: Visual fields are full. PERRL, Extraocular movements are full.      Motor: symmetric    Sensation: Normal to pain    LAB DATA REVIEWED:    Recent Results (from the past 24 hour(s))   EKG, 12 LEAD, INITIAL    Collection Time: 04/06/19  9:55 AM   Result Value Ref Range    Ventricular Rate 97 BPM    Atrial Rate 97 BPM    P-R Interval 170 ms    QRS Duration 90 ms    Q-T Interval 362 ms    QTC Calculation (Bezet) 459 ms    Calculated P Axis 39 degrees    Calculated R Axis -4 degrees    Calculated T Axis 27 degrees    Diagnosis       Normal sinus rhythm  Minimal voltage criteria for LVH, may be normal variant  No previous ECGs available     GLUCOSE, POC    Collection Time: 04/06/19 10:06 AM   Result Value Ref Range    Glucose (POC) 201 (H) 65 - 100 mg/dL    Performed by Gisselle Anca    POC LACTIC ACID    Collection Time: 04/06/19 10:16 AM   Result Value Ref Range    Lactic Acid (POC) >16.00 (HH) 0.40 - 2.00 mmol/L   CBC WITH AUTOMATED DIFF    Collection Time: 04/06/19 10:17 AM   Result Value Ref Range    WBC 18.4 (H) 3.6 - 11.0 K/uL    RBC 5.30 (H) 3.80 - 5.20 M/uL    HGB 15.5 11.5 - 16.0 g/dL    HCT 49.1 (H) 35.0 - 47.0 %    MCV 92.6 80.0 - 99.0 FL    MCH 29.2 26.0 - 34.0 PG    MCHC 31.6 30.0 - 36.5 g/dL    RDW 12.7 11.5 - 14.5 %    PLATELET 715 (H) 904 - 400 K/uL    MPV 10.0 8.9 - 12.9 FL    NRBC 0.0 0  WBC    ABSOLUTE NRBC 0.00 0.00 - 0.01 K/uL    NEUTROPHILS 58 32 - 75 %    BAND NEUTROPHILS 4 %    LYMPHOCYTES 28 12 - 49 %    MONOCYTES 5 5 - 13 %    EOSINOPHILS 3 0 - 7 %    BASOPHILS 0 0 - 1 %    METAMYELOCYTES 1 %    MYELOCYTES 1 %    IMMATURE GRANULOCYTES 0 0.0 - 0.5 %    ABS. NEUTROPHILS 11.4 (H) 1.8 - 8.0 K/UL    ABS. LYMPHOCYTES 5.2 (H) 0.8 - 3.5 K/UL    ABS. MONOCYTES 0.9 0.0 - 1.0 K/UL    ABS. EOSINOPHILS 0.6 (H) 0.0 - 0.4 K/UL    ABS. BASOPHILS 0.0 0.0 - 0.1 K/UL    ABS. IMM. GRANS. 0.0 0.00 - 0.04 K/UL    DF MANUAL      RBC COMMENTS NORMOCYTIC, NORMOCHROMIC     METABOLIC PANEL, COMPREHENSIVE    Collection Time: 04/06/19 10:17 AM   Result Value Ref Range    Sodium 137 136 - 145 mmol/L    Potassium 3.6 3.5 - 5.1 mmol/L    Chloride 104 97 - 108 mmol/L    CO2 7 (LL) 21 - 32 mmol/L    Anion gap 26 (H) 5 - 15 mmol/L    Glucose 217 (H) 65 - 100 mg/dL    BUN 10 6 - 20 MG/DL    Creatinine 1.00 0.55 - 1.02 MG/DL    BUN/Creatinine ratio 10 (L) 12 - 20      GFR est AA >60 >60 ml/min/1.73m2    GFR est non-AA >60 >60 ml/min/1.73m2    Calcium 9.0 8.5 - 10.1 MG/DL    Bilirubin, total 0.2 0.2 - 1.0 MG/DL    ALT (SGPT) 18 12 - 78 U/L    AST (SGOT) 12 (L) 15 - 37 U/L    Alk. phosphatase 95 45 - 117 U/L    Protein, total 9.0 (H) 6.4 - 8.2 g/dL    Albumin 4.4 3.5 - 5.0 g/dL    Globulin 4.6 (H) 2.0 - 4.0 g/dL    A-G Ratio 1.0 (L) 1.1 - 2.2     SAMPLES BEING HELD    Collection Time: 04/06/19 10:17 AM   Result Value Ref Range    SAMPLES BEING HELD RED     COMMENT        Add-on orders for these samples will be processed based on acceptable specimen integrity and analyte stability, which may vary by analyte.    URINALYSIS W/ REFLEX CULTURE    Collection Time: 04/06/19 10:17 AM   Result Value Ref Range    Color YELLOW/STRAW      Appearance CLOUDY (A) CLEAR      Specific gravity 1.012 1.003 - 1.030      pH (UA) 5.0 5.0 - 8.0      Protein 100 (A) NEG mg/dL    Glucose NEGATIVE  NEG mg/dL    Ketone NEGATIVE  NEG mg/dL    Bilirubin NEGATIVE  NEG      Blood MODERATE (A) NEG      Urobilinogen 0.2 0.2 - 1.0 EU/dL    Nitrites NEGATIVE  NEG      Leukocyte Esterase TRACE (A) NEG      WBC 0-4 0 - 4 /hpf    RBC 0-5 0 - 5 /hpf    Epithelial cells FEW FEW /lpf    Bacteria NEGATIVE  NEG /hpf    UA:UC IF INDICATED CULTURE NOT INDICATED BY UA RESULT CNI      Amorphous Crystals 1+ (A) NEG   DRUG SCREEN, URINE    Collection Time: 19 10:17 AM   Result Value Ref Range    AMPHETAMINES NEGATIVE  NEG      BARBITURATES NEGATIVE  NEG      BENZODIAZEPINES NEGATIVE  NEG      COCAINE NEGATIVE  NEG      METHADONE NEGATIVE  NEG      OPIATES NEGATIVE  NEG      PCP(PHENCYCLIDINE) NEGATIVE  NEG      THC (TH-CANNABINOL) NEGATIVE  NEG      Drug screen comment (NOTE)         Imaging review:  CT head  Normal    HOME MEDS  Prior to Admission Medications   Prescriptions Last Dose Informant Patient Reported? Taking? OXcarbazepine (TRILEPTAL) 300 mg tablet   No Yes   Sig: Take 1 Tab by mouth two (2) times a day. fluticasone propionate (FLONASE) 50 mcg/actuation nasal spray   Yes Yes   Si Sprays by Both Nostrils route daily. loratadine (CLARITIN) 10 mg tablet   Yes Yes   Sig: Take 10 mg by mouth.   medroxyPROGESTERone (DEPO-PROVERA) 150 mg/mL injection   Yes Yes   Si mg by IntraMUSCular route once. norgestimate-ethinyl estradiol (TRINESSA, 28,) 0.18/0.215/0.25 mg-35 mcg (28) tab   Yes Yes   Sig: Take  by mouth.       Facility-Administered Medications: None       CURRENT MEDS  Current Facility-Administered Medications   Medication Dose Route Frequency    sodium chloride 0.9 % bolus infusion 1,000 mL  1,000 mL IntraVENous ONCE    propofol (DIPRIVAN) infusion  0-50 mcg/kg/min IntraVENous TITRATE    famotidine (PF) (PEPCID) 20 mg in sodium chloride 0.9% 10 mL injection  20 mg IntraVENous Q12H    sodium chloride (NS) flush 5-40 mL  5-40 mL IntraVENous Q8H    [START ON 4/7/2019] levETIRAcetam (KEPPRA) 500 mg in 0.9% sodium chloride 100 mL IVPB  500 mg IntraVENous Q12H    insulin lispro (HUMALOG) injection   SubCUTAneous AC&HS    0.45% sodium chloride infusion  125 mL/hr IntraVENous CONTINUOUS       IMPRESSION:  Trudi Causey is a 37 y.o. female who presents with status epilepticus. Patient has history of West syndrome. Last seizure was in 2013. Patient is intubated now. Patient was taking Trileptal 300 mg p.o. twice daily which was recently decreased from 600 mg p.o. twice daily as the patient was seizure-free for around 6 years. We will increase her Keppra to 1 g IV twice daily. RECOMMENDATIONS:  1. Keppra 1 g IV twice daily  2. EEG  3.  Seizure precautions    Thank you very much for this consultation. 30 min cct provided.     Darlyn Dugan MD  Neurologist

## 2019-04-06 NOTE — ED NOTES
Patient presents to ED via EMS from a group home. Members of the home found her in the floor post-ictal. EMS was called and patient had a reported 1 minute seizure. Per EMS, patient had seizure like activity in route with decorticate posturing and rightward gaze. 2.5mg of versed given in route to ED which stopped the seizures. Patient currently has nasal trumpet in right nare placed by EMS. Patient currently sleeping. Last seizure approximately 10 years ago. Dr. Kyaw Fitch is her neurologist  Monitor x3, call bell within reach.

## 2019-04-06 NOTE — ED NOTES
Preparing patient to go to CT at this time and patient began to vomit what appears to be blood. Dr. Raleigh Marte made aware.

## 2019-04-07 LAB
ANION GAP SERPL CALC-SCNC: 9 MMOL/L (ref 5–15)
ARTERIAL PATENCY WRIST A: YES
BASE DEFICIT BLD-SCNC: 8 MMOL/L
BASOPHILS # BLD: 0 K/UL (ref 0–0.1)
BASOPHILS NFR BLD: 0 % (ref 0–1)
BDY SITE: ABNORMAL
BUN SERPL-MCNC: 5 MG/DL (ref 6–20)
BUN/CREAT SERPL: 10 (ref 12–20)
CALCIUM SERPL-MCNC: 7.7 MG/DL (ref 8.5–10.1)
CHLORIDE SERPL-SCNC: 115 MMOL/L (ref 97–108)
CO2 SERPL-SCNC: 17 MMOL/L (ref 21–32)
CREAT SERPL-MCNC: 0.51 MG/DL (ref 0.55–1.02)
DIFFERENTIAL METHOD BLD: ABNORMAL
EOSINOPHIL # BLD: 0 K/UL (ref 0–0.4)
EOSINOPHIL NFR BLD: 0 % (ref 0–7)
ERYTHROCYTE [DISTWIDTH] IN BLOOD BY AUTOMATED COUNT: 13.1 % (ref 11.5–14.5)
EST. AVERAGE GLUCOSE BLD GHB EST-MCNC: 117 MG/DL
GAS FLOW.O2 O2 DELIVERY SYS: ABNORMAL L/MIN
GAS FLOW.O2 SETTING OXYMISER: 16 BPM
GLUCOSE BLD STRIP.AUTO-MCNC: 100 MG/DL (ref 65–100)
GLUCOSE BLD STRIP.AUTO-MCNC: 117 MG/DL (ref 65–100)
GLUCOSE BLD STRIP.AUTO-MCNC: 150 MG/DL (ref 65–100)
GLUCOSE BLD STRIP.AUTO-MCNC: 79 MG/DL (ref 65–100)
GLUCOSE BLD STRIP.AUTO-MCNC: 80 MG/DL (ref 65–100)
GLUCOSE BLD STRIP.AUTO-MCNC: 96 MG/DL (ref 65–100)
GLUCOSE SERPL-MCNC: 93 MG/DL (ref 65–100)
HBA1C MFR BLD: 5.7 % (ref 4.2–6.3)
HCO3 BLD-SCNC: 16.1 MMOL/L (ref 22–26)
HCT VFR BLD AUTO: 34.1 % (ref 35–47)
HCT VFR BLD AUTO: 34.2 % (ref 35–47)
HGB BLD-MCNC: 11.5 G/DL (ref 11.5–16)
HGB BLD-MCNC: 11.7 G/DL (ref 11.5–16)
IMM GRANULOCYTES # BLD AUTO: 0.1 K/UL (ref 0–0.04)
IMM GRANULOCYTES NFR BLD AUTO: 0 % (ref 0–0.5)
INR PPP: 1.1 (ref 0.9–1.1)
LACTATE SERPL-SCNC: 0.7 MMOL/L (ref 0.4–2)
LYMPHOCYTES # BLD: 1.9 K/UL (ref 0.8–3.5)
LYMPHOCYTES NFR BLD: 13 % (ref 12–49)
MAGNESIUM SERPL-MCNC: 2.3 MG/DL (ref 1.6–2.4)
MCH RBC QN AUTO: 28.3 PG (ref 26–34)
MCHC RBC AUTO-ENTMCNC: 33.7 G/DL (ref 30–36.5)
MCV RBC AUTO: 84 FL (ref 80–99)
MONOCYTES # BLD: 1.2 K/UL (ref 0–1)
MONOCYTES NFR BLD: 8 % (ref 5–13)
NEUTS SEG # BLD: 11.6 K/UL (ref 1.8–8)
NEUTS SEG NFR BLD: 79 % (ref 32–75)
NRBC # BLD: 0 K/UL (ref 0–0.01)
NRBC BLD-RTO: 0 PER 100 WBC
O2/TOTAL GAS SETTING VFR VENT: 40 %
PCO2 BLD: 24.8 MMHG (ref 35–45)
PEEP RESPIRATORY: 6 CMH2O
PH BLD: 7.42 [PH] (ref 7.35–7.45)
PHOSPHATE SERPL-MCNC: 3.1 MG/DL (ref 2.6–4.7)
PLATELET # BLD AUTO: 304 K/UL (ref 150–400)
PMV BLD AUTO: 9.7 FL (ref 8.9–12.9)
PO2 BLD: 161 MMHG (ref 80–100)
POTASSIUM SERPL-SCNC: 3 MMOL/L (ref 3.5–5.1)
PROTHROMBIN TIME: 10.9 SEC (ref 9–11.1)
RBC # BLD AUTO: 4.06 M/UL (ref 3.8–5.2)
SAO2 % BLD: 100 % (ref 92–97)
SERVICE CMNT-IMP: ABNORMAL
SERVICE CMNT-IMP: ABNORMAL
SERVICE CMNT-IMP: NORMAL
SODIUM SERPL-SCNC: 141 MMOL/L (ref 136–145)
SPECIMEN TYPE: ABNORMAL
TOTAL RESP. RATE, ITRR: 16
VENTILATION MODE VENT: ABNORMAL
VT SETTING VENT: 500 ML
WBC # BLD AUTO: 14.8 K/UL (ref 3.6–11)

## 2019-04-07 PROCEDURE — 74011250636 HC RX REV CODE- 250/636: Performed by: PSYCHIATRY & NEUROLOGY

## 2019-04-07 PROCEDURE — 74011000250 HC RX REV CODE- 250: Performed by: INTERNAL MEDICINE

## 2019-04-07 PROCEDURE — 74011250636 HC RX REV CODE- 250/636: Performed by: INTERNAL MEDICINE

## 2019-04-07 PROCEDURE — 83735 ASSAY OF MAGNESIUM: CPT

## 2019-04-07 PROCEDURE — 80048 BASIC METABOLIC PNL TOTAL CA: CPT

## 2019-04-07 PROCEDURE — 83605 ASSAY OF LACTIC ACID: CPT

## 2019-04-07 PROCEDURE — 85610 PROTHROMBIN TIME: CPT

## 2019-04-07 PROCEDURE — 36600 WITHDRAWAL OF ARTERIAL BLOOD: CPT

## 2019-04-07 PROCEDURE — 36415 COLL VENOUS BLD VENIPUNCTURE: CPT

## 2019-04-07 PROCEDURE — 85025 COMPLETE CBC W/AUTO DIFF WBC: CPT

## 2019-04-07 PROCEDURE — 82803 BLOOD GASES ANY COMBINATION: CPT

## 2019-04-07 PROCEDURE — 83036 HEMOGLOBIN GLYCOSYLATED A1C: CPT

## 2019-04-07 PROCEDURE — 74011000258 HC RX REV CODE- 258: Performed by: INTERNAL MEDICINE

## 2019-04-07 PROCEDURE — C9113 INJ PANTOPRAZOLE SODIUM, VIA: HCPCS | Performed by: INTERNAL MEDICINE

## 2019-04-07 PROCEDURE — 65620000000 HC RM CCU GENERAL

## 2019-04-07 PROCEDURE — 84100 ASSAY OF PHOSPHORUS: CPT

## 2019-04-07 PROCEDURE — 82962 GLUCOSE BLOOD TEST: CPT

## 2019-04-07 PROCEDURE — 94003 VENT MGMT INPAT SUBQ DAY: CPT

## 2019-04-07 PROCEDURE — 85018 HEMOGLOBIN: CPT

## 2019-04-07 RX ORDER — HYDROMORPHONE HYDROCHLORIDE 1 MG/ML
0.5 INJECTION, SOLUTION INTRAMUSCULAR; INTRAVENOUS; SUBCUTANEOUS
Status: DISCONTINUED | OUTPATIENT
Start: 2019-04-07 | End: 2019-04-09

## 2019-04-07 RX ORDER — LEVETIRACETAM 10 MG/ML
1000 INJECTION INTRAVASCULAR EVERY 12 HOURS
Status: DISCONTINUED | OUTPATIENT
Start: 2019-04-07 | End: 2019-04-09

## 2019-04-07 RX ORDER — POTASSIUM CHLORIDE 29.8 MG/ML
20 INJECTION INTRAVENOUS ONCE
Status: COMPLETED | OUTPATIENT
Start: 2019-04-07 | End: 2019-04-07

## 2019-04-07 RX ORDER — MORPHINE SULFATE 2 MG/ML
2 INJECTION, SOLUTION INTRAMUSCULAR; INTRAVENOUS
Status: DISCONTINUED | OUTPATIENT
Start: 2019-04-07 | End: 2019-04-07

## 2019-04-07 RX ORDER — POTASSIUM CHLORIDE 29.8 MG/ML
20 INJECTION INTRAVENOUS
Status: DISPENSED | OUTPATIENT
Start: 2019-04-07 | End: 2019-04-07

## 2019-04-07 RX ADMIN — SODIUM CHLORIDE 125 ML/HR: 450 INJECTION, SOLUTION INTRAVENOUS at 12:09

## 2019-04-07 RX ADMIN — Medication 10 ML: at 14:48

## 2019-04-07 RX ADMIN — POTASSIUM CHLORIDE 20 MEQ: 400 INJECTION, SOLUTION INTRAVENOUS at 15:25

## 2019-04-07 RX ADMIN — SODIUM CHLORIDE 40 MG: 9 INJECTION, SOLUTION INTRAMUSCULAR; INTRAVENOUS; SUBCUTANEOUS at 20:48

## 2019-04-07 RX ADMIN — CHLORHEXIDINE GLUCONATE 15 ML: 1.2 RINSE ORAL at 08:26

## 2019-04-07 RX ADMIN — LEVETIRACETAM 1000 MG: 10 INJECTION INTRAVENOUS at 09:10

## 2019-04-07 RX ADMIN — PROPOFOL 30 MCG/KG/MIN: 10 INJECTION, EMULSION INTRAVENOUS at 08:23

## 2019-04-07 RX ADMIN — PROPOFOL 30 MCG/KG/MIN: 10 INJECTION, EMULSION INTRAVENOUS at 07:20

## 2019-04-07 RX ADMIN — CHLORHEXIDINE GLUCONATE 15 ML: 1.2 RINSE ORAL at 20:52

## 2019-04-07 RX ADMIN — PROPOFOL 50 MCG/KG/MIN: 10 INJECTION, EMULSION INTRAVENOUS at 01:43

## 2019-04-07 RX ADMIN — HYDROMORPHONE HYDROCHLORIDE 0.5 MG: 1 INJECTION, SOLUTION INTRAMUSCULAR; INTRAVENOUS; SUBCUTANEOUS at 18:04

## 2019-04-07 RX ADMIN — DEXTROSE MONOHYDRATE 25 ML: 500 INJECTION PARENTERAL at 23:59

## 2019-04-07 RX ADMIN — Medication 10 ML: at 19:39

## 2019-04-07 RX ADMIN — LEVETIRACETAM 1000 MG: 10 INJECTION INTRAVENOUS at 20:55

## 2019-04-07 RX ADMIN — MUPIROCIN: 20 OINTMENT TOPICAL at 20:52

## 2019-04-07 RX ADMIN — MORPHINE SULFATE 2 MG: 2 INJECTION, SOLUTION INTRAMUSCULAR; INTRAVENOUS at 14:47

## 2019-04-07 RX ADMIN — DEXTROSE MONOHYDRATE 25 ML: 500 INJECTION PARENTERAL at 19:36

## 2019-04-07 RX ADMIN — Medication 10 ML: at 09:00

## 2019-04-07 RX ADMIN — Medication 10 ML: at 22:06

## 2019-04-07 RX ADMIN — POTASSIUM CHLORIDE 20 MEQ: 400 INJECTION, SOLUTION INTRAVENOUS at 12:03

## 2019-04-07 RX ADMIN — SODIUM CHLORIDE 40 MG: 9 INJECTION, SOLUTION INTRAMUSCULAR; INTRAVENOUS; SUBCUTANEOUS at 08:59

## 2019-04-07 RX ADMIN — Medication 10 ML: at 06:08

## 2019-04-07 RX ADMIN — SODIUM CHLORIDE 125 ML/HR: 450 INJECTION, SOLUTION INTRAVENOUS at 03:10

## 2019-04-07 RX ADMIN — MUPIROCIN: 20 OINTMENT TOPICAL at 09:03

## 2019-04-07 RX ADMIN — PROPOFOL 50 MCG/KG/MIN: 10 INJECTION, EMULSION INTRAVENOUS at 19:05

## 2019-04-07 RX ADMIN — PROPOFOL 50 MCG/KG/MIN: 10 INJECTION, EMULSION INTRAVENOUS at 13:33

## 2019-04-07 NOTE — PROGRESS NOTES
2000 Becomes very agitated when talked to or touched. Propofol infusing at 50 mcg/kg/min. Will limit interactions as much as possible. 2335 Pulled garcia catheter out with her foot. 1500 [facility] garcia catheter inserted without problems. 0600 Propofol stopped for SAT.  0610 Passed SAT  0623 Failed SBT due to Increased RR.  0700 Bedside and Verbal shift change report given to Loyda Pereyra. Report included the following information SBAR, Kardex, ED Summary, Procedure Summary, Intake/Output, MAR, Recent Results and Cardiac Rhythm NSR.

## 2019-04-07 NOTE — PROGRESS NOTES
04/07/19 0621   ABCDEF Bundle   SBT Safety Screen Passed No   SBT Screen Reason for Failure Oxygen saturation < or equal to 88%; Increased inspiratory effort   Weaning Parameters   Spontaneous Breathing Trial Complete Yes   Resp Rate Observed 33   Ve 8.3      RSBI 128     SBT failed, patient back on AC mode.

## 2019-04-07 NOTE — PROGRESS NOTES
NEUROLOGY NOTE     Chief Complaint   Patient presents with    Seizure       SUBJECTIVE:  Increased GI secretions  More awake today. Follows commands  No seizures    HPI  Debi Apple is a 37 y.o. female who presents to the hospital because of  seizures. 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 Patient was conducting her daily chores in the group home where she lives. She was found to be sitting on the floor when she was checked upon and she began to have seizure-like activity and foaming at the mouth. Patient has had a total of 4 seizures with no return to her baseline mental status. Recently her dosage of Trileptal was decreased from 600 mg p.o. twice daily to 300 mg p.o. twice daily as the patient was seizure-free for around 6 years. Patient has been intubated. Patient was loaded with 1 g of Keppra and is on a propofol drip. 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 No pertinent family history.       Social History     Socioeconomic History    Marital status: SINGLE     Spouse name: Not on file    Number of children: Not on file    Years of education: Not on file    Highest education level: Not on file   Tobacco Use    Smoking status: Never Smoker    Smokeless tobacco: Never Used   Substance and Sexual Activity    Alcohol use: No       ALLERGIES  Allergies   Allergen Reactions    Bees [Sting, Bee] Anaphylaxis    Pertussis Vaccine,Adsorbed Other (comments)       PHYSICAL EXAMINATION:   Patient Vitals for the past 24 hrs:   Temp Pulse Resp BP SpO2   04/07/19 1106 -- 89 16 -- 99 %   04/07/19 1100 -- 92 16 121/71 100 %   04/07/19 1045 -- 98 16 -- 97 %   04/07/19 1030 -- 98 16 -- 97 %   04/07/19 1015 -- 96 16 -- 97 %   04/07/19 1000 -- 100 16 115/73 99 %   04/07/19 0945 -- 96 16 -- 99 %   04/07/19 0930 -- 93 16 -- 99 %   04/07/19 0915 -- 98 16 -- 100 %   04/07/19 0900 -- 100 16 118/71 99 %   04/07/19 0845 -- 90 16 -- 99 %   04/07/19 0830 -- 91 16 -- 97 %   04/07/19 0815 -- 79 16 -- 100 %   04/07/19 0800 -- 83 16 122/67 100 %   04/07/19 0745 -- 92 18 -- 100 %   04/07/19 0730 -- 93 16 -- 98 %   04/07/19 0726 -- 95 16 -- 99 %   04/07/19 0720 98.9 °F (37.2 °C) 93 16 122/73 99 %   04/07/19 0715 -- 98 17 -- 100 %   04/07/19 0700 -- 87 16 122/73 99 %   04/07/19 0615 -- 99 15 -- 100 %   04/07/19 0600 -- 82 16 117/65 98 %   04/07/19 0500 -- 88 16 111/65 99 %   04/07/19 0400 98.7 °F (37.1 °C) 85 16 110/59 98 %   04/07/19 0344 -- 87 16 -- 98 %   04/07/19 0300 -- 85 16 114/60 98 %   04/07/19 0200 -- 86 16 120/70 98 %   04/07/19 0100 -- 86 16 120/68 98 %   04/07/19 0000 98.4 °F (36.9 °C) 97 16 119/71 98 %   04/06/19 2351 98.4 °F (36.9 °C) -- -- -- --   04/06/19 2332 -- (!) 107 17 -- 100 %   04/06/19 2300 -- (!) 110 17 100/80 100 %   04/06/19 2200 -- 97 16 118/65 99 %   04/06/19 2100 -- 98 16 124/73 99 %   04/06/19 2000 97.7 °F (36.5 °C) 100 16 125/71 99 %   04/06/19 1904 -- (!) 101 16 -- 100 %   04/06/19 1900 -- (!) 109 16 120/71 100 %   04/06/19 1800 -- (!) 102 16 119/68 99 %   04/06/19 1700 -- 98 16 -- 99 %   04/06/19 1600 98.5 °F (36.9 °C) (!) 106 16 119/83 98 %   04/06/19 1549 -- (!) 112 17 122/77 --   04/06/19 1525 -- (!) 109 16 -- 98 %   04/06/19 1522 -- (!) 101 20 (!) 155/98 100 %   04/06/19 1505 -- (!) 102 16 (!) 152/98 100 %   04/06/19 1400 98.1 °F (36.7 °C) (!) 108 16 (!) 136/97 100 %   04/06/19 1321 -- (!) 108 16 -- 99 %        General:   General appearance: Pt is in no acute distress Distal pulses are preserved    Neurological Examination:   Mental Status: Alert and awake and follows commands. Cranial Nerves: Visual fields are full. PERRL, Extraocular movements are full. Motor: symmetric    Sensation: Normal to pain    LAB DATA REVIEWED:    Recent Results (from the past 24 hour(s))   POC G3 - PUL    Collection Time: 04/06/19  1:14 PM   Result Value Ref Range    FIO2 (POC) 100 %    pH (POC) 7.204 (LL) 7.35 - 7.45      pCO2 (POC) 44.7 35.0 - 45.0 MMHG    pO2 (POC) 421 (H) 80 - 100 MMHG    HCO3 (POC) 17.7 (L) 22 - 26 MMOL/L    sO2 (POC) 100 (H) 92 - 97 %    Base deficit (POC) 10 mmol/L    Site RIGHT RADIAL      Device: VENT      Mode ASSIST CONTROL      Tidal volume 500 ml    Set Rate 14 bpm    PEEP/CPAP (POC) 6 cmH2O    Allens test (POC) YES      Specimen type (POC) ARTERIAL      Total resp.  rate 16     OCCULT BLOOD, GASTRIC    Collection Time: 04/06/19  1:40 PM   Result Value Ref Range    OCCULT BLOOD,GASTRIC POSITIVE (A) NEG      pH,GASTRIC 4 (H) 1.5 - 3.5     CALCIUM    Collection Time: 04/06/19  2:27 PM   Result Value Ref Range    Calcium 7.8 (L) 8.5 - 10.1 MG/DL   MAGNESIUM    Collection Time: 04/06/19  2:27 PM   Result Value Ref Range    Magnesium 2.5 (H) 1.6 - 2.4 mg/dL   HGB & HCT    Collection Time: 04/06/19  2:27 PM   Result Value Ref Range    HGB 13.4 11.5 - 16.0 g/dL    HCT 39.5 35.0 - 47.0 %   LACTIC ACID    Collection Time: 04/06/19  2:30 PM   Result Value Ref Range    Lactic acid 2.1 (HH) 0.4 - 2.0 MMOL/L   GLUCOSE, POC    Collection Time: 04/06/19  5:37 PM   Result Value Ref Range    Glucose (POC) 93 65 - 100 mg/dL    Performed by [address] [place] [address], POC    Collection Time: 04/07/19 12:41 AM   Result Value Ref Range    Glucose (POC) 117 (H) 65 - 100 mg/dL    Performed by Lori Landrum    POC G3 - PUL    Collection Time: 04/07/19  4:08 AM   Result Value Ref Range    FIO2 (POC) 40 %    pH (POC) 7.420 7.35 - 7.45      pCO2 (POC) 24.8 (L) 35.0 - 45.0 MMHG    pO2 (POC) 161 (H) 80 - 100 MMHG    HCO3 (POC) 16.1 (L) 22 - 26 MMOL/L    sO2 (POC) 100 (H) 92 - 97 %    Base deficit (POC) 8 mmol/L    Site RIGHT RADIAL      Device: VENT      Mode ASSIST CONTROL      Tidal volume 500 ml    Set Rate 16 bpm    PEEP/CPAP (POC) 6 cmH2O    Allens test (POC) YES      Specimen type (POC) ARTERIAL      Total resp. rate 16     CBC WITH AUTOMATED DIFF    Collection Time: 04/07/19  4:13 AM   Result Value Ref Range    WBC 14.8 (H) 3.6 - 11.0 K/uL    RBC 4.06 3.80 - 5.20 M/uL    HGB 11.5 11.5 - 16.0 g/dL    HCT 34.1 (L) 35.0 - 47.0 %    MCV 84.0 80.0 - 99.0 FL    MCH 28.3 26.0 - 34.0 PG    MCHC 33.7 30.0 - 36.5 g/dL    RDW 13.1 11.5 - 14.5 %    PLATELET 824 456 - 888 K/uL    MPV 9.7 8.9 - 12.9 FL    NRBC 0.0 0  WBC    ABSOLUTE NRBC 0.00 0.00 - 0.01 K/uL    NEUTROPHILS 79 (H) 32 - 75 %    LYMPHOCYTES 13 12 - 49 %    MONOCYTES 8 5 - 13 %    EOSINOPHILS 0 0 - 7 %    BASOPHILS 0 0 - 1 %    IMMATURE GRANULOCYTES 0 0.0 - 0.5 %    ABS. NEUTROPHILS 11.6 (H) 1.8 - 8.0 K/UL    ABS. LYMPHOCYTES 1.9 0.8 - 3.5 K/UL    ABS. MONOCYTES 1.2 (H) 0.0 - 1.0 K/UL    ABS. EOSINOPHILS 0.0 0.0 - 0.4 K/UL    ABS. BASOPHILS 0.0 0.0 - 0.1 K/UL    ABS. IMM.  GRANS. 0.1 (H) 0.00 - 0.04 K/UL    DF AUTOMATED     METABOLIC PANEL, BASIC    Collection Time: 04/07/19  4:13 AM   Result Value Ref Range    Sodium 141 136 - 145 mmol/L    Potassium 3.0 (L) 3.5 - 5.1 mmol/L    Chloride 115 (H) 97 - 108 mmol/L    CO2 17 (L) 21 - 32 mmol/L    Anion gap 9 5 - 15 mmol/L    Glucose 93 65 - 100 mg/dL    BUN 5 (L) 6 - 20 MG/DL    Creatinine 0.51 (L) 0.55 - 1.02 MG/DL    BUN/Creatinine ratio 10 (L) 12 - 20      GFR est AA >60 >60 ml/min/1.73m2    GFR est non-AA >60 >60 ml/min/1.73m2    Calcium 7.7 (L) 8.5 - 10.1 MG/DL   MAGNESIUM    Collection Time: 04/07/19  4:13 AM   Result Value Ref Range    Magnesium 2.3 1.6 - 2.4 mg/dL   PHOSPHORUS    Collection Time: 04/07/19  4:13 AM   Result Value Ref Range    Phosphorus 3.1 2.6 - 4.7 MG/DL   LACTIC ACID Collection Time: 19  4:13 AM   Result Value Ref Range    Lactic acid 0.7 0.4 - 2.0 MMOL/L   HEMOGLOBIN A1C WITH EAG    Collection Time: 19  4:15 AM   Result Value Ref Range    Hemoglobin A1c 5.7 4.2 - 6.3 %    Est. average glucose 117 mg/dL   GLUCOSE, POC    Collection Time: 19  6:06 AM   Result Value Ref Range    Glucose (POC) 96 65 - 100 mg/dL    Performed by Chet Akins         Imaging review:  CT head  Normal    HOME MEDS  Prior to Admission Medications   Prescriptions Last Dose Informant Patient Reported? Taking? OXcarbazepine (TRILEPTAL) 300 mg tablet   No Yes   Sig: Take 1 Tab by mouth two (2) times a day. fluticasone propionate (FLONASE) 50 mcg/actuation nasal spray   Yes Yes   Si Sprays by Both Nostrils route daily. loratadine (CLARITIN) 10 mg tablet   Yes Yes   Sig: Take 10 mg by mouth.   medroxyPROGESTERone (DEPO-PROVERA) 150 mg/mL injection   Yes Yes   Si mg by IntraMUSCular route once. norgestimate-ethinyl estradiol (TRINESSA, 28,) 0.18/0.215/0.25 mg-35 mcg (28) tab   Yes Yes   Sig: Take  by mouth. Facility-Administered Medications: None       CURRENT MEDS  Current Facility-Administered Medications   Medication Dose Route Frequency    levETIRAcetam in saline (iso-os) (KEPPRA) infusion 1,000 mg  1,000 mg IntraVENous Q12H    potassium chloride 20 mEq in 50 ml IVPB  20 mEq IntraVENous Q1H    propofol (DIPRIVAN) infusion  0-50 mcg/kg/min IntraVENous TITRATE    sodium chloride (NS) flush 5-40 mL  5-40 mL IntraVENous Q8H    0.45% sodium chloride infusion  125 mL/hr IntraVENous CONTINUOUS    insulin lispro (HUMALOG) injection   SubCUTAneous Q6H    mupirocin (BACTROBAN) 2 % ointment   Both Nostrils Q12H    chlorhexidine (PERIDEX) 0.12 % mouthwash 15 mL  15 mL Oral Q12H    pantoprazole (PROTONIX) 40 mg in sodium chloride 0.9% 10 mL injection  40 mg IntraVENous Q12H       IMPRESSION:  Yoli Guillermo is a 37 y.o. female who presents with status epilepticus. Patient has history of West syndrome. Last seizure was in 2013. Patient is intubated now. Patient was taking Trileptal 300 mg p.o. twice daily which was recently decreased from 600 mg p.o. twice daily as the patient was seizure-free for around 6 years. Seizures clinically seems to have resolved. RECOMMENDATIONS:  1. Cont Keppra 1 g IV twice daily  2. EEG  3.  Seizure precautions        30 min cct provided.     Huang Coughlin MD  Neurologist

## 2019-04-07 NOTE — PROGRESS NOTES
1549- TRANSFER - IN REPORT:    Verbal report received from NOELLE Carrion RN(name) on [employer]  being received from ED(unit) for routine progression of care      Report consisted of patients Situation, Background, Assessment and   Recommendations(SBAR). Information from the following report(s) SBAR, Kardex, Intake/Output, Recent Results, Cardiac Rhythm Sinus tach and Alarm Parameters  was reviewed with the receiving nurse. Opportunity for questions and clarification was provided. Assessment completed upon patients arrival to unit and care assumed. Primary Nurse Remigio Hall RN and Ghazal Diaz RN performed a dual skin assessment on this patient No impairment noted  Zachariah score is 13    1600- Admission assessment completed; see flow sheet for details. Pt restless and agitated on admission to ICU; no spontaneous eye opening; spontaneous movements of both BUE and BLE; no command following; pulling at restraints; no easily to redirect; will titrate Propofol to effect. PERRLA. Currently in Sinus Tach; BP stable. Lungs are coarse; pt is vented; FiO2~40%; moderate amount of oral secretions; mouth care completed. ABD is semi-soft; BS hypoactive; OGT to intermittent suction; 55cm at the lip. Beltre catheter in place; adequate UOP. Skin is warm and dry; old scar to back. No indication of discomfort at this time. Pt repositioned. 1645- Pt's mother at the bedside; updated on pt's condition; stayed for a short minute at the bedside and stated she would be back in the AM.     1835- Propofol currently at 50mcg; pt resting when not stimulated; however once stimulated pt becomes restless and easily agitated; agitation resolves once pt is at rest; repositioned for comfort. Will monitor. 1945- Bedside and Verbal shift change report given to [address] (oncoming nurse) by Mark Nur RN (offgoing nurse).  Report included the following information SBAR, Kardex, Intake/Output, Recent Results, Cardiac Rhythm Sinus tach and Alarm Parameters .

## 2019-04-07 NOTE — PROGRESS NOTES
Progress Note      Pt Name  Da Santos   Date of Birth 1985   Medical Record Number  [id number]      Age  37 y.o. PCP Cirilo Valentine MD   Admit date:  4/6/2019    Room Number  2546/01  @ [facility]   Date of Service  4/7/2019     Admission Diagnoses:  Status epilepticus      Assessment and plan:       Status Epilepticus now intubated   History of Epilepsy  Appreciate input and guidance from Dr. Cornel Pizarro  Will continue Keppra as is      Leucocytosis most likely reactive   Lactic acidosis most likely post seizure : lactic acid>16  No symptoms of infection, will monitor   UA neg , chest xray wnl      GI bleed   Pt had an episode of bloody emesis while going to CT scan   Gastroccult   H&H q8H , IV protonix 40mg bid   Appreciate input from  GI - plan of EGD noted.    Hb 15.5    Initially suspected to have DM2   I see her HgBA1c is 5.7   Will leave the SSI as is and we might discontinue if she does not require any coverage     We will switch pain medication from Morphine to Dilaudid     West syndrome  Mental retardation                     CODE STATUS   Full     Functional Status  Pt resides in an adult group home apparently independent with ADLs     Surrogate decision maker:  Pt's mother      Prophylaxis   SCD's   Discharge Plan:  Group home ,     Misc   Benefit:  Payor: [employer] MEDICARE / Plan: [employer] MEDICARE CHOICE PPO/PFFS / Product Type: Managed Care Medicare /    Isolation :  There are currently no Active Isolations   ADT status:  INPATIENT      Query   None noted today    Prognosis      Social issues  Date  Comment     04/07/19  3:57 PM No family or visitor here today   I was told that her mother has been here all day and she had a chance to talk to other doctors                   Subjective Data     \"intubated \"  Review of Systems - History obtained from unobtainable from patient due to intubated status     Objective Data       Comments  Pt is intubated , awake      Patient Vitals for the past 24 hrs:   BP   04/07/19 1200 128/74   04/07/19 1100 121/71   04/07/19 1000 115/73   04/07/19 0900 118/71   04/07/19 0800 122/67   04/07/19 0720 122/73   04/07/19 0700 122/73   04/07/19 0600 117/65   04/07/19 0500 111/65   04/07/19 0400 110/59   04/07/19 0300 114/60   04/07/19 0200 120/70   04/07/19 0100 120/68   04/07/19 0000 119/71   04/06/19 2300 100/80   04/06/19 2200 118/65   04/06/19 2100 124/73   04/06/19 2000 125/71   04/06/19 1900 120/71   04/06/19 1800 119/68   04/06/19 1600 119/83   04/06/19 1549 122/77      Patient Vitals for the past 24 hrs:   Pulse   04/07/19 1525 84   04/07/19 1245 (!) 103   04/07/19 1230 97   04/07/19 1215 (!) 103   04/07/19 1200 92   04/07/19 1145 88   04/07/19 1130 87   04/07/19 1115 91   04/07/19 1106 89   04/07/19 1100 92   04/07/19 1045 98   04/07/19 1030 98   04/07/19 1015 96   04/07/19 1000 100   04/07/19 0945 96   04/07/19 0930 93   04/07/19 0915 98   04/07/19 0900 100   04/07/19 0845 90   04/07/19 0830 91   04/07/19 0815 79   04/07/19 0800 83   04/07/19 0745 92   04/07/19 0730 93   04/07/19 0726 95   04/07/19 0720 93   04/07/19 0715 98   04/07/19 0700 87   04/07/19 0615 99   04/07/19 0600 82   04/07/19 0500 88   04/07/19 0400 85   04/07/19 0344 87   04/07/19 0300 85   04/07/19 0200 86   04/07/19 0100 86   04/07/19 0000 97   04/06/19 2332 (!) 107   04/06/19 2300 (!) 110   04/06/19 2200 97   04/06/19 2100 98   04/06/19 2000 100   04/06/19 1904 (!) 101   04/06/19 1900 (!) 109   04/06/19 1800 (!) 102   04/06/19 1700 98   04/06/19 1600 (!) 106   04/06/19 1549 (!) 112      Patient Vitals for the past 24 hrs:   Resp   04/07/19 1525 16   04/07/19 1524 16   04/07/19 1447 16   04/07/19 1245 16   04/07/19 1230 16   04/07/19 1215 16   04/07/19 1200 16   04/07/19 1145 16   04/07/19 1130 16   04/07/19 1115 16   04/07/19 1106 16   04/07/19 1100 16   04/07/19 1045 16   04/07/19 1030 16   04/07/19 1015 16   04/07/19 1000 16   04/07/19 0945 16   04/07/19 0930 16   04/07/19 0915 16   04/07/19 0900 16   04/07/19 0845 16   04/07/19 0830 16   04/07/19 0815 16   04/07/19 0800 16   04/07/19 0745 18   04/07/19 0730 16   04/07/19 0726 16   04/07/19 0720 16   04/07/19 0715 17   04/07/19 0700 16   04/07/19 0615 15   04/07/19 0600 16   04/07/19 0500 16   04/07/19 0400 16   04/07/19 0344 16   04/07/19 0300 16   04/07/19 0200 16   04/07/19 0100 16   04/07/19 0000 16   04/06/19 2332 17   04/06/19 2300 17   04/06/19 2200 16   04/06/19 2100 16   04/06/19 2000 16   04/06/19 1904 16   04/06/19 1900 16   04/06/19 1800 16   04/06/19 1700 16   04/06/19 1600 16   04/06/19 1549 17      Patient Vitals for the past 24 hrs:   Temp   04/07/19 1534 98.8 °F (37.1 °C)   04/07/19 1240 98.8 °F (37.1 °C)   04/07/19 0720 98.9 °F (37.2 °C)   04/07/19 0400 98.7 °F (37.1 °C)   04/07/19 0000 98.4 °F (36.9 °C)   04/06/19 2351 98.4 °F (36.9 °C)   04/06/19 2000 97.7 °F (36.5 °C)   04/06/19 1600 98.5 °F (36.9 °C)        SpO2 Readings from Last 6 Encounters:   04/07/19 96%   03/12/19 98%   09/12/18 97%   08/21/18 98%   09/12/17 94%   03/14/17 98%       O2 Flow Rate (L/min): 2 l/min  O2 Device: Endotracheal tube, Ventilator Body mass index is 30.24 kg/m². -  Wt Readings from Last 10 Encounters:   04/06/19 75 kg (165 lb 5.5 oz)   03/12/19 75.3 kg (166 lb)   09/12/18 75.3 kg (166 lb)   08/21/18 75.3 kg (166 lb)   09/12/17 73.3 kg (161 lb 9.6 oz)   03/14/17 73.8 kg (162 lb 12.8 oz)   09/02/16 68.6 kg (151 lb 3.2 oz)   01/06/15 71.1 kg (156 lb 12.8 oz)   05/08/14 68.5 kg (151 lb)   10/08/11 62.5 kg (137 lb 12.6 oz)        Physical Exam:             General:  Alert, cooperative,   well noursished,   well developed,   appears stated age    Ears/Eyes:  Hearing intact  Sclera anicteric.    Pupils equal   Mouth/Throat:  Not examined    Neck:     Lungs:  Trachea midline  Chest excursion symmetrical   Auscultation B/L Symmetrical with   Vesicular breath sounds     No Crepitations noted          CVS:  Regular rate and rhythm   no  murmur, No click, rub or gallop  S1 normal   S2 normal   Pedal pulses  b/l symmetrical   Abdomen:    Soft, non-tender ? Bowel sounds normal  No distension      Extremities:    No cyanosis, jaundice  No edema noted   No sign of DVT/cord like lesion on palpation  No sign of acute trauma   Age appropriate OA changes noted. Skin:    Skin color, texture, turgor normal. no acute rash or lesions    Lymph nodes:     Musculoskeletal Muscle bulk B/L symmetrical   Neuro Face appears symmetrical       Psych:  Alert and awake             Medications reviewed     Current Facility-Administered Medications   Medication Dose Route Frequency    levETIRAcetam in saline (iso-os) (KEPPRA) infusion 1,000 mg  1,000 mg IntraVENous Q12H    morphine injection 2 mg  2 mg IntraVENous Q4H PRN    potassium chloride 20 mEq in 50 ml IVPB  20 mEq IntraVENous ONCE    sodium chloride (NS) flush 5-10 mL  5-10 mL IntraVENous PRN    propofol (DIPRIVAN) infusion  0-50 mcg/kg/min IntraVENous TITRATE    sodium chloride (NS) flush 5-40 mL  5-40 mL IntraVENous Q8H    sodium chloride (NS) flush 5-40 mL  5-40 mL IntraVENous PRN    LORazepam (ATIVAN) injection 1 mg  1 mg IntraVENous Q4H PRN    glucose chewable tablet 16 g  4 Tab Oral PRN    dextrose (D50W) injection syrg 12.5-25 g  12.5-25 g IntraVENous PRN    glucagon (GLUCAGEN) injection 1 mg  1 mg IntraMUSCular PRN    0.45% sodium chloride infusion  125 mL/hr IntraVENous CONTINUOUS    insulin lispro (HUMALOG) injection   SubCUTAneous Q6H    mupirocin (BACTROBAN) 2 % ointment   Both Nostrils Q12H    chlorhexidine (PERIDEX) 0.12 % mouthwash 15 mL  15 mL Oral Q12H    pantoprazole (PROTONIX) 40 mg in sodium chloride 0.9% 10 mL injection  40 mg IntraVENous Q12H       Relevant other informations:      Other medical conditions listed in [facility] problem list section; all of these and other pertinent data were taken into consideration when treatment plan is developed and customized to this patient's unique overall circumstances and needs. We have reviewed available old medical records within the constraints of this admission process. Data Review:   Recent Days:  All Micro Results     Procedure Component Value Units Date/Time    CULTURE, BLOOD, PAIRED [[id number]] Collected:  04/06/19 1017    Order Status:  Completed Specimen:  Blood Updated:  04/07/19 0712     Special Requests: NO SPECIAL REQUESTS        Culture result: NO GROWTH AFTER 20 HOURS             Recent Labs     04/07/19  0413 04/06/19  1427 04/06/19  1017   WBC 14.8*  --  18.4*   HGB 11.5 13.4 15.5   HCT 34.1* 39.5 49.1*     --  477*     Recent Labs     04/07/19  0413 04/06/19  1427 04/06/19  1017     --  137   K 3.0*  --  3.6   *  --  104   CO2 17*  --  7*   GLU 93  --  217*   BUN 5*  --  10   CREA 0.51*  --  1.00   CA 7.7* 7.8* 9.0   MG 2.3 2.5*  --    PHOS 3.1  --   --    ALB  --   --  4.4   TBILI  --   --  0.2   SGOT  --   --  12*   ALT  --   --  18      No results found for: TSH, TSHEXT         Care Plan discussed with:Patient/Family and Nurse   Other medical conditions are listed in the active hospital problem list section; these and other pertinent data were taken into consideration when the treatment plan was developed and customized to this patient's unique overall circumstances and needs. High complexity decision making was performed for this patient who is at high risk for decompensation with multiple organ involvement. Today total floor/unit time was 25 minutes while caring for this patient and greater than 50% of that time was spent with patient (and/or family) coordinating patients clinical issues; this includes time spent during multidisciplinary rounds.         Haley Sanchez MD MPH FACP    4/7/2019

## 2019-04-07 NOTE — PROGRESS NOTES
0720: Bedside shift change report given to Manjeet Cardenas RN (oncoming nurse) by Court Carrion RN (offgoing nurse). Report included the following information SBAR, Kardex, Intake/Output, MAR, Recent Results, Cardiac Rhythm Sinus rhythm and Alarm Parameters . Assumed care of patient at this time, reviewed chart, and completed full assessment at this time. Patient in bed with bed wheels locked, bed in low position, bed alarm set, and side rails up x3. Will continue to monitor closely. 1045: Patient's mother, Cinthia Duque, is at bedside talking loudly on the phone. Patient is coughing and biting her ETT. This RN requested for patient's mother to be as quiet as possible when at patient's bedside to help patient rest. Patient's mother stated \"I'm just waiting till the GI doctor comes by; then I'll leave! \" This RN reemphasized that patient's mother is welcome to stay at the bedside as long as she does not stimulate patient. Patient's mother left bedside. Will continue to monitor closely. 1315: Patient is grimacing, and patient nods when asked if she is in pain. This RN notified Dr. Jeff Vega, who gave a verbal order for morphine. Order received, read back, and entered into [facility]. Will continue to monitor closely. 1450: Clamped OG tube per Dr. Opal Segura. Will continue to monitor closely. 1931: Blood glucose 79. Repeated blood glucose was 80. Will continue to monitor closely. 1936: D50 administered per protocol for blood glucose of 80. Bedside shift change report given to Court Carrion RN (oncoming nurse) by Manjeet Cardenas RN (offgoing nurse). Report included the following information SBAR, Kardex, Intake/Output, MAR, Recent Results, Cardiac Rhythm Sinus rhythm and Alarm Parameters .

## 2019-04-08 ENCOUNTER — ANESTHESIA EVENT (OUTPATIENT)
Dept: ENDOSCOPY | Age: 34
DRG: 100 | End: 2019-04-08
Payer: MEDICARE

## 2019-04-08 ENCOUNTER — ANESTHESIA (OUTPATIENT)
Dept: ENDOSCOPY | Age: 34
DRG: 100 | End: 2019-04-08
Payer: MEDICARE

## 2019-04-08 VITALS
HEART RATE: 74 BPM | SYSTOLIC BLOOD PRESSURE: 132 MMHG | RESPIRATION RATE: 16 BRPM | OXYGEN SATURATION: 100 % | DIASTOLIC BLOOD PRESSURE: 77 MMHG

## 2019-04-08 LAB
ANION GAP SERPL CALC-SCNC: 10 MMOL/L (ref 5–15)
ARTERIAL PATENCY WRIST A: YES
ATRIAL RATE: 97 BPM
BASE DEFICIT BLD-SCNC: 9 MMOL/L
BASOPHILS # BLD: 0 K/UL (ref 0–0.1)
BASOPHILS NFR BLD: 0 % (ref 0–1)
BDY SITE: ABNORMAL
BUN SERPL-MCNC: 5 MG/DL (ref 6–20)
BUN/CREAT SERPL: 12 (ref 12–20)
CALCIUM SERPL-MCNC: 7.8 MG/DL (ref 8.5–10.1)
CALCULATED P AXIS, ECG09: 39 DEGREES
CALCULATED R AXIS, ECG10: -4 DEGREES
CALCULATED T AXIS, ECG11: 27 DEGREES
CHLORIDE SERPL-SCNC: 112 MMOL/L (ref 97–108)
CO2 SERPL-SCNC: 17 MMOL/L (ref 21–32)
CREAT SERPL-MCNC: 0.43 MG/DL (ref 0.55–1.02)
DIAGNOSIS, 93000: NORMAL
DIFFERENTIAL METHOD BLD: ABNORMAL
EOSINOPHIL # BLD: 0.1 K/UL (ref 0–0.4)
EOSINOPHIL NFR BLD: 1 % (ref 0–7)
ERYTHROCYTE [DISTWIDTH] IN BLOOD BY AUTOMATED COUNT: 13.5 % (ref 11.5–14.5)
GAS FLOW.O2 O2 DELIVERY SYS: ABNORMAL L/MIN
GAS FLOW.O2 SETTING OXYMISER: 16 BPM
GLUCOSE BLD STRIP.AUTO-MCNC: 109 MG/DL (ref 65–100)
GLUCOSE BLD STRIP.AUTO-MCNC: 113 MG/DL (ref 65–100)
GLUCOSE BLD STRIP.AUTO-MCNC: 121 MG/DL (ref 65–100)
GLUCOSE BLD STRIP.AUTO-MCNC: 123 MG/DL (ref 65–100)
GLUCOSE BLD STRIP.AUTO-MCNC: 130 MG/DL (ref 65–100)
GLUCOSE BLD STRIP.AUTO-MCNC: 76 MG/DL (ref 65–100)
GLUCOSE SERPL-MCNC: 105 MG/DL (ref 65–100)
H PYLORI FROM TISSUE: NEGATIVE
HCO3 BLD-SCNC: 15.8 MMOL/L (ref 22–26)
HCT VFR BLD AUTO: 31.8 % (ref 35–47)
HGB BLD-MCNC: 10.9 G/DL (ref 11.5–16)
IMM GRANULOCYTES # BLD AUTO: 0 K/UL (ref 0–0.04)
IMM GRANULOCYTES NFR BLD AUTO: 0 % (ref 0–0.5)
KIT LOT NO., HCLOLOT: NORMAL
LYMPHOCYTES # BLD: 1.8 K/UL (ref 0.8–3.5)
LYMPHOCYTES NFR BLD: 16 % (ref 12–49)
MAGNESIUM SERPL-MCNC: 2.1 MG/DL (ref 1.6–2.4)
MCH RBC QN AUTO: 28.7 PG (ref 26–34)
MCHC RBC AUTO-ENTMCNC: 34.3 G/DL (ref 30–36.5)
MCV RBC AUTO: 83.7 FL (ref 80–99)
MONOCYTES # BLD: 1 K/UL (ref 0–1)
MONOCYTES NFR BLD: 9 % (ref 5–13)
NEGATIVE CONTROL: NORMAL
NEUTS SEG # BLD: 8 K/UL (ref 1.8–8)
NEUTS SEG NFR BLD: 74 % (ref 32–75)
NRBC # BLD: 0 K/UL (ref 0–0.01)
NRBC BLD-RTO: 0 PER 100 WBC
O2/TOTAL GAS SETTING VFR VENT: 40 %
P-R INTERVAL, ECG05: 170 MS
PCO2 BLD: 25.2 MMHG (ref 35–45)
PEEP RESPIRATORY: 6 CMH2O
PH BLD: 7.41 [PH] (ref 7.35–7.45)
PHOSPHATE SERPL-MCNC: 3 MG/DL (ref 2.6–4.7)
PLATELET # BLD AUTO: 270 K/UL (ref 150–400)
PMV BLD AUTO: 9.5 FL (ref 8.9–12.9)
PO2 BLD: 171 MMHG (ref 80–100)
POSITIVE CONTROL: NORMAL
POTASSIUM SERPL-SCNC: 3 MMOL/L (ref 3.5–5.1)
Q-T INTERVAL, ECG07: 362 MS
QRS DURATION, ECG06: 90 MS
QTC CALCULATION (BEZET), ECG08: 459 MS
RBC # BLD AUTO: 3.8 M/UL (ref 3.8–5.2)
SAO2 % BLD: 100 % (ref 92–97)
SERVICE CMNT-IMP: ABNORMAL
SERVICE CMNT-IMP: NORMAL
SODIUM SERPL-SCNC: 139 MMOL/L (ref 136–145)
SPECIMEN TYPE: ABNORMAL
TOTAL RESP. RATE, ITRR: 16
VENTILATION MODE VENT: ABNORMAL
VENTRICULAR RATE, ECG03: 97 BPM
VT SETTING VENT: 500 ML
WBC # BLD AUTO: 10.9 K/UL (ref 3.6–11)

## 2019-04-08 PROCEDURE — 74011000250 HC RX REV CODE- 250: Performed by: INTERNAL MEDICINE

## 2019-04-08 PROCEDURE — 74011250636 HC RX REV CODE- 250/636

## 2019-04-08 PROCEDURE — 65620000000 HC RM CCU GENERAL

## 2019-04-08 PROCEDURE — 82962 GLUCOSE BLOOD TEST: CPT

## 2019-04-08 PROCEDURE — 76040000019: Performed by: SPECIALIST

## 2019-04-08 PROCEDURE — 94003 VENT MGMT INPAT SUBQ DAY: CPT

## 2019-04-08 PROCEDURE — 77030019988 HC FCPS ENDOSC DISP BSC -B: Performed by: SPECIALIST

## 2019-04-08 PROCEDURE — 74011250636 HC RX REV CODE- 250/636: Performed by: PSYCHIATRY & NEUROLOGY

## 2019-04-08 PROCEDURE — 74011250636 HC RX REV CODE- 250/636: Performed by: INTERNAL MEDICINE

## 2019-04-08 PROCEDURE — 77030020186 HC BOOT HL PROTCT SAGE -B

## 2019-04-08 PROCEDURE — 80048 BASIC METABOLIC PNL TOTAL CA: CPT

## 2019-04-08 PROCEDURE — 84100 ASSAY OF PHOSPHORUS: CPT

## 2019-04-08 PROCEDURE — 0DB68ZX EXCISION OF STOMACH, VIA NATURAL OR ARTIFICIAL OPENING ENDOSCOPIC, DIAGNOSTIC: ICD-10-PCS | Performed by: SPECIALIST

## 2019-04-08 PROCEDURE — 83735 ASSAY OF MAGNESIUM: CPT

## 2019-04-08 PROCEDURE — 74011000258 HC RX REV CODE- 258: Performed by: HOSPITALIST

## 2019-04-08 PROCEDURE — C9113 INJ PANTOPRAZOLE SODIUM, VIA: HCPCS | Performed by: INTERNAL MEDICINE

## 2019-04-08 PROCEDURE — 36600 WITHDRAWAL OF ARTERIAL BLOOD: CPT

## 2019-04-08 PROCEDURE — 82803 BLOOD GASES ANY COMBINATION: CPT

## 2019-04-08 PROCEDURE — 36415 COLL VENOUS BLD VENIPUNCTURE: CPT

## 2019-04-08 PROCEDURE — 85025 COMPLETE CBC W/AUTO DIFF WBC: CPT

## 2019-04-08 PROCEDURE — 76060000031 HC ANESTHESIA FIRST 0.5 HR: Performed by: SPECIALIST

## 2019-04-08 PROCEDURE — 87077 CULTURE AEROBIC IDENTIFY: CPT | Performed by: SPECIALIST

## 2019-04-08 RX ORDER — ONDANSETRON 2 MG/ML
4 INJECTION INTRAMUSCULAR; INTRAVENOUS
Status: DISCONTINUED | OUTPATIENT
Start: 2019-04-08 | End: 2019-04-10 | Stop reason: HOSPADM

## 2019-04-08 RX ORDER — DEXTROSE MONOHYDRATE AND SODIUM CHLORIDE 5; .45 G/100ML; G/100ML
50 INJECTION, SOLUTION INTRAVENOUS CONTINUOUS
Status: DISCONTINUED | OUTPATIENT
Start: 2019-04-08 | End: 2019-04-10 | Stop reason: HOSPADM

## 2019-04-08 RX ORDER — ONDANSETRON 2 MG/ML
INJECTION INTRAMUSCULAR; INTRAVENOUS
Status: COMPLETED
Start: 2019-04-08 | End: 2019-04-08

## 2019-04-08 RX ORDER — PROPOFOL 10 MG/ML
INJECTION, EMULSION INTRAVENOUS AS NEEDED
Status: DISCONTINUED | OUTPATIENT
Start: 2019-04-08 | End: 2019-04-08 | Stop reason: HOSPADM

## 2019-04-08 RX ORDER — SODIUM CHLORIDE 9 MG/ML
INJECTION, SOLUTION INTRAVENOUS
Status: DISCONTINUED | OUTPATIENT
Start: 2019-04-08 | End: 2019-04-08 | Stop reason: HOSPADM

## 2019-04-08 RX ORDER — LIDOCAINE HYDROCHLORIDE 20 MG/ML
INJECTION, SOLUTION EPIDURAL; INFILTRATION; INTRACAUDAL; PERINEURAL AS NEEDED
Status: DISCONTINUED | OUTPATIENT
Start: 2019-04-08 | End: 2019-04-08 | Stop reason: HOSPADM

## 2019-04-08 RX ORDER — POTASSIUM CHLORIDE 7.45 MG/ML
10 INJECTION INTRAVENOUS
Status: COMPLETED | OUTPATIENT
Start: 2019-04-08 | End: 2019-04-08

## 2019-04-08 RX ADMIN — DEXTROSE MONOHYDRATE AND SODIUM CHLORIDE 125 ML/HR: 5; .45 INJECTION, SOLUTION INTRAVENOUS at 09:03

## 2019-04-08 RX ADMIN — ONDANSETRON 4 MG: 2 INJECTION INTRAMUSCULAR; INTRAVENOUS at 07:39

## 2019-04-08 RX ADMIN — MUPIROCIN: 20 OINTMENT TOPICAL at 09:12

## 2019-04-08 RX ADMIN — POTASSIUM CHLORIDE 10 MEQ: 10 INJECTION, SOLUTION INTRAVENOUS at 16:30

## 2019-04-08 RX ADMIN — DEXTROSE MONOHYDRATE AND SODIUM CHLORIDE 125 ML/HR: 5; .45 INJECTION, SOLUTION INTRAVENOUS at 00:30

## 2019-04-08 RX ADMIN — HYDROMORPHONE HYDROCHLORIDE 0.5 MG: 1 INJECTION, SOLUTION INTRAMUSCULAR; INTRAVENOUS; SUBCUTANEOUS at 09:09

## 2019-04-08 RX ADMIN — DEXTROSE MONOHYDRATE AND SODIUM CHLORIDE 125 ML/HR: 5; .45 INJECTION, SOLUTION INTRAVENOUS at 22:41

## 2019-04-08 RX ADMIN — PROPOFOL 50 MCG/KG/MIN: 10 INJECTION, EMULSION INTRAVENOUS at 15:07

## 2019-04-08 RX ADMIN — POTASSIUM CHLORIDE 10 MEQ: 10 INJECTION, SOLUTION INTRAVENOUS at 15:06

## 2019-04-08 RX ADMIN — LEVETIRACETAM 1000 MG: 10 INJECTION INTRAVENOUS at 21:24

## 2019-04-08 RX ADMIN — LIDOCAINE HYDROCHLORIDE 100 MG: 20 INJECTION, SOLUTION EPIDURAL; INFILTRATION; INTRACAUDAL; PERINEURAL at 16:01

## 2019-04-08 RX ADMIN — Medication 10 ML: at 21:25

## 2019-04-08 RX ADMIN — PROPOFOL 50 MCG/KG/MIN: 10 INJECTION, EMULSION INTRAVENOUS at 00:06

## 2019-04-08 RX ADMIN — POTASSIUM CHLORIDE 10 MEQ: 10 INJECTION, SOLUTION INTRAVENOUS at 14:05

## 2019-04-08 RX ADMIN — PROPOFOL 20 MG: 10 INJECTION, EMULSION INTRAVENOUS at 16:10

## 2019-04-08 RX ADMIN — CHLORHEXIDINE GLUCONATE 15 ML: 1.2 RINSE ORAL at 09:12

## 2019-04-08 RX ADMIN — Medication 10 ML: at 09:09

## 2019-04-08 RX ADMIN — CHLORHEXIDINE GLUCONATE 15 ML: 1.2 RINSE ORAL at 22:05

## 2019-04-08 RX ADMIN — MUPIROCIN: 20 OINTMENT TOPICAL at 22:05

## 2019-04-08 RX ADMIN — PROPOFOL 50 MG: 10 INJECTION, EMULSION INTRAVENOUS at 16:01

## 2019-04-08 RX ADMIN — POTASSIUM CHLORIDE 10 MEQ: 10 INJECTION, SOLUTION INTRAVENOUS at 12:17

## 2019-04-08 RX ADMIN — PROPOFOL 50 MG: 10 INJECTION, EMULSION INTRAVENOUS at 16:04

## 2019-04-08 RX ADMIN — LEVETIRACETAM 1000 MG: 10 INJECTION INTRAVENOUS at 09:05

## 2019-04-08 RX ADMIN — Medication 10 ML: at 14:06

## 2019-04-08 RX ADMIN — SODIUM CHLORIDE 40 MG: 9 INJECTION, SOLUTION INTRAMUSCULAR; INTRAVENOUS; SUBCUTANEOUS at 21:24

## 2019-04-08 RX ADMIN — SODIUM CHLORIDE 40 MG: 9 INJECTION, SOLUTION INTRAMUSCULAR; INTRAVENOUS; SUBCUTANEOUS at 09:09

## 2019-04-08 RX ADMIN — HYDROMORPHONE HYDROCHLORIDE 0.5 MG: 1 INJECTION, SOLUTION INTRAMUSCULAR; INTRAVENOUS; SUBCUTANEOUS at 00:38

## 2019-04-08 RX ADMIN — SODIUM CHLORIDE: 9 INJECTION, SOLUTION INTRAVENOUS at 15:50

## 2019-04-08 RX ADMIN — Medication 10 ML: at 06:10

## 2019-04-08 RX ADMIN — PROPOFOL 50 MCG/KG/MIN: 10 INJECTION, EMULSION INTRAVENOUS at 09:36

## 2019-04-08 RX ADMIN — PROPOFOL 50 MCG/KG/MIN: 10 INJECTION, EMULSION INTRAVENOUS at 04:03

## 2019-04-08 NOTE — INTERDISCIPLINARY ROUNDS
Interdisciplinary team rounds were held 4/8/2019 with the following team members:Care Management, Diabetes Treatment Specialist, Nursing, Nutrition, Pharmacy, Physician and Clinical Coordinator. Plan of care discussed. See clinical pathway and/or care plan for interventions and desired outcomes.

## 2019-04-08 NOTE — PROCEDURES
Patient Name: Brayden Peguero  : 1985  Age: 37 y.o. Ordering physician: No ref. provider found  Date of EE2019  EEG procedure number: [id number]  Diagnosis: .seizure  Interpreting physician: Reynaldo Zambrano MD    ELECTROENCEPHALOGRAM REPORT     PROCEDURE: EEG. CLINICAL INDICATION: The patient is a 37 y.o. female with a history of possible seizures. EEG to rule out seizures, rule out stroke, rule out cortical abnormality. EEG CLASSIFICATION: Abnormal     DESCRIPTION OF THE RECORD:   The background of this recording contains a posteriorly-located occipital alpha rhythm of15 Hz that attenuates with eye opening. Excessive beta range activity seen. Hyperventilation was not performed. Photic stimulation produced no response. During the recording the patient did not achieve stage II sleep    INTERPRETATION:   Abnormal. Excessive beta activity seen secondary to medications. No ongoing seizures.        Reynaldo Zambrano MD  Neurologist

## 2019-04-08 NOTE — PROGRESS NOTES
Care Management:    Reason for Admission:   Epilepticus and now vented. EEG today. Hx DM, West Syndrome, Mental retardation. Mom is at bedside. She lives in a group home [address] [place] [address]. At baseline she is independent and works at [employer]. [address] [place] [address]  [phone]    She does not drive but has transportation service , home takes to doctors appointments and her mom helps as needed. Mom is Maricruz Escalante and she plans to take patient home with her for 4-5 days post hospital and than bring her back to her group home. Mom lives in [address] [place] [address]. Neurologist is Dr Kyaw Fitch and that is the MD patient sees most.                   RRAT Score:    4              Do you (patient/family) have any concerns for transition/discharge? Plans on her returning to group home after spending a few days at home with mom in [address] [place] [address]. Plan for utilizing home health:   Undetermined. Patient going to [address] [place] [address] most likely to stay with mom for 4-5 days post hospital.     Current Advanced Directive/Advance Care Plan:    Full Code  Likelihood of readmission?  low             Transition of Care Plan:      Home with mom and follow up with doctors. Care Management Interventions  PCP Verified by CM: Yes  Mode of Transport at Discharge: Other (see comment)(family)  Transition of Care Consult (CM Consult): Group Home(She lives at [address] [place] [address]. )  Current Support Network: Adult Group Home(She lives in a group home. She is independent in the home with her ADL's. She is able to work and she works at [employer]. )  Confirm Follow Up Transport: Family(Family, group home or transportation service. )  Plan discussed with Pt/Family/Caregiver: Yes(Met with her mom and her brother at bedside. )  Discharge Location  Discharge Placement: (Anticipate she will return to her group home post hospital..  )     Kirby Cordova [facility], [facility]–1983 ac [phone]

## 2019-04-08 NOTE — PROGRESS NOTES
Pharmacy Clarification of the Prior to Admission Medication Regimen Retrospective to the Admission Medication Reconciliation    The patient was not interviewed regarding clarification of the prior to admission medication regimen. Patient's brother was present in room and obtained permission from patient to discuss drug regimen with visitor(s) present. Patient's brother stated that patient's mother knew what patient took. Patient's brother called mother while MHT was in room and MHT was able to verify patients medications and last doses administered. Patient's mother was questioned regarding use of any other inhalers, topical products, over the counter medications, herbal medications, vitamin products or ophthalmic/nasal/otic medication use. Information Obtained From: Patient's mother    Recommendations/Findings: The following amendments were made to the patient's active medication list on file at [facility]:     1) Additions: None    2) Removals:   Flonase  Claritin  Depo-Provera  Trinessa    3) Changes: None    4) Pertinent Pharmacy Findings:  OXcarbazepine (TRILEPTAL) 300 mg tablet: Patient's mother stated during interview that patient used to take 600 mg BID but they recently decreased it to the dose below in December. PTA medication list was corrected to the following:     Prior to Admission Medications   Prescriptions Last Dose Informant Patient Reported? Taking? OXcarbazepine (TRILEPTAL) 300 mg tablet 4/6/2019 at Unknown time Parent No Yes   Sig: Take 1 Tab by mouth two (2) times a day.       Facility-Administered Medications: None          Thank you,  Mark Kuhn  Medication History Pharmacy Technician

## 2019-04-08 NOTE — ANESTHESIA POSTPROCEDURE EVALUATION
Procedure(s): ESOPHAGOGASTRODUODENOSCOPY (EGD) ESOPHAGOGASTRODUODENAL (EGD) BIOPSY. total IV anesthesia Anesthesia Post Evaluation Patient location during evaluation: PACU Note status: Adequate. Level of consciousness: responsive to verbal stimuli and sleepy but conscious Pain management: satisfactory to patient Airway patency: patent Anesthetic complications: no 
Cardiovascular status: acceptable Respiratory status: acceptable Hydration status: acceptable Comments: +Post-Anesthesia Evaluation and Assessment Patient: Da Santos MRN: [id number]  SSN: [id number] YOB: 1985  Age: 37 y.o. Sex: female Cardiovascular Function/Vital Signs /88   Pulse 75   Temp 36.8 °C (98.3 °F)   Resp 16   Ht 5' 2\" (1.575 m)   Wt 77 kg (169 lb 12.1 oz)   SpO2 100%   Breastfeeding? No   BMI 31.05 kg/m² Patient is status post Procedure(s): ESOPHAGOGASTRODUODENOSCOPY (EGD) ESOPHAGOGASTRODUODENAL (EGD) BIOPSY. Nausea/Vomiting: Controlled. Postoperative hydration reviewed and adequate. Pain: 
Pain Scale 1: Behavioral Pain Scale (BPS) (04/08/19 1542) Pain Intensity 1: 0 (04/08/19 1529) Managed. Neurological Status: At baseline. Mental Status and Level of Consciousness: Arousable. Pulmonary Status:  
O2 Device: Other (comment) (04/08/19 1619) Adequate oxygenation and airway patent. Complications related to anesthesia: None Post-anesthesia assessment completed. No concerns. Signed By: Delmy Rousseau DO  
 4/8/2019 Post anesthesia nausea and vomiting:  controlled Vitals Value Taken Time /88 4/8/2019  4:30 PM  
Temp Pulse 101 4/8/2019  4:38 PM  
Resp 41 4/8/2019  4:38 PM  
SpO2 100 % 4/8/2019  4:38 PM  
Vitals shown include unvalidated device data.

## 2019-04-08 NOTE — PROGRESS NOTES
Hospitalist Progress Note    NAME: Chas Medrano   :  1985   MRN:  [id number]       Assessment / Plan:  Status Epilepticus now intubated   History of Epilepsy  Appreciate input and guidance from Dr. Stuart Nelsonless  Will continue Rafaela Jorgito as is      Leucocytosis most likely reactive   Lactic acidosis most likely post seizure : lactic acid>16  No symptoms of infection, will monitor   UA neg , chest xray wnl      GI bleed   Pt had an episode of bloody emesis while going to CT scan   Hb stable,   Upper Endoscopy today      Initially suspected to have DM2    Hgb A1c is 5.7   Will leave the SSI as is and we might discontinue if she does not require any coverage      We will switch pain medication from Morphine to Dilaudid     West syndrome  Mental retardation            Subjective:     Chief Complaint / Reason for Physician Visit  None . Discussed with RN events overnight. Review of Systems:  Symptom Y/N Comments  Symptom Y/N Comments   Fever/Chills    Chest Pain     Poor Appetite    Edema     Cough    Abdominal Pain     Sputum    Joint Pain     SOB/BRITO    Pruritis/Rash     Nausea/vomit    Tolerating PT/OT     Diarrhea    Tolerating Diet     Constipation    Other       Could NOT obtain due to:      Objective:     VITALS:   Last 24hrs VS reviewed since prior progress note.  Most recent are:  Patient Vitals for the past 24 hrs:   Temp Pulse Resp BP SpO2   19 1415 -- 78 16 -- 100 %   19 1400 -- 62 16 141/80 100 %   19 1345 -- 61 16 -- 100 %   19 1330 -- 69 16 -- 100 %   19 1315 -- 84 18 -- 100 %   19 1300 -- 74 16 146/86 --   19 1245 -- 65 16 -- --   19 1230 -- 64 16 -- --   19 1215 -- 66 16 -- --   19 1205 -- 68 16 138/85 --   19 1200 -- 75 17 138/85 --   19 1145 -- 66 16 -- --   19 1138 -- 65 16 -- 99 %   19 1130 -- 63 16 -- --   19 1115 -- 69 16 -- --   19 1100 -- 67 16 143/84 --   19 1045 -- 66 16 -- --   19 1034 -- 84 18 -- 100 %   04/08/19 1030 -- 83 16 -- --   04/08/19 1015 -- 82 16 -- 100 %   04/08/19 1000 -- 74 16 137/88 100 %   04/08/19 0945 -- 60 16 -- 100 %   04/08/19 0930 -- 66 16 -- 100 %   04/08/19 0915 -- 69 16 -- 100 %   04/08/19 0900 -- 63 16 147/88 --   04/08/19 0845 -- 61 16 -- 100 %   04/08/19 0830 -- 64 16 -- 100 %   04/08/19 0815 -- 67 16 -- 100 %   04/08/19 0800 -- 85 24 137/83 100 %   04/08/19 0759 -- 84 17 -- 100 %   04/08/19 0745 -- (!) 101 -- -- 100 %   04/08/19 0731 98.1 °F (36.7 °C) 80 26 130/78 99 %   04/08/19 0730 -- 84 -- -- 99 %   04/08/19 0715 -- 83 -- -- 99 %   04/08/19 0700 -- 94 28 130/78 99 %   04/08/19 0634 -- (!) 29 14 -- 97 %   04/08/19 0628 -- 83 16 -- 98 %   04/08/19 0600 -- 70 16 127/70 100 %   04/08/19 0500 -- 84 16 122/65 95 %   04/08/19 0400 98.4 °F (36.9 °C) 79 16 118/63 100 %   04/08/19 0300 -- 73 16 118/63 98 %   04/08/19 0200 -- 78 16 108/60 98 %   04/08/19 0130 -- 82 18 -- 99 %   04/08/19 0100 -- 81 16 109/54 99 %   04/08/19 0042 -- 90 16 -- 99 %   04/08/19 0000 98.4 °F (36.9 °C) 81 16 115/58 100 %   04/07/19 2300 -- 73 16 109/58 100 %   04/07/19 2250 -- 75 16 -- 99 %   04/07/19 2200 -- 77 16 109/58 99 %   04/07/19 2100 -- 88 16 120/70 99 %   04/07/19 2044 -- 90 16 -- 99 %   04/07/19 2000 99.5 °F (37.5 °C) 92 16 114/62 100 %   04/07/19 1900 99.5 °F (37.5 °C) 87 16 112/61 99 %   04/07/19 1845 -- 88 16 -- 99 %   04/07/19 1830 -- 87 16 -- 99 %   04/07/19 1815 -- 88 16 -- 99 %   04/07/19 1800 -- 87 16 119/68 99 %   04/07/19 1745 -- 93 16 -- 99 %   04/07/19 1730 -- 90 16 -- 100 %   04/07/19 1715 -- 95 16 -- (!) 73 %   04/07/19 1700 -- 92 16 136/81 98 %   04/07/19 1645 -- 99 18 -- 98 %   04/07/19 1630 -- 96 16 -- 98 %   04/07/19 1615 -- 92 16 -- 97 %   04/07/19 1600 -- 93 16 116/68 97 %   04/07/19 1545 -- 92 17 -- 99 %   04/07/19 1534 98.8 °F (37.1 °C) 86 16 -- 96 %   04/07/19 1530 -- 85 16 -- 96 %   04/07/19 1525 -- 84 16 -- 96 %   04/07/19 1524 -- -- 16 -- --   04/07/19 1515 -- 89 16 -- --   04/07/19 1500 -- 89 16 118/65 --   04/07/19 1447 -- -- 16 -- --   04/07/19 1445 -- 91 16 -- --   04/07/19 1430 -- 91 16 -- --       Intake/Output Summary (Last 24 hours) at 4/8/2019 1427  Last data filed at 4/8/2019 1411  Gross per 24 hour   Intake 4499.74 ml   Output 2425 ml   Net 2074.74 ml        PHYSICAL EXAM:  General:  no acute distress    EENT:  EOMI. Anicteric sclerae. MMM  Resp:  CTA bilaterally, no wheezing or rales. No accessory muscle use  CV:  Regular  rhythm,  No edema  GI:  Soft, Non distended, Non tender.  +Bowel sounds  Neurologic:  Sedated   Psych:   . Not anxious nor agitated  Skin:  No rashes. No jaundice    Reviewed most current lab test results and cultures  YES  Reviewed most current radiology test results   YES  Review and summation of old records today    NO  Reviewed patient's current orders and MAR    YES  PMH/ reviewed - no change compared to H&P  ________________________________________________________________________  Care Plan discussed with:    Comments   Patient     Family      RN     Care Manager     Consultant                        Multidiciplinary team rounds were held today with , nursing, pharmacist and clinical coordinator. Patient's plan of care was discussed; medications were reviewed and discharge planning was addressed. ________________________________________________________________________  Total NON critical care TIME:  35  Minutes    Total CRITICAL CARE TIME Spent:   Minutes non procedure based      Comments   >50% of visit spent in counseling and coordination of care     ________________________________________________________________________  Amelia Negrete MD     Procedures: see electronic medical records for all procedures/Xrays and details which were not copied into this note but were reviewed prior to creation of Plan. LABS:  I reviewed today's most current labs and imaging studies.   Pertinent labs include:  Recent Labs 04/08/19 0454 04/07/19  1545 04/07/19  0413  04/06/19  1017   WBC 10.9  --  14.8*  --  18.4*   HGB 10.9* 11.7 11.5   < > 15.5   HCT 31.8* 34.2* 34.1*   < > 49.1*     --  304  --  477*    < > = values in this interval not displayed.      Recent Labs     04/08/19 0454 04/07/19  1545 04/07/19 0413 04/06/19  1427 04/06/19  1017     --  141  --  137   K 3.0*  --  3.0*  --  3.6   *  --  115*  --  104   CO2 17*  --  17*  --  7*   *  --  93  --  217*   BUN 5*  --  5*  --  10   CREA 0.43*  --  0.51*  --  1.00   CA 7.8*  --  7.7* 7.8* 9.0   MG 2.1  --  2.3 2.5*  --    PHOS 3.0  --  3.1  --   --    ALB  --   --   --   --  4.4   TBILI  --   --   --   --  0.2   SGOT  --   --   --   --  12*   ALT  --   --   --   --  18   INR  --  1.1  --   --   --        Signed: Zoradia Keita MD

## 2019-04-08 NOTE — PROGRESS NOTES
0720: Bedside shift change report given to Fani Barber RN (oncoming nurse) by David Duff RN (offgoing nurse). Report included the following information SBAR, Kardex, Intake/Output, MAR, Recent Results, Cardiac Rhythm Sinus rhythm and Alarm Parameters . Assumed care of patient at this time. Patient resting quietly in bed with bed wheels locked, bed in low position, bed alarm set, and side rails up x3. Propofol is currently off as patient is undergonig Will continue to monitor closely. 5397: Patient vomited a moderate amount of green emesis. This RN suctioned ETT and back of throat, administered zofran, and restarted patient's propofol infusion per Dr. Dory Dale, at bedside. Patient's ETT secretions were thick and tan-colored; no green aspirate was noted in the ETT. Will continue to monitor closely. 1618: Bedside EGD is complete. This RN paused propofol per Dr. Haley Calderon in preparation for spontaneous awakening/breathing trials. Will continue to monitor closely. 1749: Restraints were discontinued and patient was extubated by respiratory therapist. SpO2 is 100% on 3 liters FiO2 via nasal cannula. Will continue to monitor closely. 1900: Bedside shift change report given to David Duff RN (oncoming nurse) by Fani Barber RN (offgoing nurse). Report included the following information SBAR, Kardex, Intake/Output, MAR, Recent Results, Cardiac Rhythm Sinus rhythm and Alarm Parameters .

## 2019-04-08 NOTE — PROGRESS NOTES
Pt had endoscopy. NO overt bleeding. Was placed on SBT. Appears to be doing well. Following commands. Will proceed with extubation.

## 2019-04-08 NOTE — PROGRESS NOTES
Gastroenterology Daily Progress Note (Dr. Gilmore Code)   [address] [place] Date: 4/6/2019       Subjective:       Patient with greenish liquid in OG tube. Remains intubated. Current Facility-Administered Medications   Medication Dose Route Frequency    dextrose 5 % - 0.45% NaCl infusion  125 mL/hr IntraVENous CONTINUOUS    ondansetron (ZOFRAN) injection 4 mg  4 mg IntraVENous Q6H PRN    levETIRAcetam in saline (iso-os) (KEPPRA) infusion 1,000 mg  1,000 mg IntraVENous Q12H    HYDROmorphone (PF) (DILAUDID) injection 0.5 mg  0.5 mg IntraVENous Q4H PRN    sodium chloride (NS) flush 5-10 mL  5-10 mL IntraVENous PRN    propofol (DIPRIVAN) infusion  0-50 mcg/kg/min IntraVENous TITRATE    sodium chloride (NS) flush 5-40 mL  5-40 mL IntraVENous Q8H    sodium chloride (NS) flush 5-40 mL  5-40 mL IntraVENous PRN    LORazepam (ATIVAN) injection 1 mg  1 mg IntraVENous Q4H PRN    glucose chewable tablet 16 g  4 Tab Oral PRN    dextrose (D50W) injection syrg 12.5-25 g  12.5-25 g IntraVENous PRN    glucagon (GLUCAGEN) injection 1 mg  1 mg IntraMUSCular PRN    insulin lispro (HUMALOG) injection   SubCUTAneous Q6H    mupirocin (BACTROBAN) 2 % ointment   Both Nostrils Q12H    chlorhexidine (PERIDEX) 0.12 % mouthwash 15 mL  15 mL Oral Q12H    pantoprazole (PROTONIX) 40 mg in sodium chloride 0.9% 10 mL injection  40 mg IntraVENous Q12H        Objective:     Visit Vitals  /78   Pulse 84   Temp 98.1 °F (36.7 °C)   Resp 17   Ht 5' 2\" (1.575 m)   Wt 77 kg (169 lb 12.1 oz)   SpO2 100%   BMI 31.05 kg/m²   Blood pressure 130/78, pulse 84, temperature 98.1 °F (36.7 °C), resp. rate 17, height 5' 2\" (1.575 m), weight 77 kg (169 lb 12.1 oz), SpO2 100 %.     04/08 0701 - 04/08 1900  In: -   Out: 130 [Urine:130]    04/06 1901 - 04/08 0700  In: 5719.8 [I.V.:5609.8]  Out: 4805 [Urine:1780]      Intake/Output Summary (Last 24 hours) at 4/8/2019 0814  Last data filed at 4/8/2019 0731  Gross per 24 hour   Intake 3625.21 ml   Output 1740 ml   Net 1885.21 ml     Physical Exam:     General:  Sedated and intubated WF in NAD  Chest:  CTA, No rhonchi, rales or rubs.   Heart: S1, S2, RRR  GI: Soft, NT, ND + bowel sounds    Labs:       Recent Results (from the past 24 hour(s))   GLUCOSE, POC    Collection Time: 04/07/19 11:59 AM   Result Value Ref Range    Glucose (POC) 100 65 - 100 mg/dL    Performed by Autumn Lanes    HGB & HCT    Collection Time: 04/07/19  3:45 PM   Result Value Ref Range    HGB 11.7 11.5 - 16.0 g/dL    HCT 34.2 (L) 35.0 - 47.0 %   PROTHROMBIN TIME + INR    Collection Time: 04/07/19  3:45 PM   Result Value Ref Range    INR 1.1 0.9 - 1.1      Prothrombin time 10.9 9.0 - 11.1 sec   GLUCOSE, POC    Collection Time: 04/07/19  7:30 PM   Result Value Ref Range    Glucose (POC) 79 65 - 100 mg/dL    Performed by Autumn Lanes    GLUCOSE, POC    Collection Time: 04/07/19  7:31 PM   Result Value Ref Range    Glucose (POC) 80 65 - 100 mg/dL    Performed by Autumn Lanes    GLUCOSE, POC    Collection Time: 04/07/19  7:51 PM   Result Value Ref Range    Glucose (POC) 150 (H) 65 - 100 mg/dL    Performed by Rudy Wilkins    GLUCOSE, POC    Collection Time: 04/07/19 11:58 PM   Result Value Ref Range    Glucose (POC) 76 65 - 100 mg/dL    Performed by Rudy Wilkins    GLUCOSE, POC    Collection Time: 04/08/19 12:32 AM   Result Value Ref Range    Glucose (POC) 123 (H) 65 - 100 mg/dL    Performed by Rudy Wilkins    CBC WITH AUTOMATED DIFF    Collection Time: 04/08/19  4:54 AM   Result Value Ref Range    WBC 10.9 3.6 - 11.0 K/uL    RBC 3.80 3.80 - 5.20 M/uL    HGB 10.9 (L) 11.5 - 16.0 g/dL    HCT 31.8 (L) 35.0 - 47.0 %    MCV 83.7 80.0 - 99.0 FL    MCH 28.7 26.0 - 34.0 PG    MCHC 34.3 30.0 - 36.5 g/dL    RDW 13.5 11.5 - 14.5 %    PLATELET 255 379 - 989 K/uL    MPV 9.5 8.9 - 12.9 FL    NRBC 0.0 0  WBC    ABSOLUTE NRBC 0.00 0.00 - 0.01 K/uL    NEUTROPHILS 74 32 - 75 %    LYMPHOCYTES 16 12 - 49 % MONOCYTES 9 5 - 13 %    EOSINOPHILS 1 0 - 7 %    BASOPHILS 0 0 - 1 %    IMMATURE GRANULOCYTES 0 0.0 - 0.5 %    ABS. NEUTROPHILS 8.0 1.8 - 8.0 K/UL    ABS. LYMPHOCYTES 1.8 0.8 - 3.5 K/UL    ABS. MONOCYTES 1.0 0.0 - 1.0 K/UL    ABS. EOSINOPHILS 0.1 0.0 - 0.4 K/UL    ABS. BASOPHILS 0.0 0.0 - 0.1 K/UL    ABS. IMM. GRANS. 0.0 0.00 - 0.04 K/UL    DF AUTOMATED     METABOLIC PANEL, BASIC    Collection Time: 04/08/19  4:54 AM   Result Value Ref Range    Sodium 139 136 - 145 mmol/L    Potassium 3.0 (L) 3.5 - 5.1 mmol/L    Chloride 112 (H) 97 - 108 mmol/L    CO2 17 (L) 21 - 32 mmol/L    Anion gap 10 5 - 15 mmol/L    Glucose 105 (H) 65 - 100 mg/dL    BUN 5 (L) 6 - 20 MG/DL    Creatinine 0.43 (L) 0.55 - 1.02 MG/DL    BUN/Creatinine ratio 12 12 - 20      GFR est AA >60 >60 ml/min/1.73m2    GFR est non-AA >60 >60 ml/min/1.73m2    Calcium 7.8 (L) 8.5 - 10.1 MG/DL   PHOSPHORUS    Collection Time: 04/08/19  4:54 AM   Result Value Ref Range    Phosphorus 3.0 2.6 - 4.7 MG/DL   MAGNESIUM    Collection Time: 04/08/19  4:54 AM   Result Value Ref Range    Magnesium 2.1 1.6 - 2.4 mg/dL   POC G3 - PUL    Collection Time: 04/08/19  4:54 AM   Result Value Ref Range    FIO2 (POC) 40 %    pH (POC) 7.405 7.35 - 7.45      pCO2 (POC) 25.2 (L) 35.0 - 45.0 MMHG    pO2 (POC) 171 (H) 80 - 100 MMHG    HCO3 (POC) 15.8 (L) 22 - 26 MMOL/L    sO2 (POC) 100 (H) 92 - 97 %    Base deficit (POC) 9 mmol/L    Site LEFT RADIAL      Device: VENT      Mode ASSIST CONTROL      Tidal volume 500 ml    Set Rate 16 bpm    PEEP/CPAP (POC) 6 cmH2O    Allens test (POC) YES      Specimen type (POC) ARTERIAL      Total resp.  rate 16     GLUCOSE, POC    Collection Time: 04/08/19  6:07 AM   Result Value Ref Range    Glucose (POC) 109 (H) 65 - 100 mg/dL    Performed by Robbin MARIO(wbc:2,hgb:2,hct:2,plt:2,)  Recent Labs     04/08/19  0454 04/07/19  0413 04/06/19  1427 04/06/19  1017    141  --  137   K 3.0* 3.0*  --  3.6   * 115*  --  104   CO2 17* 17*  -- 7*   BUN 5* 5*  --  10   CREA 0.43* 0.51*  --  1.00   * 93  --  217*   CA 7.8* 7.7* 7.8* 9.0   MG 2.1 2.3 2.5*  --    PHOS 3.0 3.1  --   --      Recent Labs     04/07/19  1545   INR 1.1   PTP 10.9     Recent Labs     04/06/19  1017   SGOT 12*   AP 95   TP 9.0*   ALB 4.4   GLOB 4.6*         Impression:    Status epilepticus  UGI bleeding  Acute GI blood loss anemia  Mental retardation         Plan:  Patient with a further decrease in Hgb to 10.9 today.   She will need to have EGD during hospitalization to r/o bleeding from esophagus: MW tear, esophagitis vs. Tongue bleeding from seizure.    -keep on IV PPI  -plan for now for EGD later today with Dr. Agatha Fuchs or Rosalia Blancas (consent obtained from mother)       Marina Boas, MD    4/8/2019  [address], [address] [place] [address]  [address] [id number] [address] [address] [place]: [phone]

## 2019-04-08 NOTE — ANESTHESIA PREPROCEDURE EVALUATION
Relevant Problems No relevant active problems Anesthetic History PONV Review of Systems / Medical History Patient summary reviewed, nursing notes reviewed and pertinent labs reviewed Pulmonary Within defined limits Neuro/Psych  
 
seizures Cardiovascular Within defined limits Exercise tolerance: >4 METS 
  
GI/Hepatic/Renal 
Within defined limits Endo/Other Anemia Other Findings Comments: Mild hypokalemia Status epilepticus Physical Exam 
 
Airway Cardiovascular Regular rate and rhythm,  S1 and S2 normal,  no murmur, click, rub, or gallop Dental 
 
 
  
Pulmonary Breath sounds clear to auscultation Abdominal 
GI exam deferred Other Findings Anesthetic Plan ASA: 3 Anesthesia type: general 
 
 
 
 
Induction: Intravenous Anesthetic plan and risks discussed with: Patient

## 2019-04-08 NOTE — PROGRESS NOTES
2055 OG tube unclamped. 2130 OG tube re-clamped. 75 ml of green liquid drained into cannister. 2145 Patient's mother called to review consent for EGD. Unable to reach mother. Message left on answering machine to call the hospital regarding consent. 2359 Finger stick blood sugar 76. D50 1/2 amp given. 0030 1/2 NS stopped and D5 1/2 NS hung at 125 ml/hr due to low blood sugars. 0032 Repeat Blood sugar 125  0330 OG tube unclamped for 30 minutes as ordered. !00 ml of green liquid return. 0400 OG tube re-clamped  0622 Propofol stopped for SAT  0630 SAT passed  0700 Bedside and Verbal shift change report given to [employer]. Report included the following information SBAR, Kardex, Procedure Summary, Intake/Output, MAR, Recent Results and Cardiac Rhythm NSR.

## 2019-04-08 NOTE — PROCEDURES
[id number]    Esophagogastroduodenoscopy    Indications:  Melena  Blood loss anemia    Medications:  See anesthesia form    Post procedure diagnosis:  gastritis    Description of Procedure:    Prior to the procedure its objectives, risks, consequences and alternatives were discussed with the patient's mother who then elected to proceed. The Olympus video endoscope was inserted under direct vision into the mouth and then into the esophagus. I removed the orogastric tube. The esophagus looked normal.    The z-line was located at 40 cm. There were linear erosions and ulcer along the greater curve. By location and appearance these aer related to og tube trauma. There were no other diagnostic abnormalities of the body, fundus, antrum, cardia and incisura of the stomach. This included direct and retroflexion examination. The first and second portion of the duodenum appeared normal.    I took gastric biopsies for h pylori by rapid testing   I attempted to place a 16Fr ng tube. I was unable to get it past the back of the nose on either side. I tried multiple times. Complications: There were no apparent complications and the patient tolerated the procedure well.         Estimated Blood Loss:  Less than 15 ml (after ng tube attempt)  Specimens Removed:  Stomach hp rapid  Impressions:  Orogastric tube ulcers and erosions  Unable to place ng tube      Signed By: Mejia Larkin MD                        April 8, 2019     4:18 PM

## 2019-04-08 NOTE — PROGRESS NOTES
See op note  Low risk of re bleeding  Rec:  Check hp rapid tests  Continue ppi  Intermittent suction or no ng tube in the future  Dr. Dameon Encarnacion to follow    Jorden Álvarez MD  4:22 PM  4/8/2019

## 2019-04-08 NOTE — CONSULTS
[facility]    Name:  Yas Vasquez  MR#:  [id number]  :  1985  ACCOUNT #:  [de-identified]  DATE OF SERVICE:  2019      CHIEF COMPLAINT:  Blood and bile from NG tube, status post grand mal seizure. HISTORY OF PRESENT ILLNESS:  A 66-year-old patient with a history of epilepsy and mental retardation, who was admitted to the ER in grand mal seizure, intubated to protect the airway, and on transfer to CT scan, had an episode of retching and bloody vomitus. NG tube was placed subsequent to that and then she has continued to have bloody drainage. The hemoglobin when she was admitted was 15 g with a BUN of 10 and is now down the following day to 11.5. The BUN and creatinine have improved. The patient has had no stools here in the hospital.  The patient is currently awake, can furnish no information as she is still intubated, and it is unclear whether or not she is really following commands while examining her. I spoke to her mother. The mother denies the use of any NSAID agents at all. No alcohol. No prior complaints of abdominal pain, nausea, or vomiting. The mother does state that an hour or so after her seizures, she will have significant vomiting. The patient according to the mother has never had hematemesis in the past.    The notes and nurses describe her biting her tongue. PAST MEDICAL HISTORY:  Positive for epilepsy, brain injury and damage. PAST SURGICAL HISTORY:  Positive for back surgery and eye muscle tightening. SOCIAL HISTORY:  Nonsmoker, nondrinker. ALLERGIES:  BEE STINGS, PERTUSSIS. MEDICATIONS ON ADMISSION:  Include birth control pills, Claritin, Flonase, Depo-Provera, Trileptal.    REVIEW OF SYSTEMS:  Unavailable from the patient. PHYSICAL EXAMINATION:  VITAL SIGNS:  Temperature is 98.8, pulse is 103, respirations are 16, blood pressure is 128/74. GENERAL:  Awake, intubated patient both with an NG tube and ET tube.   The patient cannot answer any questions or follow real instructions. HEAD, EYES, EARS, NOSE, AND THROAT:  Sclerae are anicteric. Conjunctivae are pink. Moist mucous membranes. SKIN:  Without petechiae or ecchymoses. NECK:  Supple. No adenopathy in the neck or the groin. CHEST:  Clear to auscultation and percussion. HEART:  Reveals a regular rate and rhythm. ABDOMEN:  Soft. Decreased bowel sounds. She described some tenderness with abdominal exam, but then she describes tenderness over her legs and her chest as well. Abdomen is quite soft. EXTREMITIES:  No cyanosis, clubbing, or edema. LABORATORY DATA:  WBC is 14.8, hemoglobin is 11.5, platelet count is 281,082. Urinalysis:  Moderate amount of blood, but only 0-5 rbc's. BMP reveals initial acidosis, elevated lactic acid. Current BUN is 5, creatinine 0.5. BUN/creatinine ratio is only 10. Her blood cultures, no growth after 20 hours. Blood gas on admission was 7.2. CT of the head without contrast, no acute abnormality. IMPRESSION AND PLAN:  1. Upper gastrointestinal bleeding, most likely represents Radha-Garcia tear and certainly bleeding from the mouth if she had significant injury to her tongue. Recommend decreasing the frequency of aspiration as NG tube may be irritating the gastric lumen. Can proceed with an EGD tomorrow while the patient is still intubated to assess for an ulcer disease or more significant gastritis or esophagitis. 2.  Status post grand mal seizure. 3.  Mental retardation. Plan is as outlined above.       MD ZION [id number]  D:  04/07/2019 14:19  T:  04/07/2019 15:00  JOB #:  [id number]

## 2019-04-08 NOTE — PROGRESS NOTES
[facility]  Pulmonary, Critical Care, and Sleep Medicine    Name: Willard Ambrose MRN: [id number]   : 1985 Hospital: [facility]   Date: 2019        Critical Care Progress Note    IMPRESSION:   · Recurrent seizures   · Respiratory failure and intubated for airway protection secondary to above  · Dorette Speedy Syndrome  · H/O Shunt and spine surgery  · Group home resident   · Last seizure 14 years ago  · Severe acidosis  · GI bleed       RECOMMENDATIONS:   · Vent support, not ready for weaning  · Neurology following   · IV propofol   · Seizure meds per Neurology  · ICU protocols   · DVT and GI prophylaxis  · GI following   · Sedation  · Replete K     Subjective/History:     Sedated, intubated  No acute events overnight      The patient is critically ill and can not provide additional history due to intubated       Current Facility-Administered Medications   Medication Dose Route Frequency    dextrose 5 % - 0.45% NaCl infusion  125 mL/hr IntraVENous CONTINUOUS    levETIRAcetam in saline (iso-os) (KEPPRA) infusion 1,000 mg  1,000 mg IntraVENous Q12H    propofol (DIPRIVAN) infusion  0-50 mcg/kg/min IntraVENous TITRATE    sodium chloride (NS) flush 5-40 mL  5-40 mL IntraVENous Q8H    insulin lispro (HUMALOG) injection   SubCUTAneous Q6H    mupirocin (BACTROBAN) 2 % ointment   Both Nostrils Q12H    chlorhexidine (PERIDEX) 0.12 % mouthwash 15 mL  15 mL Oral Q12H    pantoprazole (PROTONIX) 40 mg in sodium chloride 0.9% 10 mL injection  40 mg IntraVENous Q12H       Review of Systems:  Unobtainable     Objective:   Vital Signs:    Visit Vitals  /83   Pulse 61   Temp 98.1 °F (36.7 °C)   Resp 16   Ht 5' 2\" (1.575 m)   Wt 77 kg (169 lb 12.1 oz)   SpO2 100%   BMI 31.05 kg/m²       O2 Device: Endotracheal tube, Ventilator   O2 Flow Rate (L/min): 2 l/min   Temp (24hrs), Av.8 °F (37.1 °C), Min:98.1 °F (36.7 °C), Max:99.5 °F (37.5 °C)       Intake/Output:   Last shift: 04/08 0701 - 04/08 1900  In: -   Out: 130 [Urine:130]  Last 3 shifts: 04/06 1901 - 04/08 0700  In: 5719.8 [I.V.:5609.8]  Out: 2805 [Urine:1780]    Intake/Output Summary (Last 24 hours) at 4/8/2019 0851  Last data filed at 4/8/2019 0731  Gross per 24 hour   Intake 3625.21 ml   Output 1605 ml   Net 2020.21 ml     Hemodynamics:   PAP:   CO:     Wedge:   CI:     CVP:    SVR:       PVR:       Ventilator Settings:  Mode Rate Tidal Volume Pressure FiO2 PEEP   Assist control(Pt. Started vomiting and gagging on ETT. Placed back on a ra)   500 ml  6 cm H2O 40 % 6 cm H20     Peak airway pressure: 33 cm H2O    Minute ventilation: 8.52 l/min      Physical Exam:    General:  Intubated and sedated    Head:  Normocephalic, without obvious abnormality, atraumatic. Eyes:  Conjunctivae/corneas clear. PERRL, EOMs intact. Nose: Nares normal. Septum midline. Mucosa normal. No drainage or sinus tenderness. Throat: Intubated, vent    Neck: Supple, symmetrical, trachea midline, no adenopathy, thyroid: no enlargment/tenderness/nodules, no carotid bruit and no JVD. Back:   Symmetric, no curvature. ROM normal.   Lungs:   Decreased breath sounds    Chest wall:  No tenderness or deformity. Heart:  Regular rate and rhythm, S1, S2 normal, no murmur, click, rub or gallop. Abdomen:   Copious GI secretions, coffee ground. Soft, non-tender. Bowel sounds normal. No masses,  No organomegaly. Extremities: Extremities normal, atraumatic, no cyanosis or edema. Pulses: 2+ and symmetric all extremities.    Skin: Skin color, texture, turgor normal. No rashes or lesions   Lymph nodes: Cervical, supraclavicular, and axillary nodes normal.   Neurologic: Sedated        Data:     Recent Results (from the past 24 hour(s))   GLUCOSE, POC    Collection Time: 04/07/19 11:59 AM   Result Value Ref Range    Glucose (POC) 100 65 - 100 mg/dL    Performed by Manish Hurst    HGB & HCT    Collection Time: 04/07/19  3:45 PM   Result Value Ref Range    HGB 11.7 11.5 - 16.0 g/dL    HCT 34.2 (L) 35.0 - 47.0 %   PROTHROMBIN TIME + INR    Collection Time: 04/07/19  3:45 PM   Result Value Ref Range    INR 1.1 0.9 - 1.1      Prothrombin time 10.9 9.0 - 11.1 sec   GLUCOSE, POC    Collection Time: 04/07/19  7:30 PM   Result Value Ref Range    Glucose (POC) 79 65 - 100 mg/dL    Performed by [email or web address]    GLUCOSE, POC    Collection Time: 04/07/19  7:31 PM   Result Value Ref Range    Glucose (POC) 80 65 - 100 mg/dL    Performed by [email or web address]    GLUCOSE, POC    Collection Time: 04/07/19  7:51 PM   Result Value Ref Range    Glucose (POC) 150 (H) 65 - 100 mg/dL    Performed by Dawood Love    GLUCOSE, POC    Collection Time: 04/07/19 11:58 PM   Result Value Ref Range    Glucose (POC) 76 65 - 100 mg/dL    Performed by Dawood Love    GLUCOSE, POC    Collection Time: 04/08/19 12:32 AM   Result Value Ref Range    Glucose (POC) 123 (H) 65 - 100 mg/dL    Performed by Dawood Love    CBC WITH AUTOMATED DIFF    Collection Time: 04/08/19  4:54 AM   Result Value Ref Range    WBC 10.9 3.6 - 11.0 K/uL    RBC 3.80 3.80 - 5.20 M/uL    HGB 10.9 (L) 11.5 - 16.0 g/dL    HCT 31.8 (L) 35.0 - 47.0 %    MCV 83.7 80.0 - 99.0 FL    MCH 28.7 26.0 - 34.0 PG    MCHC 34.3 30.0 - 36.5 g/dL    RDW 13.5 11.5 - 14.5 %    PLATELET 471 286 - 834 K/uL    MPV 9.5 8.9 - 12.9 FL    NRBC 0.0 0  WBC    ABSOLUTE NRBC 0.00 0.00 - 0.01 K/uL    NEUTROPHILS 74 32 - 75 %    LYMPHOCYTES 16 12 - 49 %    MONOCYTES 9 5 - 13 %    EOSINOPHILS 1 0 - 7 %    BASOPHILS 0 0 - 1 %    IMMATURE GRANULOCYTES 0 0.0 - 0.5 %    ABS. NEUTROPHILS 8.0 1.8 - 8.0 K/UL    ABS. LYMPHOCYTES 1.8 0.8 - 3.5 K/UL    ABS. MONOCYTES 1.0 0.0 - 1.0 K/UL    ABS. EOSINOPHILS 0.1 0.0 - 0.4 K/UL    ABS. BASOPHILS 0.0 0.0 - 0.1 K/UL    ABS. IMM.  GRANS. 0.0 0.00 - 0.04 K/UL    DF AUTOMATED     METABOLIC PANEL, BASIC    Collection Time: 04/08/19  4:54 AM   Result Value Ref Range    Sodium 139 136 - 145 mmol/L    Potassium 3.0 (L) 3.5 - 5.1 mmol/L    Chloride 112 (H) 97 - 108 mmol/L    CO2 17 (L) 21 - 32 mmol/L    Anion gap 10 5 - 15 mmol/L    Glucose 105 (H) 65 - 100 mg/dL    BUN 5 (L) 6 - 20 MG/DL    Creatinine 0.43 (L) 0.55 - 1.02 MG/DL    BUN/Creatinine ratio 12 12 - 20      GFR est AA >60 >60 ml/min/1.73m2    GFR est non-AA >60 >60 ml/min/1.73m2    Calcium 7.8 (L) 8.5 - 10.1 MG/DL   PHOSPHORUS    Collection Time: 04/08/19  4:54 AM   Result Value Ref Range    Phosphorus 3.0 2.6 - 4.7 MG/DL   MAGNESIUM    Collection Time: 04/08/19  4:54 AM   Result Value Ref Range    Magnesium 2.1 1.6 - 2.4 mg/dL   POC G3 - PUL    Collection Time: 04/08/19  4:54 AM   Result Value Ref Range    FIO2 (POC) 40 %    pH (POC) 7.405 7.35 - 7.45      pCO2 (POC) 25.2 (L) 35.0 - 45.0 MMHG    pO2 (POC) 171 (H) 80 - 100 MMHG    HCO3 (POC) 15.8 (L) 22 - 26 MMOL/L    sO2 (POC) 100 (H) 92 - 97 %    Base deficit (POC) 9 mmol/L    Site LEFT RADIAL      Device: VENT      Mode ASSIST CONTROL      Tidal volume 500 ml    Set Rate 16 bpm    PEEP/CPAP (POC) 6 cmH2O    Allens test (POC) YES      Specimen type (POC) ARTERIAL      Total resp.  rate 16     GLUCOSE, POC    Collection Time: 04/08/19  6:07 AM   Result Value Ref Range    Glucose (POC) 109 (H) 65 - 100 mg/dL    Performed by Jessy Kimball                Imaging:  I have personally reviewed the patients radiographs and have reviewed the reports:  CXR: no ASDZ    D/W Nursing and mom   Total critical care time exclusive of procedures:   minutes  Leta Zavala MD

## 2019-04-08 NOTE — PROGRESS NOTES
TRANSFER - OUT REPORT:    Verbal report given to Jennifer(name) on Darlyn Horse  being transferred to [facility](unit) for routine progression of care       Report consisted of patients Situation, Background, Assessment and   Recommendations(SBAR). Information from the following report(s) Procedure Summary and Recent Results was reviewed with the receiving nurse. Lines:   Peripheral IV 04/06/19 Left Wrist (Active)   Site Assessment Clean, dry, & intact 4/8/2019  3:42 PM   Phlebitis Assessment 0 4/8/2019  3:42 PM   Infiltration Assessment 0 4/8/2019  3:42 PM   Dressing Status Clean, dry, & intact 4/8/2019  3:42 PM   Dressing Type Transparent;Tape 4/8/2019  3:42 PM   Hub Color/Line Status Pink; Infusing;Patent 4/8/2019  3:42 PM   Alcohol Cap Used Yes 4/8/2019  4:00 AM       Peripheral IV 04/06/19 Right Antecubital (Active)   Site Assessment Clean, dry, & intact 4/8/2019  3:42 PM   Phlebitis Assessment 0 4/8/2019  3:42 PM   Infiltration Assessment 0 4/8/2019  3:42 PM   Dressing Status Clean, dry, & intact 4/8/2019  3:42 PM   Dressing Type Transparent;Tape 4/8/2019  3:42 PM   Hub Color/Line Status Pink; Infusing;Patent 4/8/2019  3:42 PM   Alcohol Cap Used Yes 4/8/2019  4:00 AM        Opportunity for questions and clarification was provided.       Patient transported with:   Registered Nurse

## 2019-04-08 NOTE — H&P
Pre-endoscopy H and P    The patient was seen and examined in the ccu. She is intubated and cannot give a history. I am doing this for blood loss anemia. The airway was assessed and docuemented. The problem list, past medical history, and medications were reviewed.      Patient Active Problem List   Diagnosis Code    Status epilepticus ([facility] G40.901     Social History     Socioeconomic History    Marital status: SINGLE     Spouse name: Not on file    Number of children: Not on file    Years of education: Not on file    Highest education level: Not on file   Occupational History    Not on file   Social Needs    Financial resource strain: Not on file    Food insecurity:     Worry: Not on file     Inability: Not on file    Transportation needs:     Medical: Not on file     Non-medical: Not on file   Tobacco Use    Smoking status: Never Smoker    Smokeless tobacco: Never Used   Substance and Sexual Activity    Alcohol use: No    Drug use: Not on file    Sexual activity: Not on file   Lifestyle    Physical activity:     Days per week: Not on file     Minutes per session: Not on file    Stress: Not on file   Relationships    Social connections:     Talks on phone: Not on file     Gets together: Not on file     Attends [employer] service: Not on file     Active member of club or organization: Not on file     Attends meetings of clubs or organizations: Not on file     Relationship status: Not on file    Intimate partner violence:     Fear of current or ex partner: Not on file     Emotionally abused: Not on file     Physically abused: Not on file     Forced sexual activity: Not on file   Other Topics Concern    Not on file   Social History Narrative    Not on file     Past Medical History:   Diagnosis Date    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 Taking? OXcarbazepine (TRILEPTAL) 300 mg tablet   No Yes   Sig: Take 1 Tab by mouth two (2) times a day. fluticasone propionate (FLONASE) 50 mcg/actuation nasal spray   Yes Yes   Si Sprays by Both Nostrils route daily. loratadine (CLARITIN) 10 mg tablet   Yes Yes   Sig: Take 10 mg by mouth.   medroxyPROGESTERone (DEPO-PROVERA) 150 mg/mL injection   Yes Yes   Si mg by IntraMUSCular route once. norgestimate-ethinyl estradiol (TRINESSA, 28,) 0.18/0.215/0.25 mg-35 mcg (28) tab   Yes Yes   Sig: Take  by mouth. Facility-Administered Medications: None           The review of systems is:  Na (on ventilator) for shortness of breath or chest pain      The heart, lungs, and mental status were satisfactory for the administration of anesthesia sedation and for the procedure. I discussed with the patient's mom the objectives, risks, consequences and alternatives to the procedure.       Keerthi Purvis MD  2019  3:54 PM

## 2019-04-09 ENCOUNTER — APPOINTMENT (OUTPATIENT)
Dept: GENERAL RADIOLOGY | Age: 34
DRG: 100 | End: 2019-04-09
Attending: INTERNAL MEDICINE
Payer: MEDICARE

## 2019-04-09 LAB
ALBUMIN SERPL-MCNC: 2.9 G/DL (ref 3.5–5)
ALBUMIN/GLOB SERPL: 0.7 {RATIO} (ref 1.1–2.2)
ALP SERPL-CCNC: 57 U/L (ref 45–117)
ALT SERPL-CCNC: 15 U/L (ref 12–78)
ANION GAP SERPL CALC-SCNC: 6 MMOL/L (ref 5–15)
ARTERIAL PATENCY WRIST A: ABNORMAL
AST SERPL-CCNC: 37 U/L (ref 15–37)
BASE DEFICIT BLD-SCNC: 4 MMOL/L
BDY SITE: ABNORMAL
BILIRUB SERPL-MCNC: 0.4 MG/DL (ref 0.2–1)
BUN SERPL-MCNC: 2 MG/DL (ref 6–20)
BUN/CREAT SERPL: 6 (ref 12–20)
CALCIUM SERPL-MCNC: 8.3 MG/DL (ref 8.5–10.1)
CHLORIDE SERPL-SCNC: 110 MMOL/L (ref 97–108)
CO2 SERPL-SCNC: 24 MMOL/L (ref 21–32)
CREAT SERPL-MCNC: 0.35 MG/DL (ref 0.55–1.02)
ERYTHROCYTE [DISTWIDTH] IN BLOOD BY AUTOMATED COUNT: 13.2 % (ref 11.5–14.5)
GAS FLOW.O2 O2 DELIVERY SYS: ABNORMAL L/MIN
GLOBULIN SER CALC-MCNC: 3.9 G/DL (ref 2–4)
GLUCOSE BLD STRIP.AUTO-MCNC: 124 MG/DL (ref 65–100)
GLUCOSE BLD STRIP.AUTO-MCNC: 129 MG/DL (ref 65–100)
GLUCOSE BLD STRIP.AUTO-MCNC: 140 MG/DL (ref 65–100)
GLUCOSE SERPL-MCNC: 116 MG/DL (ref 65–100)
HCO3 BLD-SCNC: 21.3 MMOL/L (ref 22–26)
HCT VFR BLD AUTO: 34.9 % (ref 35–47)
HGB BLD-MCNC: 12 G/DL (ref 11.5–16)
MCH RBC QN AUTO: 29.1 PG (ref 26–34)
MCHC RBC AUTO-ENTMCNC: 34.4 G/DL (ref 30–36.5)
MCV RBC AUTO: 84.7 FL (ref 80–99)
NRBC # BLD: 0 K/UL (ref 0–0.01)
NRBC BLD-RTO: 0 PER 100 WBC
O2/TOTAL GAS SETTING VFR VENT: 21 %
PCO2 BLD: 34.5 MMHG (ref 35–45)
PH BLD: 7.4 [PH] (ref 7.35–7.45)
PLATELET # BLD AUTO: 300 K/UL (ref 150–400)
PMV BLD AUTO: 9.9 FL (ref 8.9–12.9)
PO2 BLD: 79 MMHG (ref 80–100)
POTASSIUM SERPL-SCNC: 3.4 MMOL/L (ref 3.5–5.1)
PROT SERPL-MCNC: 6.8 G/DL (ref 6.4–8.2)
RBC # BLD AUTO: 4.12 M/UL (ref 3.8–5.2)
SAO2 % BLD: 96 % (ref 92–97)
SERVICE CMNT-IMP: ABNORMAL
SODIUM SERPL-SCNC: 140 MMOL/L (ref 136–145)
SPECIMEN TYPE: ABNORMAL
TOTAL RESP. RATE, ITRR: 20
WBC # BLD AUTO: 9.9 K/UL (ref 3.6–11)

## 2019-04-09 PROCEDURE — 36415 COLL VENOUS BLD VENIPUNCTURE: CPT

## 2019-04-09 PROCEDURE — 85027 COMPLETE CBC AUTOMATED: CPT

## 2019-04-09 PROCEDURE — 65660000000 HC RM CCU STEPDOWN

## 2019-04-09 PROCEDURE — 80053 COMPREHEN METABOLIC PANEL: CPT

## 2019-04-09 PROCEDURE — 74011000258 HC RX REV CODE- 258: Performed by: HOSPITALIST

## 2019-04-09 PROCEDURE — 74011250637 HC RX REV CODE- 250/637: Performed by: INTERNAL MEDICINE

## 2019-04-09 PROCEDURE — 77010033678 HC OXYGEN DAILY

## 2019-04-09 PROCEDURE — 82962 GLUCOSE BLOOD TEST: CPT

## 2019-04-09 PROCEDURE — 71045 X-RAY EXAM CHEST 1 VIEW: CPT

## 2019-04-09 PROCEDURE — 36600 WITHDRAWAL OF ARTERIAL BLOOD: CPT

## 2019-04-09 PROCEDURE — 82803 BLOOD GASES ANY COMBINATION: CPT

## 2019-04-09 PROCEDURE — 74011000258 HC RX REV CODE- 258: Performed by: INTERNAL MEDICINE

## 2019-04-09 PROCEDURE — 74011250637 HC RX REV CODE- 250/637: Performed by: PHYSICIAN ASSISTANT

## 2019-04-09 RX ORDER — PANTOPRAZOLE SODIUM 40 MG/1
40 TABLET, DELAYED RELEASE ORAL
Status: DISCONTINUED | OUTPATIENT
Start: 2019-04-09 | End: 2019-04-09

## 2019-04-09 RX ORDER — PANTOPRAZOLE SODIUM 40 MG/1
40 TABLET, DELAYED RELEASE ORAL
Status: DISCONTINUED | OUTPATIENT
Start: 2019-04-10 | End: 2019-04-10 | Stop reason: HOSPADM

## 2019-04-09 RX ORDER — LEVETIRACETAM 500 MG/1
1000 TABLET ORAL 2 TIMES DAILY
Status: DISCONTINUED | OUTPATIENT
Start: 2019-04-09 | End: 2019-04-10 | Stop reason: HOSPADM

## 2019-04-09 RX ORDER — POLYETHYLENE GLYCOL 3350 17 G/17G
17 POWDER, FOR SOLUTION ORAL DAILY
Status: DISCONTINUED | OUTPATIENT
Start: 2019-04-09 | End: 2019-04-10 | Stop reason: HOSPADM

## 2019-04-09 RX ORDER — HYDROMORPHONE HYDROCHLORIDE 2 MG/ML
0.5 INJECTION, SOLUTION INTRAMUSCULAR; INTRAVENOUS; SUBCUTANEOUS
Status: DISCONTINUED | OUTPATIENT
Start: 2019-04-09 | End: 2019-04-10 | Stop reason: HOSPADM

## 2019-04-09 RX ORDER — POTASSIUM CHLORIDE 750 MG/1
20 TABLET, FILM COATED, EXTENDED RELEASE ORAL
Status: COMPLETED | OUTPATIENT
Start: 2019-04-09 | End: 2019-04-09

## 2019-04-09 RX ADMIN — Medication 10 ML: at 22:24

## 2019-04-09 RX ADMIN — POLYETHYLENE GLYCOL 3350 17 G: 17 POWDER, FOR SOLUTION ORAL at 10:24

## 2019-04-09 RX ADMIN — DEXTROSE MONOHYDRATE AND SODIUM CHLORIDE 50 ML/HR: 5; .45 INJECTION, SOLUTION INTRAVENOUS at 23:45

## 2019-04-09 RX ADMIN — DEXTROSE MONOHYDRATE AND SODIUM CHLORIDE 125 ML/HR: 5; .45 INJECTION, SOLUTION INTRAVENOUS at 06:20

## 2019-04-09 RX ADMIN — Medication 10 ML: at 06:04

## 2019-04-09 RX ADMIN — MUPIROCIN: 20 OINTMENT TOPICAL at 10:19

## 2019-04-09 RX ADMIN — Medication 10 ML: at 13:40

## 2019-04-09 RX ADMIN — LEVETIRACETAM 1000 MG: 500 TABLET ORAL at 10:17

## 2019-04-09 RX ADMIN — LEVETIRACETAM 1000 MG: 500 TABLET ORAL at 18:07

## 2019-04-09 RX ADMIN — POTASSIUM CHLORIDE 20 MEQ: 750 TABLET, EXTENDED RELEASE ORAL at 10:18

## 2019-04-09 RX ADMIN — MUPIROCIN: 20 OINTMENT TOPICAL at 22:24

## 2019-04-09 NOTE — PROGRESS NOTES
[facility]  Pulmonary, Critical Care, and Sleep Medicine    Name: Tip Cook MRN: [id number]   : 1985 Hospital: [facility]   Date: 2019        Critical Care Progress Note    IMPRESSION:   · Recurrent seizures   · Respiratory failure and intubated for airway protection secondary to above  · [place] Syndrome  · H/O Shunt and spine surgery  · Group home resident   · Last seizure 14 years ago  · Severe acidosis  · GI bleed       RECOMMENDATIONS:   · Extubated, on RA  · Neurology following   · Seizure meds per Neurology  · ICU protocols   · DVT and GI prophylaxis  · GI following   · Replete K again today  · Advance diet  · Mobilize  · Transfer to floor today     Subjective/History:     Awake, alert  No acute distress  No acute events overnight        Current Facility-Administered Medications   Medication Dose Route Frequency    dextrose 5 % - 0.45% NaCl infusion  125 mL/hr IntraVENous CONTINUOUS    levETIRAcetam in saline (iso-os) (KEPPRA) infusion 1,000 mg  1,000 mg IntraVENous Q12H    sodium chloride (NS) flush 5-40 mL  5-40 mL IntraVENous Q8H    insulin lispro (HUMALOG) injection   SubCUTAneous Q6H    mupirocin (BACTROBAN) 2 % ointment   Both Nostrils Q12H    chlorhexidine (PERIDEX) 0.12 % mouthwash 15 mL  15 mL Oral Q12H    pantoprazole (PROTONIX) 40 mg in sodium chloride 0.9% 10 mL injection  40 mg IntraVENous Q12H       Review of Systems:  No sob, no cp    Objective:   Vital Signs:    Visit Vitals  /68   Pulse 86   Temp 98.6 °F (37 °C)   Resp 24   Ht 5' 2\" (1.575 m)   Wt 73.2 kg (161 lb 6 oz)   SpO2 99%   Breastfeeding?  No   BMI 29.52 kg/m²       O2 Device: Room air   O2 Flow Rate (L/min): 2 l/min   Temp (24hrs), Av.3 °F (36.8 °C), Min:98 °F (36.7 °C), Max:98.6 °F (37 °C)       Intake/Output:   Last shift:      701 - 1900  In: -   Out: 250 [Urine:250]  Last 3 shifts: 1901 -  07  In: 5900.2 [I.V.:5900.2]  Out: 1001 [Urine:4390]    Intake/Output Summary (Last 24 hours) at 4/9/2019 0847  Last data filed at 4/9/2019 0759  Gross per 24 hour   Intake 3919 ml   Output 3950 ml   Net -31 ml     Hemodynamics:   PAP:   CO:     Wedge:   CI:     CVP:    SVR:       PVR:       Ventilator Settings:  Mode Rate Tidal Volume Pressure FiO2 PEEP   Assist control   500 ml  6 cm H2O 40 % 6 cm H20     Peak airway pressure: 31 cm H2O    Minute ventilation: 8.5 l/min      Physical Exam:    General:  Awake, no distress    Head:  Normocephalic, without obvious abnormality, atraumatic. Eyes:  Conjunctivae/corneas clear. PERRL, EOMs intact. Nose: Nares normal. Septum midline. Mucosa normal. No drainage or sinus tenderness. Throat: No thrush    Neck: Supple, symmetrical, trachea midline, no adenopathy, thyroid: no enlargment/tenderness/nodules, no carotid bruit and no JVD. Back:   Symmetric, no curvature. ROM normal.   Lungs:   Decreased breath sounds    Chest wall:  No tenderness or deformity. Heart:  Regular rate and rhythm, S1, S2 normal, no murmur, click, rub or gallop. Abdomen:   Copious GI secretions, coffee ground. Soft, non-tender. Bowel sounds normal. No masses,  No organomegaly. Extremities: Extremities normal, atraumatic, no cyanosis or edema. Pulses: 2+ and symmetric all extremities.    Skin: Skin color, texture, turgor normal. No rashes or lesions   Lymph nodes: Cervical, supraclavicular, and axillary nodes normal.   Neurologic: Non focal        Data:     Recent Results (from the past 24 hour(s))   GLUCOSE, POC    Collection Time: 04/08/19 12:16 PM   Result Value Ref Range    Glucose (POC) 130 (H) 65 - 100 mg/dL    Performed by Aubrey Sang    POC H. PYLORI, TISSUE    Collection Time: 04/08/19  4:10 PM   Result Value Ref Range    H. pylori from tissue Negative Negative    Positive control pos     Negative control neg     Lot no. [id number]    GLUCOSE, POC    Collection Time: 04/08/19  5:57 PM   Result Value Ref Range Glucose (POC) 113 (H) 65 - 100 mg/dL    Performed by Cory White    GLUCOSE, POC    Collection Time: 04/08/19 11:47 PM   Result Value Ref Range    Glucose (POC) 121 (H) 65 - 100 mg/dL    Performed by Jessy Kimball    METABOLIC PANEL, COMPREHENSIVE    Collection Time: 04/09/19  4:18 AM   Result Value Ref Range    Sodium 140 136 - 145 mmol/L    Potassium 3.4 (L) 3.5 - 5.1 mmol/L    Chloride 110 (H) 97 - 108 mmol/L    CO2 24 21 - 32 mmol/L    Anion gap 6 5 - 15 mmol/L    Glucose 116 (H) 65 - 100 mg/dL    BUN 2 (L) 6 - 20 MG/DL    Creatinine 0.35 (L) 0.55 - 1.02 MG/DL    BUN/Creatinine ratio 6 (L) 12 - 20      GFR est AA >60 >60 ml/min/1.73m2    GFR est non-AA >60 >60 ml/min/1.73m2    Calcium 8.3 (L) 8.5 - 10.1 MG/DL    Bilirubin, total 0.4 0.2 - 1.0 MG/DL    ALT (SGPT) 15 12 - 78 U/L    AST (SGOT) 37 15 - 37 U/L    Alk. phosphatase 57 45 - 117 U/L    Protein, total 6.8 6.4 - 8.2 g/dL    Albumin 2.9 (L) 3.5 - 5.0 g/dL    Globulin 3.9 2.0 - 4.0 g/dL    A-G Ratio 0.7 (L) 1.1 - 2.2     CBC W/O DIFF    Collection Time: 04/09/19  4:18 AM   Result Value Ref Range    WBC 9.9 3.6 - 11.0 K/uL    RBC 4.12 3.80 - 5.20 M/uL    HGB 12.0 11.5 - 16.0 g/dL    HCT 34.9 (L) 35.0 - 47.0 %    MCV 84.7 80.0 - 99.0 FL    MCH 29.1 26.0 - 34.0 PG    MCHC 34.4 30.0 - 36.5 g/dL    RDW 13.2 11.5 - 14.5 %    PLATELET 447 511 - 024 K/uL    MPV 9.9 8.9 - 12.9 FL    NRBC 0.0 0  WBC    ABSOLUTE NRBC 0.00 0.00 - 0.01 K/uL   POC G3 - PUL    Collection Time: 04/09/19  4:42 AM   Result Value Ref Range    FIO2 (POC) 21 %    pH (POC) 7.399 7.35 - 7.45      pCO2 (POC) 34.5 (L) 35.0 - 45.0 MMHG    pO2 (POC) 79 (L) 80 - 100 MMHG    HCO3 (POC) 21.3 (L) 22 - 26 MMOL/L    sO2 (POC) 96 92 - 97 %    Base deficit (POC) 4 mmol/L    Site LEFT BRACHIAL      Device: ROOM AIR      Allens test (POC) N/A      Specimen type (POC) ARTERIAL      Total resp.  rate 20     GLUCOSE, POC    Collection Time: 04/09/19  5:51 AM   Result Value Ref Range    Glucose (POC) 124 (H) 65 - 100 mg/dL    Performed by Pheedoling                Imaging:  I have personally reviewed the patients radiographs and have reviewed the reports:  CXR: LLL atx    D/W Nursing and mom   Total critical care time exclusive of procedures:   minutes  Sumanth Ely MD

## 2019-04-09 NOTE — PROGRESS NOTES
TRANSFER - IN REPORT:    Verbal report received from Walter Garcia (name) on Mendez Aver  being received from CCU (unit) for progression of care. Report consisted of patients Situation, Background, Assessment and   Recommendations(SBAR). Information from the following report(s) SBAR, Kardex, Intake/Output and MAR was reviewed with the receiving nurse. Assessment completed upon patients arrival to unit and care assumed.

## 2019-04-09 NOTE — PROGRESS NOTES
7511: Bedside shift change report given to Batsheva Cornejo RN (oncoming nurse) by Anne Chavira RN (offgoing nurse). Report included the following information SBAR, Kardex, Intake/Output, MAR, Recent Results, Cardiac Rhythm Sinus rhythm and Alarm Parameters . Assumed care of patient at this time, reviewed chart, and completed full assessment. Patient resting quietly in bed with bed wheels locked, bed in low position, bed alarm set, side rails up x3, and call bell within reach. Will continue to monitor closely. 1122: Patient's Beltre catheter was removed per nurse-driven protocol. Will continue to monitor closely. 1132: Patient ambulated to the toilet with assistance x2 and had occurrence of both urine and stool. Afterwards, patient washed hands, brushed teeth at sink and was assisted to the bedside chair. Patient sitting quietly in chair watching television with chair wheels locked, chair alarm set, and call bell within reach. 1759: TRANSFER - OUT REPORT:    Verbal report given to Carmen Pedro RN (name) on Shahzad Caballero  being transferred to Neuro (unit) for routine progression of care       Report consisted of patients Situation, Background, Assessment and   Recommendations(SBAR). Information from the following report(s) SBAR, Kardex, Intake/Output, MAR, Recent Results, Cardiac Rhythm Sinus rhythm and Alarm Parameters  was reviewed with the receiving nurse. Lines:   Peripheral IV 04/06/19 Left Wrist (Active)   Site Assessment Clean, dry, & intact 4/9/2019  4:00 AM   Phlebitis Assessment 0 4/9/2019  4:00 AM   Infiltration Assessment 0 4/9/2019  4:00 AM   Dressing Status Clean, dry, & intact 4/9/2019  4:00 AM   Dressing Type Tape;Transparent 4/9/2019  4:00 AM   Hub Color/Line Status Pink; Infusing 4/9/2019  4:00 AM   Alcohol Cap Used Yes 4/9/2019  4:00 AM       Peripheral IV 04/06/19 Right Antecubital (Active)   Site Assessment Clean, dry, & intact 4/9/2019  4:00 AM   Phlebitis Assessment 0 4/9/2019  4:00 AM   Infiltration Assessment 0 4/9/2019  4:00 AM   Dressing Status Clean, dry, & intact 4/9/2019  4:00 AM   Dressing Type Tape;Transparent 4/9/2019  4:00 AM   Hub Color/Line Status Pink;Capped;Flushed 4/9/2019  4:00 AM   Alcohol Cap Used Yes 4/9/2019  4:00 AM        Opportunity for questions and clarification was provided.       Patient transported with:   Monitor  Registered Nurse  Tech

## 2019-04-09 NOTE — INTERDISCIPLINARY ROUNDS
Interdisciplinary team rounds were held 4/9/2019 with the following team members:Care Management, Diabetes Treatment Specialist, Nursing, Nutrition, Pharmacy, Physician, Respiratory Therapy, Clinical Coordinator and Urology coordinator. Plan of care discussed. See clinical pathway and/or care plan for interventions and desired outcomes.

## 2019-04-09 NOTE — PROGRESS NOTES
Pt ambulated in the [place]clair using a cart for approximately 200ft with minimal assistance. Pt tolerated well. Returned pt to chair with monitor in place. Pt with no complaints at this time.  Angel Virk RN

## 2019-04-09 NOTE — PROGRESS NOTES
Gastroenterology Daily Progress Note (Dr. Shaye Nunez)   [facility]    Admit Date: 4/6/2019       Subjective:       Extubated and OGT removed. Regular diet ordered and has transfer orders to the floor. EGD yesterday: The esophagus looked normal.    The z-line was located at 40 cm. There were linear erosions and ulcer along the greater curve. By location and appearance these aer related to og tube trauma. There were no other diagnostic abnormalities of the body, fundus, antrum, cardia and incisura of the stomach. This included direct and retroflexion examination. The first and second portion of the duodenum appeared normal.      H. Pylori negative    No BM since admission.   Current Facility-Administered Medications   Medication Dose Route Frequency    pantoprazole (PROTONIX) tablet 40 mg  40 mg Oral ACB&D    potassium chloride SR (KLOR-CON 10) tablet 20 mEq  20 mEq Oral NOW    dextrose 5 % - 0.45% NaCl infusion  50 mL/hr IntraVENous CONTINUOUS    ondansetron (ZOFRAN) injection 4 mg  4 mg IntraVENous Q6H PRN    levETIRAcetam in saline (iso-os) (KEPPRA) infusion 1,000 mg  1,000 mg IntraVENous Q12H    HYDROmorphone (PF) (DILAUDID) injection 0.5 mg  0.5 mg IntraVENous Q4H PRN    sodium chloride (NS) flush 5-10 mL  5-10 mL IntraVENous PRN    sodium chloride (NS) flush 5-40 mL  5-40 mL IntraVENous Q8H    sodium chloride (NS) flush 5-40 mL  5-40 mL IntraVENous PRN    LORazepam (ATIVAN) injection 1 mg  1 mg IntraVENous Q4H PRN    glucose chewable tablet 16 g  4 Tab Oral PRN    dextrose (D50W) injection syrg 12.5-25 g  12.5-25 g IntraVENous PRN    glucagon (GLUCAGEN) injection 1 mg  1 mg IntraMUSCular PRN    insulin lispro (HUMALOG) injection   SubCUTAneous Q6H    mupirocin (BACTROBAN) 2 % ointment   Both Nostrils Q12H        Objective:     Visit Vitals  /68   Pulse 86   Temp 98.6 °F (37 °C)   Resp 24   Ht 5' 2\" (1.575 m)   Wt 73.2 kg (161 lb 6 oz)   SpO2 99% Breastfeeding? No   BMI 29.52 kg/m²   Blood pressure 110/68, pulse 86, temperature 98.6 °F (37 °C), resp. rate 24, height 5' 2\" (1.575 m), weight 73.2 kg (161 lb 6 oz), SpO2 99 %, not currently breastfeeding. 04/09 0701 - 04/09 1900  In: -   Out: 250 [Urine:250]    04/07 1901 - 04/09 0700  In: 5900.2 [I.V.:5900.2]  Out: 8306 [Urine:4390]      Intake/Output Summary (Last 24 hours) at 4/9/2019 0953  Last data filed at 4/9/2019 0759  Gross per 24 hour   Intake 3672.1 ml   Output 3825 ml   Net -152.9 ml     Physical Exam:     General:  Awake, alert, WF in NAD. Mentally challenged but conversing appropriately. Chest:  CTA, No rhonchi, rales or rubs.   Heart: S1, S2, RRR  GI: Soft, mild epigastric tenderness, ND + bowel sounds    Labs:       Recent Results (from the past 24 hour(s))   GLUCOSE, POC    Collection Time: 04/08/19 12:16 PM   Result Value Ref Range    Glucose (POC) 130 (H) 65 - 100 mg/dL    Performed by Dania Green    POC H. PYLORI, TISSUE    Collection Time: 04/08/19  4:10 PM   Result Value Ref Range    H. pylori from tissue Negative Negative    Positive control pos     Negative control neg     Lot no. [id number]    GLUCOSE, POC    Collection Time: 04/08/19  5:57 PM   Result Value Ref Range    Glucose (POC) 113 (H) 65 - 100 mg/dL    Performed by Dania Green    GLUCOSE, POC    Collection Time: 04/08/19 11:47 PM   Result Value Ref Range    Glucose (POC) 121 (H) 65 - 100 mg/dL    Performed by Antione Watkins    METABOLIC PANEL, COMPREHENSIVE    Collection Time: 04/09/19  4:18 AM   Result Value Ref Range    Sodium 140 136 - 145 mmol/L    Potassium 3.4 (L) 3.5 - 5.1 mmol/L    Chloride 110 (H) 97 - 108 mmol/L    CO2 24 21 - 32 mmol/L    Anion gap 6 5 - 15 mmol/L    Glucose 116 (H) 65 - 100 mg/dL    BUN 2 (L) 6 - 20 MG/DL    Creatinine 0.35 (L) 0.55 - 1.02 MG/DL    BUN/Creatinine ratio 6 (L) 12 - 20      GFR est AA >60 >60 ml/min/1.73m2    GFR est non-AA >60 >60 ml/min/1.73m2    Calcium 8.3 (L) 8.5 - 10.1 MG/DL    Bilirubin, total 0.4 0.2 - 1.0 MG/DL    ALT (SGPT) 15 12 - 78 U/L    AST (SGOT) 37 15 - 37 U/L    Alk. phosphatase 57 45 - 117 U/L    Protein, total 6.8 6.4 - 8.2 g/dL    Albumin 2.9 (L) 3.5 - 5.0 g/dL    Globulin 3.9 2.0 - 4.0 g/dL    A-G Ratio 0.7 (L) 1.1 - 2.2     CBC W/O DIFF    Collection Time: 04/09/19  4:18 AM   Result Value Ref Range    WBC 9.9 3.6 - 11.0 K/uL    RBC 4.12 3.80 - 5.20 M/uL    HGB 12.0 11.5 - 16.0 g/dL    HCT 34.9 (L) 35.0 - 47.0 %    MCV 84.7 80.0 - 99.0 FL    MCH 29.1 26.0 - 34.0 PG    MCHC 34.4 30.0 - 36.5 g/dL    RDW 13.2 11.5 - 14.5 %    PLATELET 167 358 - 177 K/uL    MPV 9.9 8.9 - 12.9 FL    NRBC 0.0 0  WBC    ABSOLUTE NRBC 0.00 0.00 - 0.01 K/uL   POC G3 - PUL    Collection Time: 04/09/19  4:42 AM   Result Value Ref Range    FIO2 (POC) 21 %    pH (POC) 7.399 7.35 - 7.45      pCO2 (POC) 34.5 (L) 35.0 - 45.0 MMHG    pO2 (POC) 79 (L) 80 - 100 MMHG    HCO3 (POC) 21.3 (L) 22 - 26 MMOL/L    sO2 (POC) 96 92 - 97 %    Base deficit (POC) 4 mmol/L    Site LEFT BRACHIAL      Device: ROOM AIR      Allens test (POC) N/A      Specimen type (POC) ARTERIAL      Total resp. rate 20     GLUCOSE, POC    Collection Time: 04/09/19  5:51 AM   Result Value Ref Range    Glucose (POC) 124 (H) 65 - 100 mg/dL    Performed by Mindi MARIO(wbc:2,hgb:2,hct:2,plt:2,)  Recent Labs     04/09/19  0418 04/08/19  0454 04/07/19  0413 04/06/19  1427    139 141  --    K 3.4* 3.0* 3.0*  --    * 112* 115*  --    CO2 24 17* 17*  --    BUN 2* 5* 5*  --    CREA 0.35* 0.43* 0.51*  --    * 105* 93  --    CA 8.3* 7.8* 7.7* 7.8*   MG  --  2.1 2.3 2.5*   PHOS  --  3.0 3.1  --      Recent Labs     04/07/19  1545   INR 1.1   PTP 10.9     Recent Labs     04/09/19 0418 04/06/19  1017   SGOT 37 12*   AP 57 95   TP 6.8 9.0*   ALB 2.9* 4.4   GLOB 3.9 4.6*         Impression:    Status epilepticus  UGI bleeding  Acute GI blood loss anemia  Mental retardation         Plan:  Patient with normal hgb. EGD revealed gastric erosions and ulcer, possbly related to tube trauma. H. Pylori negative. Low risk of rebleeding.   Keep on PO PPI for 6 weeks.  -Add miralax for constipation       Jeanie Alvarez    4/9/2019 [address] [facility], [address] [place] [address]  [phone] [id number]  [address] [place] [address] [place]: [phone]

## 2019-04-09 NOTE — PROGRESS NOTES
1900: Bedside and verbal shift change report given to Nic Rivera, Student Nurse and Maribell Gomez RN (oncoming nurse) by Kendra Oviedo (offgoing nurse). Report included the following information SBAR, Kardex, OR Summary, Procedure Summary, Intake/Output, MAR and Accordion. 2000: Shift assessment completed. Pt's mother and brother visiting. Noticed pt answers \"yes\" to every question, even when rephrased. Mother stated this is pt's baseline and that she uses sign language at home.

## 2019-04-09 NOTE — PROGRESS NOTES
Hospitalist Progress Note    NAME: Rubina Mariscal   :  1985   MRN:  [id number]       Assessment / Plan:    Status Epilepticus   -s/p extubation   -doing well   History of Epilepsy  Appreciate input and guidance from Dr. Lyndon Zee  Will continue Nate Kohler as is      Leucocytosis most likely reactive   Lactic acidosis most likely post seizure : lactic acid>16  No symptoms of infection, will monitor   UA neg , chest xray wnl      GI bleed   Pt had an episode of bloody emesis while going to CT scan   Hb stable,   Upper Endoscopy today      Initially suspected to have DM2    Hgb A1c is 5.7   Will leave the SSI as is and we might discontinue if she does not require any coverage      We will switch pain medication from Morphine to Dilaudid     West syndrome  Mental retardation            Subjective:     Chief Complaint / Reason for Physician Visit  \"I am doing ok\" . Discussed with RN events overnight. Review of Systems:  Symptom Y/N Comments  Symptom Y/N Comments   Fever/Chills n   Chest Pain n    Poor Appetite n   Edema     Cough n   Abdominal Pain n    Sputum    Joint Pain n    SOB/BRITO n   Pruritis/Rash n    Nausea/vomit n   Tolerating PT/OT y    Diarrhea    Tolerating Diet y    Constipation    Other       Could NOT obtain due to:      Objective:     VITALS:   Last 24hrs VS reviewed since prior progress note.  Most recent are:  Patient Vitals for the past 24 hrs:   Temp Pulse Resp BP SpO2   19 1330 -- (!) 106 -- -- 98 %   19 1315 -- (!) 102 -- -- --   19 1300 -- 100 21 130/77 98 %   19 1245 -- 96 -- -- 98 %   19 1230 -- (!) 101 -- 120/78 98 %   19 1215 -- (!) 108 -- -- 99 %   19 1200 -- (!) 104 23 (!) 154/96 97 %   19 1145 -- 95 -- -- 97 %   19 1132 -- 100 24 -- 97 %   19 1130 -- 92 24 120/80 97 %   19 1115 -- 95 -- -- 97 %   19 1100 -- 93 24 121/72 97 %   19 1045 -- (!) 104 -- -- 97 %   19 1030 -- 98 22 133/70 98 %   19 1015 -- 88 -- -- 99 %   04/09/19 1000 -- 95 -- 134/82 --   04/09/19 0945 -- 87 20 -- 99 %   04/09/19 0930 -- 81 24 117/72 99 %   04/09/19 0915 -- 86 -- -- 98 %   04/09/19 0900 -- 85 24 128/76 98 %   04/09/19 0845 -- 87 24 -- 99 %   04/09/19 0830 -- 89 23 120/73 97 %   04/09/19 0815 -- 91 24 -- 97 %   04/09/19 0800 -- 86 24 114/74 98 %   04/09/19 0759 98.6 °F (37 °C) 86 24 -- 99 %   04/09/19 0745 -- 86 -- -- 98 %   04/09/19 0730 -- 91 22 120/67 99 %   04/09/19 0715 -- 91 24 -- 99 %   04/09/19 0700 -- 85 24 113/81 98 %   04/09/19 0600 -- 82 24 110/68 98 %   04/09/19 0500 -- 87 22 118/71 97 %   04/09/19 0400 98.3 °F (36.8 °C) 81 21 112/63 96 %   04/09/19 0300 -- 82 20 121/69 95 %   04/09/19 0200 -- 87 23 119/75 97 %   04/09/19 0100 -- 76 20 111/55 99 %   04/09/19 0000 98 °F (36.7 °C) 77 22 108/59 99 %   04/08/19 2300 -- 78 23 112/68 100 %   04/08/19 2200 -- 84 22 115/69 99 %   04/08/19 2100 -- 88 23 98/70 98 %   04/08/19 2000 98.2 °F (36.8 °C) 90 25 136/77 98 %   04/08/19 1900 -- 96 25 139/87 100 %   04/08/19 1845 -- 96 -- -- 99 %   04/08/19 1830 -- 93 24 133/71 100 %   04/08/19 1815 -- 92 -- -- 99 %   04/08/19 1800 -- 95 23 140/82 100 %   04/08/19 1745 -- 90 -- -- 99 %   04/08/19 1730 -- (!) 104 20 147/89 97 %   04/08/19 1715 -- 96 -- -- 99 %   04/08/19 1700 -- 95 22 142/88 99 %   04/08/19 1645 -- 90 16 -- 99 %   04/08/19 1630 -- 75 16 146/88 100 %   04/08/19 1624 -- 69 16 138/86 100 %   04/08/19 1622 -- 67 16 136/78 99 %   04/08/19 1619 -- 69 16 137/74 100 %       Intake/Output Summary (Last 24 hours) at 4/9/2019 1556  Last data filed at 4/9/2019 1122  Gross per 24 hour   Intake 2803.47 ml   Output 3095 ml   Net -291.53 ml        PHYSICAL EXAM:  General:  no acute distress    EENT:  EOMI. Anicteric sclerae. MMM  Resp:  CTA bilaterally, no wheezing or rales.   No accessory muscle use  CV:  Regular  rhythm,  No edema  GI:  Soft, Non distended, Non tender.  +Bowel sounds  Neurologic:  Sedated   Psych:   . Not anxious nor agitated  Skin:  No rashes. No jaundice    Reviewed most current lab test results and cultures  YES  Reviewed most current radiology test results   YES  Review and summation of old records today    NO  Reviewed patient's current orders and MAR    YES  PMH/SH reviewed - no change compared to H&P  ________________________________________________________________________  Care Plan discussed with:    Comments   Patient x    Family      RN x    Care Manager     Consultant                        Multidiciplinary team rounds were held today with , nursing, pharmacist and clinical coordinator. Patient's plan of care was discussed; medications were reviewed and discharge planning was addressed. ________________________________________________________________________  Total NON critical care TIME:  35  Minutes    Total CRITICAL CARE TIME Spent:   Minutes non procedure based      Comments   >50% of visit spent in counseling and coordination of care     ________________________________________________________________________  Bianca Garibay MD     Procedures: see electronic medical records for all procedures/Xrays and details which were not copied into this note but were reviewed prior to creation of Plan. LABS:  I reviewed today's most current labs and imaging studies.   Pertinent labs include:  Recent Labs     04/09/19 0418 04/08/19  0454 04/07/19  1545 04/07/19  0413   WBC 9.9 10.9  --  14.8*   HGB 12.0 10.9* 11.7 11.5   HCT 34.9* 31.8* 34.2* 34.1*    270  --  304     Recent Labs     04/09/19 0418 04/08/19  0454 04/07/19  1545 04/07/19  0413    139  --  141   K 3.4* 3.0*  --  3.0*   * 112*  --  115*   CO2 24 17*  --  17*   * 105*  --  93   BUN 2* 5*  --  5*   CREA 0.35* 0.43*  --  0.51*   CA 8.3* 7.8*  --  7.7*   MG  --  2.1  --  2.3   PHOS  --  3.0  --  3.1   ALB 2.9*  --   --   --    TBILI 0.4  --   --   --    SGOT 37  --   --   --    ALT 15  --   --   --    INR  --   -- 1.1  --        Signed: Abelardo Lee MD

## 2019-04-10 VITALS
OXYGEN SATURATION: 98 % | RESPIRATION RATE: 20 BRPM | BODY MASS INDEX: 27.75 KG/M2 | HEIGHT: 62 IN | DIASTOLIC BLOOD PRESSURE: 87 MMHG | SYSTOLIC BLOOD PRESSURE: 125 MMHG | TEMPERATURE: 98.2 F | WEIGHT: 150.8 LBS | HEART RATE: 90 BPM

## 2019-04-10 LAB
GLUCOSE BLD STRIP.AUTO-MCNC: 100 MG/DL (ref 65–100)
GLUCOSE BLD STRIP.AUTO-MCNC: 115 MG/DL (ref 65–100)
OXCARBAZEPINE SERPL-MCNC: 13 UG/ML (ref 10–35)
POTASSIUM SERPL-SCNC: 3.1 MMOL/L (ref 3.5–5.1)
SERVICE CMNT-IMP: ABNORMAL
SERVICE CMNT-IMP: NORMAL

## 2019-04-10 PROCEDURE — 97165 OT EVAL LOW COMPLEX 30 MIN: CPT

## 2019-04-10 PROCEDURE — 74011250637 HC RX REV CODE- 250/637: Performed by: INTERNAL MEDICINE

## 2019-04-10 PROCEDURE — 97161 PT EVAL LOW COMPLEX 20 MIN: CPT | Performed by: PHYSICAL THERAPIST

## 2019-04-10 PROCEDURE — 82962 GLUCOSE BLOOD TEST: CPT

## 2019-04-10 PROCEDURE — 36415 COLL VENOUS BLD VENIPUNCTURE: CPT

## 2019-04-10 PROCEDURE — 84132 ASSAY OF SERUM POTASSIUM: CPT

## 2019-04-10 PROCEDURE — 97116 GAIT TRAINING THERAPY: CPT | Performed by: PHYSICAL THERAPIST

## 2019-04-10 RX ORDER — ACETAMINOPHEN 325 MG/1
650 TABLET ORAL
Status: DISCONTINUED | OUTPATIENT
Start: 2019-04-10 | End: 2019-04-10 | Stop reason: HOSPADM

## 2019-04-10 RX ORDER — LEVETIRACETAM 1000 MG/1
1000 TABLET ORAL 2 TIMES DAILY
Qty: 60 TAB | Refills: 1 | Status: SHIPPED | OUTPATIENT
Start: 2019-04-10 | End: 2019-04-10

## 2019-04-10 RX ORDER — LEVETIRACETAM 1000 MG/1
1000 TABLET ORAL 2 TIMES DAILY
Qty: 60 TAB | Refills: 1 | Status: SHIPPED | OUTPATIENT
Start: 2019-04-10 | End: 2019-04-18

## 2019-04-10 RX ORDER — PANTOPRAZOLE SODIUM 40 MG/1
40 TABLET, DELAYED RELEASE ORAL
Qty: 30 TAB | Refills: 1 | Status: SHIPPED | OUTPATIENT
Start: 2019-04-11 | End: 2019-04-10

## 2019-04-10 RX ORDER — PANTOPRAZOLE SODIUM 40 MG/1
40 TABLET, DELAYED RELEASE ORAL
Qty: 30 TAB | Refills: 1 | Status: SHIPPED | OUTPATIENT
Start: 2019-04-11 | End: 2019-09-20 | Stop reason: SDUPTHER

## 2019-04-10 RX ADMIN — ACETAMINOPHEN 650 MG: 325 TABLET ORAL at 01:58

## 2019-04-10 RX ADMIN — PANTOPRAZOLE SODIUM 40 MG: 40 TABLET, DELAYED RELEASE ORAL at 09:55

## 2019-04-10 RX ADMIN — Medication 10 ML: at 03:34

## 2019-04-10 RX ADMIN — LEVETIRACETAM 1000 MG: 500 TABLET ORAL at 09:55

## 2019-04-10 NOTE — PROGRESS NOTES
Patient seen by OT. She has no acute OT needs and does not need OT followup at discharge. Eval to follow.

## 2019-04-10 NOTE — PROGRESS NOTES
RAPID RESPONSE TEAM    Rounded on patient due to recent transfer out of CCU (4/9/19). Discussed with primary RN, Andres Dimas. No acute concerns, VSS, MEWS 1. No RRT interventions indicated at this time. Please call with any questions or concerns.      [facility]  Rapid Response RN  Marlen Kirby

## 2019-04-10 NOTE — PROGRESS NOTES
OCCUPATIONAL THERAPY EVALUATION WITH DISCHARGE  Patient: Marylen Cruz (64 y.o. female)  Date: 4/10/2019  Primary Diagnosis: Status epilepticus ([facility] [address] [G40.901]  Procedure(s) (LRB):  ESOPHAGOGASTRODUODENOSCOPY (EGD) (N/A)  ESOPHAGOGASTRODUODENAL (EGD) BIOPSY (N/A) 2 Days Post-Op   Precautions: fall      ASSESSMENT :  Based on the objective data described below, patient presents with Independent upper body ADLs, Independent lower body ADLs, and Modified independent assist functional mobility. Lives in group home and will get occasional supervision with ADLS but does them on her own. Will be going home with mother for period of time    The following are barriers to ADL independence while in acute care:   - Cognitive and/or behavioral: safety awareness  - Medical condition: none    - Other:   Management of iv pole and lines     Discharge recommendations: None     Equipment recommendations for successful discharge (if) home: none     PLAN :  Further skilled acute occupational therapy services are not indicated at this time. SUBJECTIVE:   Patient stated Can I get this thing out of my hand?     OBJECTIVE DATA SUMMARY:   HISTORY:   Past Medical History:   Diagnosis Date    Brain damage 1983    related to live vaccination for Tdap    Nausea & vomiting     Neurological disorder 1983    Epilepsy    Seizures ([facility] [address]     last sz at 22years old 08/15/2018     Past Surgical History:   Procedure Laterality Date    HX BACK SURGERY      scoliosis rods and pins    HX HEENT Bilateral 1986    eye muscle tightening        Prior Level of Function/Environment/Context: needs intermittent supervision at baseline, rarely uses RW  Expanded or extensive additional review of patient history:     Home Situation  Home Environment: [facility] Name: Good neighbor  One/Two Story Residence: One story  Living Alone: No  Support Systems: Home care staff  Patient Expects to be Discharged to[de-identified] Private residence(with parent)  Current DME Used/Available at Home: Walker, rolling    Hand dominance: Right    EXAMINATION OF PERFORMANCE DEFICITS:  Cognitive/Behavioral Status:  Neurologic State: Alert  Orientation Level: Oriented X4  Cognition: Follows commands             Skin: intact    Edema: none    Hearing: Auditory  Auditory Impairment: None    Vision/Perceptual:    intact  Range of Motion:    AROM: Within functional limits                         Strength:    Strength: Within functional limits                Coordination:        intact       Tone & Sensation:  Normal and intact                            Balance:  Sitting: Intact  Standing: Intact    Functional Mobility and Transfers for ADLs:  Bed Mobility:       Transfers:  Sit to Stand: Modified independent  Stand to Sit: Modified independent  Bathroom Mobility: Modified independent  Toilet Transfer : Modified independent  Shower Transfer: Stand-by assistance    ADL Assessment:  Feeding: Independent    Oral Facial Hygiene/Grooming: Independent    Bathing: Supervision    Upper Body Dressing: Independent    Lower Body Dressing: Independent    Toileting: Modified independent                ADL Intervention and task modifications:                                     Functional Measure:  Barthel Index:    Bathin  Bladder: 10  Bowels: 10  Groomin  Dressing: 10  Feeding: 10  Mobility: 15  Stairs: 10  Toilet Use: 10  Transfer (Bed to Chair and Back): 15  Total: 95/100        Percentage of impairment   0%   1-19%   20-39%   40-59%   60-79%   80-99%   100%   Barthel Score 0-100 100 99-80 79-60 59-40 20-39 1-19   0     The Barthel ADL Index: Guidelines  1. The index should be used as a record of what a patient does, not as a record of what a patient could do. 2. The main aim is to establish degree of independence from any help, physical or verbal, however minor and for whatever reason. 3. The need for supervision renders the patient not independent.   4. A patient's performance should be established using the best available evidence. Asking the patient, friends/relatives and nurses are the usual sources, but direct observation and common sense are also important. However direct testing is not needed. 5. Usually the patient's performance over the preceding 24-48 hours is important, but occasionally longer periods will be relevant. 6. Middle categories imply that the patient supplies over 50 per cent of the effort. 7. Use of aids to be independent is allowed. Harlee Cowden., Barthel, D.W. (6237). Functional evaluation: the Barthel Index. [address] [facility] (14)2. Humberto Quintana mehdi [place] Cheung, Gregorio West., Jt Wilkins., [place], [address] [facility] (1999). Measuring the change indisability after inpatient rehabilitation; comparison of the responsiveness of the Barthel Index and Functional Hartley Measure. Journal of Neurology, Neurosurgery, and Psychiatry, 66(4), 809-198. Dianna Vega, N.J.A, NANCY Chu, & Jose Alfredo Vargas M.A. (2004.) Assessment of post-stroke quality of life in cost-effectiveness studies: The usefulness of the Barthel Index and the EuroQoL-5D. Quality of Life Research, 15, 514-97       Occupational Therapy Evaluation Charge Determination   History Examination Decision-Making   LOW Complexity : Brief history review  LOW Complexity : 1-3 performance deficits relating to physical, cognitive , or psychosocial skils that result in activity limitations and / or participation restrictions  LOW Complexity : No comorbidities that affect functional and no verbal or physical assistance needed to complete eval tasks       Based on the above components, the patient evaluation is determined to be of the following complexity level: LOW   Pain:  Pre treatment: 0 /10   Post treatment:  0/10     Activity Tolerance:   normal  Please refer to the flowsheet for vital signs taken during this treatment.     After treatment patient left:   Up in chair  Bed/Chair-wheels locked   Chair alarm  COMMUNICATION/EDUCATION:   The patients evaluation was discussed with: Physical Therapist and .     Thank you for this referral.  Esperanza Strong  Time Calculation: 8 mins

## 2019-04-10 NOTE — PROGRESS NOTES
Problem: Pressure Injury - Risk of  Goal: *Prevention of pressure injury  Description  Document Zachariah Scale and appropriate interventions in the flowsheet.   4/10/2019 0229 by Yoselin Mckeon RN  Outcome: Progressing Towards Goal  4/10/2019 0227 by Yoselin Mckeon RN  Outcome: Progressing Towards Goal     Problem: Patient Education: Go to Patient Education Activity  Goal: Patient/Family Education  4/10/2019 0229 by Yoselin Mckeon RN  Outcome: Progressing Towards Goal  4/10/2019 0227 by Yoselin Mckeon RN  Outcome: Progressing Towards Goal     Problem: Breathing Pattern - Ineffective  Goal: *Absence of hypoxia  4/10/2019 0229 by Yoselin Mckeon RN  Outcome: Progressing Towards Goal  4/10/2019 0227 by Yoselin Mckeon RN  Outcome: Progressing Towards Goal  Goal: *Use of effective breathing techniques  4/10/2019 0229 by Yoselin Mckeon RN  Outcome: Progressing Towards Goal  4/10/2019 0227 by Yoselin Mckeon RN  Outcome: Progressing Towards Goal     Problem: Patient Education: Go to Patient Education Activity  Goal: Patient/Family Education  4/10/2019 0229 by Yoselin Mckeon RN  Outcome: Progressing Towards Goal  4/10/2019 0227 by Yoselin Mckeon RN  Outcome: Progressing Towards Goal     Problem: Ventilator Management  Goal: *Adequate oxygenation and ventilation  4/10/2019 0229 by Yoselin Mckeon RN  Outcome: Progressing Towards Goal  4/10/2019 0227 by Yoselin Mckeon RN  Outcome: Progressing Towards Goal  Goal: *Patient maintains clear airway/free of aspiration  4/10/2019 0229 by Yoselin Mckeon RN  Outcome: Progressing Towards Goal  4/10/2019 0227 by Yoselin Mckeon RN  Outcome: Progressing Towards Goal  Goal: *Absence of infection signs and symptoms  4/10/2019 0229 by Yoselin Mckeon RN  Outcome: Progressing Towards Goal  4/10/2019 0227 by Yoselin Mckeon RN  Outcome: Progressing Towards Goal  Goal: *Normal spontaneous ventilation  4/10/2019 0229 by Kristal Murray RN  Outcome: Progressing Towards Goal  4/10/2019 0227 by Kristal Murray RN  Outcome: Progressing Towards Goal     Problem: Non-Violent Restraints  Goal: *Removal from restraints as soon as assessed to be safe  4/10/2019 0229 by Kristal Murray RN  Outcome: Progressing Towards Goal  4/10/2019 0227 by Kristal Murray RN  Outcome: Progressing Towards Goal  Goal: *No harm/injury to patient while restraints in use  4/10/2019 0229 by Kristal Murray RN  Outcome: Progressing Towards Goal  4/10/2019 0227 by Kristal Murray RN  Outcome: Progressing Towards Goal  Goal: *Patient's dignity will be maintained  4/10/2019 0229 by Kristal Murray RN  Outcome: Progressing Towards Goal  4/10/2019 0227 by Kristal Murray RN  Outcome: Progressing Towards Goal  Goal: *Patient Specific Goal (EDIT GOAL, INSERT TEXT)  4/10/2019 0229 by Kristal Murray RN  Outcome: Progressing Towards Goal  4/10/2019 0227 by Kristal Murray RN  Outcome: Progressing Towards Goal  Goal: Non-violent Restaints:Standard Interventions  4/10/2019 0229 by Kristal Murray RN  Outcome: Progressing Towards Goal  4/10/2019 0227 by Kristal Murray RN  Outcome: Progressing Towards Goal  Goal: Non-violent Restraints:Patient Interventions  4/10/2019 0229 by Kristal Murray RN  Outcome: Progressing Towards Goal  4/10/2019 0227 by Kristal Murray RN  Outcome: Progressing Towards Goal  Goal: Patient/Family Education  4/10/2019 0229 by Kristal Murray RN  Outcome: Progressing Towards Goal  4/10/2019 0227 by Kristal Murray RN  Outcome: Progressing Towards Goal     Problem: Falls - Risk of  Goal: *Absence of Falls  Description  Document Liliana Durant Fall Risk and appropriate interventions in the flowsheet.   4/10/2019 0229 by Kristal Murray RN  Outcome: Progressing Towards Goal  4/10/2019 0227 by Kristal Murray RN  Outcome: Progressing Towards Goal     Problem: Patient Education: Go to Patient Education Activity  Goal: Patient/Family Education  4/10/2019 0229 by Yaneth Kaplan RN  Outcome: Progressing Towards Goal  4/10/2019 0227 by Yaneth Kaplan RN  Outcome: Progressing Towards Goal     Problem: Seizure Disorder (Adult)  Goal: *STG: Remains free of seizure activity  4/10/2019 0229 by Yaneth Kaplan RN  Outcome: Progressing Towards Goal  4/10/2019 0227 by Yaneth Kaplan RN  Outcome: Progressing Towards Goal  Goal: *STG: Maintains lab values within therapeutic range  4/10/2019 0229 by Yaneth Kaplan RN  Outcome: Progressing Towards Goal  4/10/2019 0227 by Yaneth Kaplan RN  Outcome: Progressing Towards Goal  Goal: *STG/LTG: Complies with medication therapy  4/10/2019 0229 by Yaneth Kaplan RN  Outcome: Progressing Towards Goal  4/10/2019 0227 by Yaneth Kaplan RN  Outcome: Progressing Towards Goal  Goal: *STG: Remains free of injury during seizure activity  4/10/2019 0229 by Yaneth Kaplan RN  Outcome: Progressing Towards Goal  4/10/2019 0227 by Yaneth Kaplan RN  Outcome: Progressing Towards Goal  Goal: *STG: Remains safe in hospital  4/10/2019 0229 by Yaneth Kaplan RN  Outcome: Progressing Towards Goal  4/10/2019 0227 by Yaneth Kaplan RN  Outcome: Progressing Towards Goal  Goal: Interventions  4/10/2019 0229 by Yaneth Kaplan RN  Outcome: Progressing Towards Goal  4/10/2019 0227 by Yaneth Kaplan RN  Outcome: Progressing Towards Goal     Problem: Patient Education: Go to Patient Education Activity  Goal: Patient/Family Education  4/10/2019 0229 by Yaneth Kaplan RN  Outcome: Progressing Towards Goal  4/10/2019 0227 by Yaneth Kaplan RN  Outcome: Progressing Towards Goal

## 2019-04-10 NOTE — PROGRESS NOTES
physical Therapy EVALUATION/DISCHARGE  Patient: Mary Lundy (85 y.o. female)  Date: 4/10/2019  Primary Diagnosis: Status epilepticus ([facility] [address] [G40.901]  Procedure(s) (LRB):  ESOPHAGOGASTRODUODENOSCOPY (EGD) (N/A)  ESOPHAGOGASTRODUODENAL (EGD) BIOPSY (N/A) 2 Days Post-Op   Precautions:      ASSESSMENT :  Based on the objective data described below, the patient presents with near baseline level of function at this time. Patient is moving well and is independent with mobility at this time. Able to come to stand and ambulate down entire length of hallway without any deviation or LOB. Mom present and confirms that patient is at her baseline. She does not require any skilled HH PT at this time and mom agrees. Further skilled acute physical therapy is not indicated at this time. PLAN :  Discharge Recommendations: None  Further Equipment Recommendations for Discharge: none     SUBJECTIVE:   Patient stated I like to walk. 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 Can enter via ramp or up/down 2 steps. Amb without a RW but mother states that patient does have one already.   Has assistance with some ADL's but is very capable per mother    Personal factors and/or comorbidities impacting plan of care:     Home Situation  Home Environment: [facility] Name: Good neighbor  One/Two Story Residence: One story  Living Alone: No  Support Systems: Home care staff  Patient Expects to be Discharged to[de-identified] Private residence(with parent)  Current DME Used/Available at Home: Walker, rolling    EXAMINATION/PRESENTATION/DECISION MAKING:   Critical Behavior:  Neurologic State: Alert  Orientation Level: Oriented X4  Cognition: Follows commands     Hearing: Auditory  Auditory Impairment: None    Range Of Motion:  AROM: Within functional limits                       Strength:    Strength: Within functional limits                    Tone & Sensation:                                  Coordination:     Vision:      Functional Mobility:  Bed Mobility:   not tested           Transfers:  Sit to Stand: Modified independent  Stand to Sit: Modified independent                       Balance:   Sitting: Intact  Standing: Intact  Ambulation/Gait Training:  Distance (ft): 250 Feet (ft)     Ambulation - Level of Assistance: Modified independent     Gait Description (WDL): Exceptions to WDL           Base of Support: Widened     Speed/Val: Accelerated         Pain:  Pain Scale 1: Numeric (0 - 10)  Pain Intensity 1: 0     Activity Tolerance:   VSS  Please refer to the flowsheet for vital signs taken during this treatment. After treatment:   [x]   Patient left in no apparent distress sitting up in chair  []   Patient left in no apparent distress in bed  [x]   Call bell left within reach  [x]   Nursing notified  [x]   Caregiver present  [x]   Chair alarm activated    COMMUNICATION/EDUCATION:   Communication/Collaboration:  [x]   Fall prevention education was provided and the patient/caregiver indicated understanding. [x]   Patient/family have participated as able and agree with findings and recommendations. []   Patient is unable to participate in plan of care at this time.   Findings and recommendations were discussed with: Physical Therapist, Occupational Therapist and Registered Nurse    Thank you for this referral.  Roxane Howard, PT, DPT   Time Calculation: 15 mins

## 2019-04-10 NOTE — PROGRESS NOTES
Bedside shift change report given to [address]iel [address] (oncoming nurse) by Alia Wild RN (offgoing nurse). Report included the following information SBAR, Kardex, Intake/Output and Recent Results. Zone Phone:   [phone]      Significant changes during shift:  Patient was admitted to our unit. Patient came from the CCU with an infiltrated IV and a new IV was placed. Patient Information    Robinson Ashley  37 y.o.  4/6/2019  9:56 AM by Alfred Mercado MD. 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 live vaccination for Tdap    Nausea & vomiting     Neurological disorder 1983    Epilepsy    Seizures ([facility] [address]     last sz at 22years old 08/15/2018         Core Measures:    CVA: No No  CHF:No No  PNA:No No    Post Op Surgical (If Applicable):     Number times ambulated in hallway past shift:  0  Number of times OOB to chair past shift:   4  NG Tube: No  Incentive Spirometer: No  Drains: No    Dressing Present:  No  Flatus:  No    Activity Status:    OOB to Chair Yes  Ambulated this shift Yes   Bed Rest No    Supplemental O2: (If Applicable)    NC No  NRB No  Venti-mask No    LINES AND DRAINS:    Central Line? No     PICC LINE? No    Urinary Catheter? No     DVT prophylaxis:    DVT prophylaxis Med- No  DVT prophylaxis SCD or BRENDON- Yes     Wounds: (If Applicable)    Wounds- No    Patient Safety:    Falls Score Total Score: 4  Safety Level_______  Bed Alarm On? Yes  Sitter?  No    Plan for upcoming shift: Complete tests, patient wants a shower in the morning, and potential discharge      Discharge Plan: Yes plans to go home with mom for 5 days and then return back to group home    Active Consults:  [address]

## 2019-04-10 NOTE — PROGRESS NOTES
Problem: Pressure Injury - Risk of  Goal: *Prevention of pressure injury  Description  Document Zachariah Scale and appropriate interventions in the flowsheet. Outcome: Progressing Towards Goal     Problem: Patient Education: Go to Patient Education Activity  Goal: Patient/Family Education  Outcome: Progressing Towards Goal     Problem: Breathing Pattern - Ineffective  Goal: *Absence of hypoxia  Outcome: Progressing Towards Goal  Goal: *Use of effective breathing techniques  Outcome: Progressing Towards Goal     Problem: Patient Education: Go to Patient Education Activity  Goal: Patient/Family Education  Outcome: Progressing Towards Goal     Problem: Ventilator Management  Goal: *Adequate oxygenation and ventilation  Outcome: Progressing Towards Goal  Goal: *Patient maintains clear airway/free of aspiration  Outcome: Progressing Towards Goal  Goal: *Absence of infection signs and symptoms  Outcome: Progressing Towards Goal  Goal: *Normal spontaneous ventilation  Outcome: Progressing Towards Goal     Problem: Non-Violent Restraints  Goal: *Removal from restraints as soon as assessed to be safe  Outcome: Progressing Towards Goal  Goal: *No harm/injury to patient while restraints in use  Outcome: Progressing Towards Goal  Goal: *Patient's dignity will be maintained  Outcome: Progressing Towards Goal  Goal: *Patient Specific Goal (EDIT GOAL, INSERT TEXT)  Outcome: Progressing Towards Goal  Goal: Non-violent Restaints:Standard Interventions  Outcome: Progressing Towards Goal  Goal: Non-violent Restraints:Patient Interventions  Outcome: Progressing Towards Goal  Goal: Patient/Family Education  Outcome: Progressing Towards Goal     Problem: Falls - Risk of  Goal: *Absence of Falls  Description  Document Mathew Fall Risk and appropriate interventions in the flowsheet.   Outcome: Progressing Towards Goal     Problem: Patient Education: Go to Patient Education Activity  Goal: Patient/Family Education  Outcome: Progressing Towards Goal     Problem: Seizure Disorder (Adult)  Goal: *STG: Remains free of seizure activity  Outcome: Progressing Towards Goal  Goal: *STG: Maintains lab values within therapeutic range  Outcome: Progressing Towards Goal  Goal: *STG/LTG: Complies with medication therapy  Outcome: Progressing Towards Goal  Goal: *STG: Remains free of injury during seizure activity  Outcome: Progressing Towards Goal  Goal: *STG: Remains safe in hospital  Outcome: Progressing Towards Goal  Goal: Interventions  Outcome: Progressing Towards Goal     Problem: Patient Education: Go to Patient Education Activity  Goal: Patient/Family Education  Outcome: Progressing Towards Goal

## 2019-04-10 NOTE — PROGRESS NOTES
Bedside shift change report given to Claire (oncoming nurse) by [place] [id number] (offgoing nurse). Report included the following information SBAR, Kardex, Intake/Output and Recent Results.     Zone Phone:   [phone]        Significant changes during shift:  pt with complaints of abdominal pain relieved by tylenol        Patient Information     Arleen Hernandez  37 y.o.  4/6/2019  9:56 AM by David Purcell MD. Arleen Hernandez was admitted from Adult/Group Home     Problem List          Patient Active Problem List     Diagnosis Date Noted    Status epilepticus ([facility] [address] 04/06/2019           Past Medical History:   Diagnosis Date    Brain damage 1983     related to live vaccination for Tdap    Nausea & vomiting      Neurological disorder 1983     Epilepsy    Seizures ([facility] [address]       last sz at 22years old 08/15/2018            Core Measures:     CVA: No No  CHF:No No  PNA:No No     Post Op Surgical (If Applicable):      Number times ambulated in hallway past shift:  0  Number of times OOB to chair past shift:   4  NG Tube: No  Incentive Spirometer: No  Drains: No    Dressing Present:  No  Flatus:  No     Activity Status:     OOB to Chair Yes  Ambulated this shift Yes   Bed Rest No     Supplemental O2: (If Applicable)     NC No  NRB No  Venti-mask No     LINES AND DRAINS:     Central Line? No      PICC LINE? No     Urinary Catheter? No      DVT prophylaxis:     DVT prophylaxis Med- No  DVT prophylaxis SCD or BRENDON- Yes      Wounds: (If Applicable)     Wounds- No     Patient Safety:     Falls Score Total Score: 4  Safety Level_______  Bed Alarm On? Yes  Sitter?  No     Plan for upcoming shift: None, test complete, PT/OT        Discharge Plan: Yes, plans to go home with mom for 5 days and then return back to group home     Active Consults:  IP CONSULT TO NEUROLOGY  IP CONSULT TO GASTROENTEROLOGY

## 2019-04-10 NOTE — PROGRESS NOTES
Problem: Pressure Injury - Risk of  Goal: *Prevention of pressure injury  Description  Document Zachariah Scale and appropriate interventions in the flowsheet. Outcome: Progressing Towards Goal     Problem: Patient Education: Go to Patient Education Activity  Goal: Patient/Family Education  Outcome: Progressing Towards Goal     Problem: Breathing Pattern - Ineffective  Goal: *Absence of hypoxia  Outcome: Progressing Towards Goal  Goal: *Use of effective breathing techniques  Outcome: Progressing Towards Goal     Problem: Patient Education: Go to Patient Education Activity  Goal: Patient/Family Education  Outcome: Progressing Towards Goal     Problem: Ventilator Management  Goal: *Adequate oxygenation and ventilation  Outcome: Progressing Towards Goal  Goal: *Patient maintains clear airway/free of aspiration  Outcome: Progressing Towards Goal  Goal: *Absence of infection signs and symptoms  Outcome: Progressing Towards Goal  Goal: *Normal spontaneous ventilation  Outcome: Progressing Towards Goal     Problem: Non-Violent Restraints  Goal: *Removal from restraints as soon as assessed to be safe  Outcome: Progressing Towards Goal  Goal: *No harm/injury to patient while restraints in use  Outcome: Progressing Towards Goal  Goal: *Patient's dignity will be maintained  Outcome: Progressing Towards Goal  Goal: *Patient Specific Goal (EDIT GOAL, INSERT TEXT)  Outcome: Progressing Towards Goal  Goal: Non-violent Restaints:Standard Interventions  Outcome: Progressing Towards Goal  Goal: Non-violent Restraints:Patient Interventions  Outcome: Progressing Towards Goal  Goal: Patient/Family Education  Outcome: Progressing Towards Goal     Problem: Falls - Risk of  Goal: *Absence of Falls  Description  Document Mathew Fall Risk and appropriate interventions in the flowsheet.   Outcome: Progressing Towards Goal     Problem: Patient Education: Go to Patient Education Activity  Goal: Patient/Family Education  Outcome: Progressing Towards Goal

## 2019-04-10 NOTE — DISCHARGE SUMMARY
Hospitalist Discharge Summary     Patient ID:  Judy Grande  [id number]  15 y.o.  1985    PCP on record: Shana Monterroso MD    Admit date: 4/6/2019  Discharge date and time: 4/10/2019      DISCHARGE DIAGNOSIS:    Status Epilepticus   History of Epilepsy  Leucocytosis most likely reactive   Lactic acidosis most likely post seizure : lactic acid>16  GI bleed   West syndrome  Mental retardation          CONSULTATIONS:  IP CONSULT TO NEUROLOGY  IP CONSULT TO GASTROENTEROLOGY    Excerpted HPI from H&P of Sangita Muir MD:  Camilo Craft is a 37 y.o.  female with known history as listed below presents to ED with complaint noted above. Available records were reviewed at the time of H&P. This is a 33-year-old female with past medical history of west  syndrome since childhood, epilepsy, mental retardation, scoliosis status post rods in her spine who was sent to the ED for recurrent seizures. Bro Cleaning lives in a group home, most of the HPI obtained from mom at bedside and from the ED physician. Bro Cleaning reportedly had 2 seizure-like activity in his group home, followed by another one in the ambulance and a fourth  while in the ED.  After her back to back seizures, patient remained postictal.  Patient usually takes Trileptal for epilepsy . Her  Trileptal has been decreased in  Last December from 600 mg twice daily to 300 mg twice daily by her neurologist as she has been seizure free for the  last 14 years. In the ED, patient was intubated and ventilated. Hugo Narayanan of her labs showed elevated white count 18 K, elevated lactic acidosis  Patient was given 1 g of Keppra in the ED . During transport to CT , pt vomited blood . gastroccult+  We were asked to see for work up and evaluation of the above problems.          ______________________________________________________________________  DISCHARGE SUMMARY/HOSPITAL COURSE:  for full details see H&P, daily progress notes, labs, consult notes. Status Epilepticus   -s/p extubation 4/8  -doing well, seen by PT/OT, no needs  History of Epilepsy  Appreciate input and guidance from neurology  [facility], resolved   Lactic acidosis most likely post seizure : lactic acid>16  No symptoms of infection  UA neg , chest xray wnl      GI bleed   Pt had an episode of bloody emesis while going to CT scan   Hb stable, EGD revealed gastric erosions and ulcer, possbly related to tube trauma. H. Pylori negative. Low risk of rebleeding. Keep on PO PPI for 8 weeks.     Initially suspected to have DM2   Hgb A1c is 5.7     West syndrome  Mental retardation      lives in a group home  NOK : Sundeep Zaldivar, [phone]  Code Status: Full Code  DVT Prophylaxis: SCD's  GI Prophylaxis: PPI  _______________________________________________________________________  Patient seen and examined by me on discharge day. Pertinent Findings:  Gen:    Not in distress  Chest: Clear lungs  CVS:   Regular rhythm. No edema  Abd:  Soft, not distended, not tender  Neuro:  Alert, MS back to baseline  _______________________________________________________________________  DISCHARGE MEDICATIONS:   Current Discharge Medication List      START taking these medications    Details   levETIRAcetam 1,000 mg tablet Take 1 Tab by mouth two (2) times a day. Qty: 60 Tab, Refills: 1      pantoprazole (PROTONIX) 40 mg tablet Take 1 Tab by mouth Daily (before breakfast). Qty: 30 Tab, Refills: 1         STOP taking these medications       OXcarbazepine (TRILEPTAL) 300 mg tablet Comments:   Reason for Stopping:               My Recommended Diet, Activity, Wound Care, and follow-up labs are listed in the patient's Discharge Insturctions which I have personally completed and reviewed.     ______________________________________________________________________    Risk of deterioration: Moderate    Condition at Discharge: Stable  ______________________________________________________________________    Disposition  Back to group home  ______________________________________________________________________    Care Plan discussed with:   Patient, RN, Care Manager, Consultant    ______________________________________________________________________    Code Status: Full Code  ______________________________________________________________________      Follow up with:   PCP : Aris Gerardo MD  Follow-up Information     Follow up With Specialties Details Why Contact Guillermo Layne MD Neurology  Patients neurologist, mom says this is the doctor patient sees the most.  [address] [place] [address] Drive  [address]  [facility] [place] [address]  [phone]                Total time in minutes spent coordinating this discharge (includes going over instructions, follow-up, prescriptions, and preparing report for sign off to her PCP) :  35 minutes    Signed:  Renea Bloom MD

## 2019-04-10 NOTE — PROGRESS NOTES
Explained second medicare im letter to patient and her mother. They both voiced understanding and opportunity given for questions. Signed copy placed on chart and copy given to patient. Mother in room with patient and states when she is discharged she will be transporting her.

## 2019-04-10 NOTE — PROGRESS NOTES
Discharge instructions including follow up appointments and medications reviewed with patient and mother. They verbalized understanding. Mother stated she would prefer a hardcopy of prescriptions and she is traveling in opposite direction from [place]. Paged Dr Will Sicard. Mother states she has someone to meet, and they are leaving now, and she will come back and  prescription. Discharged walking per patient request.

## 2019-04-10 NOTE — ROUTINE PROCESS
RAPID RESPONSE TEAM     Rounded on patient due to recent transfer out of CCU (4/9/19).    Patient is alert, sitting up in chair, NAD, VSS, denies complaints.     No RRT interventions indicated at this time.      Please call ext. [phone] if needed.

## 2019-04-11 LAB
BACTERIA SPEC CULT: NORMAL
SERVICE CMNT-IMP: NORMAL

## 2019-04-15 ENCOUNTER — HOSPITAL ENCOUNTER (EMERGENCY)
Age: 34
Discharge: HOME | End: 2019-04-16
Attending: EMERGENCY MEDICINE
Payer: MEDICARE

## 2019-04-15 VITALS
RESPIRATION RATE: 14 BRPM | HEART RATE: 98 BPM | WEIGHT: 150.79 LBS | OXYGEN SATURATION: 95 % | DIASTOLIC BLOOD PRESSURE: 84 MMHG | SYSTOLIC BLOOD PRESSURE: 121 MMHG | TEMPERATURE: 98.2 F | BODY MASS INDEX: 27.75 KG/M2 | HEIGHT: 62 IN

## 2019-04-15 DIAGNOSIS — G40.919 BREAKTHROUGH SEIZURE (HCC): Primary | ICD-10-CM

## 2019-04-15 DIAGNOSIS — E87.6 ACUTE HYPOKALEMIA: ICD-10-CM

## 2019-04-15 DIAGNOSIS — S01.01XA LACERATION OF SCALP, INITIAL ENCOUNTER: ICD-10-CM

## 2019-04-15 DIAGNOSIS — N39.0 URINARY TRACT INFECTION WITHOUT HEMATURIA, SITE UNSPECIFIED: ICD-10-CM

## 2019-04-15 PROCEDURE — 77030008460 HC STPLR SKN PRECIS 3M -A

## 2019-04-15 PROCEDURE — 85025 COMPLETE CBC W/AUTO DIFF WBC: CPT

## 2019-04-15 PROCEDURE — 80053 COMPREHEN METABOLIC PANEL: CPT

## 2019-04-15 PROCEDURE — 99284 EMERGENCY DEPT VISIT MOD MDM: CPT

## 2019-04-15 PROCEDURE — 80307 DRUG TEST PRSMV CHEM ANLYZR: CPT

## 2019-04-15 PROCEDURE — 36415 COLL VENOUS BLD VENIPUNCTURE: CPT

## 2019-04-15 PROCEDURE — 80177 DRUG SCRN QUAN LEVETIRACETAM: CPT

## 2019-04-15 PROCEDURE — 75810000293 HC SIMP/SUPERF WND  RPR

## 2019-04-15 PROCEDURE — 77030018836 HC SOL IRR NACL ICUM -A

## 2019-04-15 RX ORDER — ACETAMINOPHEN 325 MG/1
650 TABLET ORAL
Status: COMPLETED | OUTPATIENT
Start: 2019-04-15 | End: 2019-04-16

## 2019-04-15 RX ORDER — LEVETIRACETAM 10 MG/ML
1000 INJECTION INTRAVASCULAR ONCE
Status: COMPLETED | OUTPATIENT
Start: 2019-04-15 | End: 2019-04-16

## 2019-04-16 ENCOUNTER — APPOINTMENT (OUTPATIENT)
Dept: CT IMAGING | Age: 34
End: 2019-04-16
Attending: EMERGENCY MEDICINE
Payer: MEDICARE

## 2019-04-16 LAB
ALBUMIN SERPL-MCNC: 3.6 G/DL (ref 3.5–5)
ALBUMIN/GLOB SERPL: 0.9 {RATIO} (ref 1.1–2.2)
ALP SERPL-CCNC: 66 U/L (ref 45–117)
ALT SERPL-CCNC: 27 U/L (ref 12–78)
AMPHET UR QL SCN: NEGATIVE
ANION GAP SERPL CALC-SCNC: 8 MMOL/L (ref 5–15)
APPEARANCE UR: ABNORMAL
AST SERPL-CCNC: 23 U/L (ref 15–37)
BACTERIA URNS QL MICRO: ABNORMAL /HPF
BARBITURATES UR QL SCN: NEGATIVE
BASOPHILS # BLD: 0.1 K/UL (ref 0–0.1)
BASOPHILS NFR BLD: 0 % (ref 0–1)
BENZODIAZ UR QL: NEGATIVE
BILIRUB SERPL-MCNC: 0.3 MG/DL (ref 0.2–1)
BILIRUB UR QL: NEGATIVE
BUN SERPL-MCNC: 6 MG/DL (ref 6–20)
BUN/CREAT SERPL: 11 (ref 12–20)
CALCIUM SERPL-MCNC: 8.6 MG/DL (ref 8.5–10.1)
CANNABINOIDS UR QL SCN: NEGATIVE
CHLORIDE SERPL-SCNC: 105 MMOL/L (ref 97–108)
CO2 SERPL-SCNC: 24 MMOL/L (ref 21–32)
COCAINE UR QL SCN: NEGATIVE
COLOR UR: ABNORMAL
CREAT SERPL-MCNC: 0.57 MG/DL (ref 0.55–1.02)
DIFFERENTIAL METHOD BLD: ABNORMAL
DRUG SCRN COMMENT,DRGCM: NORMAL
EOSINOPHIL # BLD: 0.1 K/UL (ref 0–0.4)
EOSINOPHIL NFR BLD: 1 % (ref 0–7)
EPITH CASTS URNS QL MICRO: ABNORMAL /LPF
ERYTHROCYTE [DISTWIDTH] IN BLOOD BY AUTOMATED COUNT: 12.6 % (ref 11.5–14.5)
ETHANOL SERPL-MCNC: <10 MG/DL
GLOBULIN SER CALC-MCNC: 3.8 G/DL (ref 2–4)
GLUCOSE SERPL-MCNC: 146 MG/DL (ref 65–100)
GLUCOSE UR STRIP.AUTO-MCNC: NEGATIVE MG/DL
HCG UR QL: NEGATIVE
HCT VFR BLD AUTO: 39 % (ref 35–47)
HGB BLD-MCNC: 13.2 G/DL (ref 11.5–16)
HGB UR QL STRIP: ABNORMAL
HYALINE CASTS URNS QL MICRO: ABNORMAL /LPF (ref 0–5)
IMM GRANULOCYTES # BLD AUTO: 0.1 K/UL (ref 0–0.04)
IMM GRANULOCYTES NFR BLD AUTO: 1 % (ref 0–0.5)
KETONES UR QL STRIP.AUTO: ABNORMAL MG/DL
LEUKOCYTE ESTERASE UR QL STRIP.AUTO: ABNORMAL
LYMPHOCYTES # BLD: 2.1 K/UL (ref 0.8–3.5)
LYMPHOCYTES NFR BLD: 18 % (ref 12–49)
MCH RBC QN AUTO: 29.1 PG (ref 26–34)
MCHC RBC AUTO-ENTMCNC: 33.8 G/DL (ref 30–36.5)
MCV RBC AUTO: 85.9 FL (ref 80–99)
METHADONE UR QL: NEGATIVE
MONOCYTES # BLD: 0.8 K/UL (ref 0–1)
MONOCYTES NFR BLD: 7 % (ref 5–13)
NEUTS SEG # BLD: 8.7 K/UL (ref 1.8–8)
NEUTS SEG NFR BLD: 73 % (ref 32–75)
NITRITE UR QL STRIP.AUTO: NEGATIVE
NRBC # BLD: 0 K/UL (ref 0–0.01)
NRBC BLD-RTO: 0 PER 100 WBC
OPIATES UR QL: NEGATIVE
PCP UR QL: NEGATIVE
PH UR STRIP: 5 [PH] (ref 5–8)
PLATELET # BLD AUTO: 429 K/UL (ref 150–400)
PMV BLD AUTO: 9.3 FL (ref 8.9–12.9)
POTASSIUM SERPL-SCNC: 3.2 MMOL/L (ref 3.5–5.1)
PROT SERPL-MCNC: 7.4 G/DL (ref 6.4–8.2)
PROT UR STRIP-MCNC: ABNORMAL MG/DL
RBC # BLD AUTO: 4.54 M/UL (ref 3.8–5.2)
RBC #/AREA URNS HPF: ABNORMAL /HPF (ref 0–5)
SODIUM SERPL-SCNC: 137 MMOL/L (ref 136–145)
SP GR UR REFRACTOMETRY: 1.02 (ref 1–1.03)
UA: UC IF INDICATED,UAUC: ABNORMAL
UROBILINOGEN UR QL STRIP.AUTO: 0.2 EU/DL (ref 0.2–1)
WBC # BLD AUTO: 11.7 K/UL (ref 3.6–11)
WBC URNS QL MICRO: ABNORMAL /HPF (ref 0–4)

## 2019-04-16 PROCEDURE — 96367 TX/PROPH/DG ADDL SEQ IV INF: CPT

## 2019-04-16 PROCEDURE — 87186 SC STD MICRODIL/AGAR DIL: CPT

## 2019-04-16 PROCEDURE — 87077 CULTURE AEROBIC IDENTIFY: CPT

## 2019-04-16 PROCEDURE — 81025 URINE PREGNANCY TEST: CPT

## 2019-04-16 PROCEDURE — 96365 THER/PROPH/DIAG IV INF INIT: CPT

## 2019-04-16 PROCEDURE — 74011000258 HC RX REV CODE- 258: Performed by: EMERGENCY MEDICINE

## 2019-04-16 PROCEDURE — 74011250636 HC RX REV CODE- 250/636: Performed by: EMERGENCY MEDICINE

## 2019-04-16 PROCEDURE — 74011250637 HC RX REV CODE- 250/637: Performed by: EMERGENCY MEDICINE

## 2019-04-16 PROCEDURE — 80307 DRUG TEST PRSMV CHEM ANLYZR: CPT

## 2019-04-16 PROCEDURE — 70450 CT HEAD/BRAIN W/O DYE: CPT

## 2019-04-16 PROCEDURE — 87086 URINE CULTURE/COLONY COUNT: CPT

## 2019-04-16 PROCEDURE — 81001 URINALYSIS AUTO W/SCOPE: CPT

## 2019-04-16 PROCEDURE — 75810000293 HC SIMP/SUPERF WND  RPR

## 2019-04-16 PROCEDURE — 96366 THER/PROPH/DIAG IV INF ADDON: CPT

## 2019-04-16 RX ORDER — CEPHALEXIN 500 MG/1
500 CAPSULE ORAL 4 TIMES DAILY
Qty: 20 CAP | Refills: 0 | Status: SHIPPED | OUTPATIENT
Start: 2019-04-16 | End: 2019-04-28

## 2019-04-16 RX ORDER — POTASSIUM CHLORIDE 20 MEQ/1
40 TABLET, EXTENDED RELEASE ORAL
Status: COMPLETED | OUTPATIENT
Start: 2019-04-16 | End: 2019-04-16

## 2019-04-16 RX ADMIN — SODIUM CHLORIDE 1000 ML: 900 INJECTION, SOLUTION INTRAVENOUS at 01:02

## 2019-04-16 RX ADMIN — LEVETIRACETAM 1000 MG: 10 INJECTION INTRAVENOUS at 00:46

## 2019-04-16 RX ADMIN — POTASSIUM CHLORIDE 40 MEQ: 20 TABLET, EXTENDED RELEASE ORAL at 02:56

## 2019-04-16 RX ADMIN — CEFTRIAXONE 1 G: 1 INJECTION, POWDER, FOR SOLUTION INTRAMUSCULAR; INTRAVENOUS at 02:56

## 2019-04-16 RX ADMIN — ACETAMINOPHEN 650 MG: 325 TABLET ORAL at 00:46

## 2019-04-16 NOTE — ED PROVIDER NOTES
EMERGENCY DEPARTMENT HISTORY AND PHYSICAL EXAM 
 
 
Date: 4/15/2019 Patient Name: Yoli Guillermo Patient Age and Sex: 37 y.o. female History of Presenting Illness Chief Complaint Patient presents with  Seizure Per EMS, pt had approx 2 min seizure at group home PTA; hx of seizrures-has been compliant with Keppra; pt now ANOx4; pt also struck forehead on dresser during seizure-no notable lac History Provided By: Patient and EMS 
 
HPI: Yoli Guillermo, 37 y.o. female with PMHx significant for epilepsy presents via EMS with complaints of seizure prior to arrival.  Patient is from a residential group home for the mentally handicapped. Patient was noted to have a seizure like activity that was witnessed by another resident. It was described as a grand mal seizure. Patient had fallen out of bed and onto the floor striking the back of her head on the nightstand. The seizure lasted about 2 minutes. Patient reports compliance to her seizure medications including her Keppra. She currently is complaining of 7 out of 10 headache. She denies any chest pain, shortness of breath. Per chart review patient was admitted back in April 10 for status epilepticus, lactic acidosis and possible GI bleed. Per EMS she was initially tachycardic to 123 and improved with IV fluids. She had otherwise stable vitals normal oxygen and normal blood sugar of 119. Per chart review, for her satus epilepticus she did require intubation during her admission. Pt denies any other alleviating or exacerbating factors. Additionally, pt specifically denies any recent fever, chills, nausea, vomiting, diarrhea, abdominal pain, CP, SOB, lightheadedness, dizziness, numbness, weakness, tingling, BLE swelling, heart palpitations, urinary sxs, changes in BM, changes in PO intake, melena, hematochezia, cough, or congestion. PCP: Yael Zayas MD 
 
There are no other complaints, changes or physical findings at this time. Past History Past Medical History: 
Past Medical History:  
Diagnosis Date  Brain damage 1985  
 related to live vaccination for Tdap  Nausea & vomiting  Neurological disorder 1985 Epilepsy  Seizures ([facility] [address]   
 last sz at 22years old 08/15/2018 Past Surgical History: 
Past Surgical History:  
Procedure Laterality Date  HX BACK SURGERY    
 scoliosis rods and pins  HX HEENT Bilateral 1986  
 eye muscle tightening Family History: 
History reviewed. No pertinent family history. Social History: 
Social History Tobacco Use  Smoking status: Never Smoker  Smokeless tobacco: Never Used Substance Use Topics  Alcohol use: No  
 Drug use: Not on file Allergies: Allergies Allergen Reactions  Bees [Sting, Bee] Anaphylaxis  Pertussis Vaccine,Adsorbed Other (comments) Medications: No current facility-administered medications on file prior to encounter. Current Outpatient Medications on File Prior to Encounter Medication Sig Dispense Refill  levETIRAcetam 1,000 mg tablet Take 1 Tab by mouth two (2) times a day. 60 Tab 1  
 pantoprazole (PROTONIX) 40 mg tablet Take 1 Tab by mouth Daily (before breakfast). 30 Tab 1 Review of Systems Review of Systems Constitutional: Negative. Negative for chills and fever. HENT: Negative. Negative for congestion, facial swelling, rhinorrhea, sore throat, trouble swallowing and voice change. Eyes: Negative. Respiratory: Negative. Negative for apnea, cough, chest tightness, shortness of breath and wheezing. Cardiovascular: Negative. Negative for chest pain, palpitations and leg swelling. Gastrointestinal: Negative. Negative for abdominal distention, abdominal pain, blood in stool, constipation, diarrhea, nausea and vomiting. Endocrine: Negative. Negative for cold intolerance, heat intolerance and polyuria. Genitourinary: Negative.   Negative for difficulty urinating, dysuria, flank pain, frequency, hematuria and urgency. Musculoskeletal: Negative. Negative for arthralgias, back pain, myalgias, neck pain and neck stiffness. Skin: Positive for wound. Negative for color change, pallor and rash. Neurological: Positive for seizures. Negative for dizziness, syncope, facial asymmetry, speech difficulty, weakness, light-headedness, numbness and headaches. Hematological: Negative. Does not bruise/bleed easily. Psychiatric/Behavioral: Negative. Negative for confusion and self-injury. The patient is not nervous/anxious. Physical Exam  
Physical Exam  
Constitutional: She is oriented to person, place, and time. She appears well-developed and well-nourished. No distress. HENT:  
Head: Normocephalic. Mouth/Throat: Oropharynx is clear and moist. No oropharyngeal exudate. 1 cm linear scalp laceration on the left occiput. No active bleeding. Eyes: Pupils are equal, round, and reactive to light. Conjunctivae and EOM are normal.  
Neck: Normal range of motion. Cardiovascular: Normal rate, regular rhythm and normal heart sounds. Exam reveals no gallop and no friction rub. No murmur heard. Pulmonary/Chest: Effort normal and breath sounds normal. No respiratory distress. She has no wheezes. She has no rales. She exhibits no tenderness. Abdominal: Soft. Bowel sounds are normal. She exhibits no distension and no mass. There is no tenderness. There is no rebound and no guarding. Musculoskeletal: Normal range of motion. She exhibits no edema, tenderness or deformity. Neurological: She is alert and oriented to person, place, and time. She displays normal reflexes. No cranial nerve deficit. She exhibits normal muscle tone. Coordination normal.  
Skin: Skin is warm. No rash noted. She is not diaphoretic. Psychiatric: She has a normal mood and affect. Nursing note and vitals reviewed. Diagnostic Study Results Labs - Recent Results (from the past 24 hour(s)) CBC WITH AUTOMATED DIFF Collection Time: 04/15/19 11:45 PM  
Result Value Ref Range WBC 11.7 (H) 3.6 - 11.0 K/uL  
 RBC 4.54 3.80 - 5.20 M/uL  
 HGB 13.2 11.5 - 16.0 g/dL HCT 39.0 35.0 - 47.0 % MCV 85.9 80.0 - 99.0 FL  
 MCH 29.1 26.0 - 34.0 PG  
 MCHC 33.8 30.0 - 36.5 g/dL  
 RDW 12.6 11.5 - 14.5 % PLATELET 398 (H) 289 - 400 K/uL MPV 9.3 8.9 - 12.9 FL  
 NRBC 0.0 0  WBC ABSOLUTE NRBC 0.00 0.00 - 0.01 K/uL NEUTROPHILS 73 32 - 75 % LYMPHOCYTES 18 12 - 49 % MONOCYTES 7 5 - 13 % EOSINOPHILS 1 0 - 7 % BASOPHILS 0 0 - 1 % IMMATURE GRANULOCYTES 1 (H) 0.0 - 0.5 % ABS. NEUTROPHILS 8.7 (H) 1.8 - 8.0 K/UL  
 ABS. LYMPHOCYTES 2.1 0.8 - 3.5 K/UL  
 ABS. MONOCYTES 0.8 0.0 - 1.0 K/UL  
 ABS. EOSINOPHILS 0.1 0.0 - 0.4 K/UL  
 ABS. BASOPHILS 0.1 0.0 - 0.1 K/UL  
 ABS. IMM. GRANS. 0.1 (H) 0.00 - 0.04 K/UL  
 DF AUTOMATED METABOLIC PANEL, COMPREHENSIVE Collection Time: 04/15/19 11:45 PM  
Result Value Ref Range Sodium 137 136 - 145 mmol/L Potassium 3.2 (L) 3.5 - 5.1 mmol/L Chloride 105 97 - 108 mmol/L  
 CO2 24 21 - 32 mmol/L Anion gap 8 5 - 15 mmol/L Glucose 146 (H) 65 - 100 mg/dL BUN 6 6 - 20 MG/DL Creatinine 0.57 0.55 - 1.02 MG/DL  
 BUN/Creatinine ratio 11 (L) 12 - 20 GFR est AA >60 >60 ml/min/1.73m2 GFR est non-AA >60 >60 ml/min/1.73m2 Calcium 8.6 8.5 - 10.1 MG/DL Bilirubin, total 0.3 0.2 - 1.0 MG/DL  
 ALT (SGPT) 27 12 - 78 U/L  
 AST (SGOT) 23 15 - 37 U/L Alk. phosphatase 66 45 - 117 U/L Protein, total 7.4 6.4 - 8.2 g/dL Albumin 3.6 3.5 - 5.0 g/dL Globulin 3.8 2.0 - 4.0 g/dL A-G Ratio 0.9 (L) 1.1 - 2.2 HCG URINE, QL. - POC Collection Time: 04/16/19 12:44 AM  
Result Value Ref Range Pregnancy test,urine (POC) NEGATIVE  NEG    
DRUG SCREEN, URINE Collection Time: 04/16/19 12:49 AM  
Result Value Ref Range AMPHETAMINES NEGATIVE  NEG    
 BARBITURATES NEGATIVE  NEG  BENZODIAZEPINES NEGATIVE  NEG    
 COCAINE NEGATIVE  NEG METHADONE NEGATIVE  NEG    
 OPIATES NEGATIVE  NEG    
 PCP(PHENCYCLIDINE) NEGATIVE  NEG    
 THC (TH-CANNABINOL) NEGATIVE  NEG Drug screen comment (NOTE) URINALYSIS W/ REFLEX CULTURE Collection Time: 04/16/19 12:49 AM  
Result Value Ref Range Color YELLOW/STRAW Appearance CLOUDY (A) CLEAR Specific gravity 1.017 1.003 - 1.030    
 pH (UA) 5.0 5.0 - 8.0 Protein TRACE (A) NEG mg/dL Glucose NEGATIVE  NEG mg/dL Ketone TRACE (A) NEG mg/dL Bilirubin NEGATIVE  NEG Blood TRACE (A) NEG Urobilinogen 0.2 0.2 - 1.0 EU/dL Nitrites NEGATIVE  NEG Leukocyte Esterase MODERATE (A) NEG    
 WBC  0 - 4 /hpf  
 RBC 0-5 0 - 5 /hpf Epithelial cells FEW FEW /lpf Bacteria 1+ (A) NEG /hpf  
 UA:UC IF INDICATED URINE CULTURE ORDERED (A) CNI Hyaline cast 2-5 0 - 5 /lpf Radiologic Studies -  
CT HEAD WO CONT Final Result IMPRESSION:   
  
No acute intracranial abnormality on this noncontrast head CT. No change. CT Results  (Last 48 hours) 04/16/19 0032  CT HEAD WO CONT Final result Impression:  IMPRESSION:   
   
No acute intracranial abnormality on this noncontrast head CT. No change. Narrative:  EXAM: CT HEAD WO CONT INDICATION: Forehead injury during seizure today. Epilepsy. COMPARISON: CT head on 4/6/2019 TECHNIQUE: Noncontrast head CT. Coronal and sagittal reformats. CT dose  
reduction was achieved through the use of a standardized protocol tailored for  
this examination and automatic exposure control for dose modulation. FINDINGS: The ventricles and sulci are age-appropriate without hydrocephalus. There is no mass effect or midline shift. There is no intracranial hemorrhage or  
extra-axial fluid collection. There is no abnormal area of decreased density to  
suggest infarct. The calvarium is intact. The visualized paranasal sinuses and mastoid air cells are clear. CXR Results  (Last 48 hours) None Medical Decision Making I am the first provider for this patient. I reviewed the vital signs, available nursing notes, past medical history, past surgical history, family history and social history. Vital Signs-Reviewed the patient's vital signs. Patient Vitals for the past 24 hrs: 
 Temp Pulse Resp BP SpO2  
04/15/19 2341 98.2 °F (36.8 °C) 98 14 121/84 95 % Pulse Oximetry Analysis - 95% on RA Cardiac Monitor:  
Rate: 98 bpm 
Rhythm: Normal Sinus Rhythm Records Reviewed: Nursing Notes, Old Medical Records, Previous electrocardiograms, Previous Radiology Studies and Previous Laboratory Studies Provider Notes (Medical Decision Making):  
Patient is a 49-year-old female history of epilepsy with recent admission for breakthrough seizures presents via EMS with witnessed seizure episode prior to arrival.  Patient is complaining of headache and given scalp laceration will proceed with CT neuroimaging. Will load with a gram of Keppra. Will check electrolytes, urinalysis to evaluate for metabolic reasons for seizures. Patient is otherwise well-appearing and reportedly at baseline mental status. Patient does have a history of developmental delay and MR but exam is nonfocal. 
 
ED Course:  
Initial assessment performed. The patients presenting problems have been discussed, and they are in agreement with the care plan formulated and outlined with them. I have encouraged them to ask questions as they arise throughout their visit. I reviewed our electronic medical record system for any past medical records that were available that may contribute to the patient's current condition, the nursing notes and vital signs from today's visit. Cici Flannery MD 
Medications Administered During ED Course: 
Medications  
levETIRAcetam in saline (iso-os) (KEPPRA) infusion 1,000 mg (0 mg IntraVENous IV Completed 4/16/19 0259) acetaminophen (TYLENOL) tablet 650 mg (650 mg Oral Given 4/16/19 0046)  
sodium chloride 0.9 % bolus infusion 1,000 mL (0 mL IntraVENous IV Completed 4/16/19 0259) cefTRIAXone (ROCEPHIN) 1 g in 0.9% sodium chloride (MBP/ADV) 50 mL (0 g IntraVENous IV Completed 4/16/19 4574) potassium chloride (K-DUR, KLOR-CON) SR tablet 40 mEq (40 mEq Oral Given 4/16/19 0256) Procedure Note - Laceration Repair: 
Procedure by Jeanne Hatch MD 
Complexity: simple 1.5 cm linear laceration to scalp  was irrigated copiously with NS under jet lavage, prepped with Chlorprep and draped in a sterile fashion. he wound was explored with the following results: No foreign bodies found. The wound was repaired with 2 staples. The wound was closed with good hemostasis and approximation. Sterile dressing applied. Estimated blood loss: none The procedure took 1-15 minutes, and pt tolerated well. Progress Note: 
Patient reassessed. Patient does have evidence of a urinary tract infection. Patient was given a gram of ceftriaxone. Patient will also given a gram of Keppra. We will replete potassium. Patient has not had any seizure-like activity here in the ER. Will discharge patient home at this time. Return precautions given. Progress Note: 
Patient has been reassessed and reports feeling better and symptoms have improved after ED treatment. Lashaun Peter is able to tolerate PO and ambulate per baseline. Massimo Tothfreddy Roblero's final labs and imaging have been reviewed with her. She has been counseled regarding her diagnosis. She verbally conveys understanding and agreement of the signs, symptoms, diagnosis, treatment and prognosis and additionally agrees to follow up as recommended with Dr. Dana Regalado MD in 24 - 48 hours. She also agrees with the care-plan and conveys that all of her questions have been answered.   I have also put together some discharge instructions for her that include: 1) educational information regarding their diagnosis, 2) how to care for their diagnosis at home, as well a 3) list of reasons why they would want to return to the ED prior to their follow-up appointment, should their condition change. I have answered all questions to the patient's satisfaction. Strict return precautions given. She both understood and agreed with plan as discussed above. Vital signs stable for discharge. Disposition: DISCHARGE The pt is ready for discharge. The pt's signs, symptoms, diagnosis, and discharge instructions have been discussed and pt has conveyed their understanding. The pt is to follow up as recommended or return to ER should their symptoms worsen. Plan has been discussed and pt is in agreement. PLAN: 
1. Discharge Medication List as of 4/16/2019  2:50 AM  
  
CONTINUE these medications which have NOT CHANGED Details  
levETIRAcetam 1,000 mg tablet Take 1 Tab by mouth two (2) times a day., Print, Disp-60 Tab, R-1  
  
pantoprazole (PROTONIX) 40 mg tablet Take 1 Tab by mouth Daily (before breakfast). , Print, Disp-30 Tab, R-1  
  
  
 
2. Follow-up Information Follow up With Specialties Details Why Contact Info Denae Roach MD [place] [id number] [phone] [facility] EMERGENCY DEPT Emergency Medicine  As needed, If symptoms worsen [address] [facility] 
[phone] Return to ED if worse Diagnosis Clinical Impression: 1. Breakthrough seizure ([address] 2. Urinary tract infection without hematuria, site unspecified 3. Laceration of scalp, initial encounter 4. Acute hypokalemia Attestation: 
 
I personally performed the services described in this documentation on this date 4/15/2019 for patient Olga Husain. I have reviewed and verified that the information is accurate and complete.  Delma Landon MD 
 
 Please note that this dictation was completed with [employer], the [employer] voice recognition software. Quite often unanticipated grammatical, syntax, homophones, and other interpretive errors are inadvertently transcribed by the computer software. Please disregard these errors. Please excuse any errors that have escaped final proofreading. Thank you. This note will not be viewable in [address]

## 2019-04-16 NOTE — DISCHARGE INSTRUCTIONS
You have two staples in your scalp, needs to be removed in 7-10 days      Patient Education        Epilepsy: Care Instructions  Your Care Instructions    Epilepsy is a common condition that causes repeated seizures. The seizures are caused by bursts of electrical activity in the brain that aren't normal. Seizures may cause problems with muscle control, movement, speech, vision, or awareness. They can be scary. Epilepsy affects each person differently. Some people have only a few seizures. Others get them more often. If you know what triggers a seizure, you may be able to avoid having one. You can take medicines to control and reduce seizures. You and your doctor will need to find the right combination, schedule, and dose of medicine. This may take time and careful changes. Seizures may get worse and happen more often over time. Follow-up care is a key part of your treatment and safety. Be sure to make and go to all appointments, and call your doctor if you are having problems. It's also a good idea to know your test results and keep a list of the medicines you take. How can you care for yourself at home? · Be safe with medicines. Take your medicines exactly as prescribed. Call your doctor if you think you are having a problem with your medicine. · Make a treatment plan with your doctor. Be sure to follow your plan. · Try to identify and avoid things that may make you more likely to have a seizure. These may include:  ? Not getting enough sleep. ? Using drugs or alcohol. ? Being emotionally stressed. ? Skipping meals. · Keep a record of any seizures you have. Note the date, time of day, and any details about the seizure that you can remember. Your doctor can use this information to plan or adjust your medicine or other treatment. · Be sure that any doctor treating you for another condition knows that you have epilepsy. Each doctor should know what medicines you are taking, if any.   · Wear a medical ID bracelet. You can buy this at most [facility]. If you have a seizure that leaves you unconscious or unable to speak for yourself, this bracelet will let those who are treating you know that you have epilepsy. · Talk to your doctor about whether it is safe for you to do certain activities, such as drive or swim. When should you call for help? Call 911 anytime you think you may need emergency care. For example, call if:    · A seizure does not stop as it normally does.     · You have new symptoms such as:  ? Numbness, tingling, or weakness on one side of your body or face. ? Vision changes. ? Trouble speaking or thinking clearly.    Call your doctor now or seek immediate medical care if:    · You have a fever.     · You have a severe headache.    Watch closely for changes in your health, and be sure to contact your doctor if:    · The normal pattern or features of your seizures change. Where can you learn more? Go to http://naziaMocha.cnbrina.info/. Eva Danis in the search box to learn more about \"Epilepsy: Care Instructions. \"  Current as of: Selena 3, 2018  Content Version: 11.9  © 6679-2211 [employer]. Care instructions adapted under license by [employer] (which disclaims liability or warranty for this information). If you have questions about a medical condition or this instruction, always ask your healthcare professional. [place] [address] any warranty or liability for your use of this information. Patient Education        Cuts: Care Instructions  Your Care Instructions  A cut can happen anywhere on your body. Stitches, staples, skin adhesives, or pieces of tape called Steri-Strips are sometimes used to keep the edges of a cut together and help it heal. Steri-Strips can be used by themselves or with stitches or staples. Sometimes cuts are left open. If the cut went deep and through the skin, the doctor may have closed the cut in two layers. A deeper layer of stitches brings the deep part of the cut together. These stitches will dissolve and don't need to be removed. The upper layer closure, which could be stitches, staples, Steri-Strips, or adhesive, is what you see on the cut. A cut is often covered by a bandage. The doctor has checked you carefully, but problems can develop later. If you notice any problems or new symptoms, get medical treatment right away. Follow-up care is a key part of your treatment and safety. Be sure to make and go to all appointments, and call your doctor if you are having problems. It's also a good idea to know your test results and keep a list of the medicines you take. How can you care for yourself at home? If a cut is open or closed  · Prop up the sore area on a pillow anytime you sit or lie down during the next 3 days. Try to keep it above the level of your heart. This will help reduce swelling. · Keep the cut dry for the first 24 to 48 hours. After this, you can shower if your doctor okays it. Pat the cut dry. · Don't soak the cut, such as in a bathtub. Your doctor will tell you when it's safe to get the cut wet. · After the first 24 to 48 hours, clean the cut with soap and water 2 times a day unless your doctor gives you different instructions. ? Don't use hydrogen peroxide or alcohol, which can slow healing. ? You may cover the cut with a thin layer of petroleum jelly and a nonstick bandage. ? If the doctor put a bandage over the cut, put on a new bandage after cleaning the cut or if the bandage gets wet or dirty. · Avoid any activity that could cause your cut to reopen. · Be safe with medicines. Read and follow all instructions on the label. ? If the doctor gave you a prescription medicine for pain, take it as prescribed. ? If you are not taking a prescription pain medicine, ask your doctor if you can take an over-the-counter medicine.   If the cut is closed with stitches, staples, or Steri-Strips  · Follow the above instructions for open or closed cuts. · Do not remove the stitches or staples on your own. Your doctor will tell you when to come back to have the stitches or staples removed. · Leave Steri-Strips on until they fall off. If the cut is closed with a skin adhesive  · Follow the above instructions for open or closed cuts. · Leave the skin adhesive on your skin until it falls off on its own. This may take 5 to 10 days. · Do not scratch, rub, or pick at the adhesive. · Do not put the sticky part of a bandage directly on the adhesive. · Do not put any kind of ointment, cream, or lotion over the area. This can make the adhesive fall off too soon. Do not use hydrogen peroxide or alcohol, which can slow healing. When should you call for help? Call your doctor now or seek immediate medical care if:    · You have new pain, or your pain gets worse.     · The skin near the cut is cold or pale or changes color.     · You have tingling, weakness, or numbness near the cut.     · The cut starts to bleed, and blood soaks through the bandage. Oozing small amounts of blood is normal.     · You have trouble moving the area near the cut.     · You have symptoms of infection, such as:  ? Increased pain, swelling, warmth, or redness around the cut.  ? Red streaks leading from the cut.  ? Pus draining from the cut.  ? A fever.    Watch closely for changes in your health, and be sure to contact your doctor if:    · The cut reopens.     · You do not get better as expected. Where can you learn more? Go to http://nazia-brina.info/. Enter M735 in the search box to learn more about \"Cuts: Care Instructions. \"  Current as of: September 23, 2018  Content Version: 11.9  © 6140-8877 [employer]. Care instructions adapted under license by [employer] (which disclaims liability or warranty for this information).  If you have questions about a medical condition or this instruction, always ask your healthcare professional. [place] [address] any warranty or liability for your use of this information.

## 2019-04-17 LAB — LEVETIRACETAM SERPL-MCNC: 26.9 UG/ML (ref 10–40)

## 2019-04-18 ENCOUNTER — TELEPHONE (OUTPATIENT)
Dept: NEUROLOGY | Age: 34
End: 2019-04-18

## 2019-04-18 DIAGNOSIS — G40.909 NONINTRACTABLE EPILEPSY WITHOUT STATUS EPILEPTICUS, UNSPECIFIED EPILEPSY TYPE (HCC): Primary | ICD-10-CM

## 2019-04-18 DIAGNOSIS — G40.909 NONINTRACTABLE EPILEPSY WITHOUT STATUS EPILEPTICUS, UNSPECIFIED EPILEPSY TYPE (HCC): ICD-10-CM

## 2019-04-18 LAB
BACTERIA SPEC CULT: ABNORMAL
CC UR VC: ABNORMAL
SERVICE CMNT-IMP: ABNORMAL

## 2019-04-18 RX ORDER — LEVETIRACETAM 750 MG/1
1500 TABLET ORAL 2 TIMES DAILY
Qty: 360 TAB | Refills: 0 | Status: SHIPPED | OUTPATIENT
Start: 2019-04-18 | End: 2019-04-18 | Stop reason: SDUPTHER

## 2019-04-18 RX ORDER — LEVETIRACETAM 750 MG/1
1500 TABLET ORAL 2 TIMES DAILY
Qty: 360 TAB | Refills: 0 | Status: SHIPPED | OUTPATIENT
Start: 2019-04-18 | End: 2019-07-17

## 2019-04-27 ENCOUNTER — HOSPITAL ENCOUNTER (EMERGENCY)
Age: 34
Discharge: HOME | End: 2019-04-28
Attending: EMERGENCY MEDICINE
Payer: MEDICARE

## 2019-04-27 DIAGNOSIS — N30.00 ACUTE CYSTITIS WITHOUT HEMATURIA: ICD-10-CM

## 2019-04-27 DIAGNOSIS — R56.9 SEIZURE (HCC): Primary | ICD-10-CM

## 2019-04-27 PROCEDURE — 99285 EMERGENCY DEPT VISIT HI MDM: CPT

## 2019-04-28 VITALS
DIASTOLIC BLOOD PRESSURE: 76 MMHG | HEART RATE: 101 BPM | SYSTOLIC BLOOD PRESSURE: 122 MMHG | RESPIRATION RATE: 20 BRPM | TEMPERATURE: 98.2 F | OXYGEN SATURATION: 94 %

## 2019-04-28 LAB
ALBUMIN SERPL-MCNC: 3.7 G/DL (ref 3.5–5)
ALBUMIN/GLOB SERPL: 1 {RATIO} (ref 1.1–2.2)
ALP SERPL-CCNC: 78 U/L (ref 45–117)
ALT SERPL-CCNC: 21 U/L (ref 12–78)
ANION GAP SERPL CALC-SCNC: 7 MMOL/L (ref 5–15)
APPEARANCE UR: CLEAR
AST SERPL-CCNC: 12 U/L (ref 15–37)
BACTERIA URNS QL MICRO: ABNORMAL /HPF
BASOPHILS # BLD: 0 K/UL (ref 0–0.1)
BASOPHILS NFR BLD: 0 % (ref 0–1)
BILIRUB SERPL-MCNC: 0.2 MG/DL (ref 0.2–1)
BILIRUB UR QL: NEGATIVE
BUN SERPL-MCNC: 5 MG/DL (ref 6–20)
BUN/CREAT SERPL: 10 (ref 12–20)
CALCIUM SERPL-MCNC: 8.4 MG/DL (ref 8.5–10.1)
CHLORIDE SERPL-SCNC: 105 MMOL/L (ref 97–108)
CO2 SERPL-SCNC: 25 MMOL/L (ref 21–32)
COLOR UR: ABNORMAL
CREAT SERPL-MCNC: 0.49 MG/DL (ref 0.55–1.02)
DIFFERENTIAL METHOD BLD: ABNORMAL
EOSINOPHIL # BLD: 0 K/UL (ref 0–0.4)
EOSINOPHIL NFR BLD: 0 % (ref 0–7)
EPITH CASTS URNS QL MICRO: ABNORMAL /LPF
ERYTHROCYTE [DISTWIDTH] IN BLOOD BY AUTOMATED COUNT: 12.8 % (ref 11.5–14.5)
GLOBULIN SER CALC-MCNC: 3.6 G/DL (ref 2–4)
GLUCOSE SERPL-MCNC: 163 MG/DL (ref 65–100)
GLUCOSE UR STRIP.AUTO-MCNC: NEGATIVE MG/DL
HCT VFR BLD AUTO: 40.4 % (ref 35–47)
HGB BLD-MCNC: 13.4 G/DL (ref 11.5–16)
HGB UR QL STRIP: ABNORMAL
IMM GRANULOCYTES # BLD AUTO: 0 K/UL (ref 0–0.04)
IMM GRANULOCYTES NFR BLD AUTO: 0 % (ref 0–0.5)
KETONES UR QL STRIP.AUTO: NEGATIVE MG/DL
LEUKOCYTE ESTERASE UR QL STRIP.AUTO: ABNORMAL
LYMPHOCYTES # BLD: 0.8 K/UL (ref 0.8–3.5)
LYMPHOCYTES NFR BLD: 6 % (ref 12–49)
MCH RBC QN AUTO: 28.4 PG (ref 26–34)
MCHC RBC AUTO-ENTMCNC: 33.2 G/DL (ref 30–36.5)
MCV RBC AUTO: 85.6 FL (ref 80–99)
MONOCYTES # BLD: 0.4 K/UL (ref 0–1)
MONOCYTES NFR BLD: 3 % (ref 5–13)
NEUTS SEG # BLD: 11.7 K/UL (ref 1.8–8)
NEUTS SEG NFR BLD: 91 % (ref 32–75)
NITRITE UR QL STRIP.AUTO: POSITIVE
NRBC # BLD: 0 K/UL (ref 0–0.01)
NRBC BLD-RTO: 0 PER 100 WBC
PH UR STRIP: 5.5 [PH] (ref 5–8)
PLATELET # BLD AUTO: 333 K/UL (ref 150–400)
PMV BLD AUTO: 9.5 FL (ref 8.9–12.9)
POTASSIUM SERPL-SCNC: 3.2 MMOL/L (ref 3.5–5.1)
PROT SERPL-MCNC: 7.3 G/DL (ref 6.4–8.2)
PROT UR STRIP-MCNC: NEGATIVE MG/DL
RBC # BLD AUTO: 4.72 M/UL (ref 3.8–5.2)
RBC #/AREA URNS HPF: ABNORMAL /HPF (ref 0–5)
SODIUM SERPL-SCNC: 137 MMOL/L (ref 136–145)
SP GR UR REFRACTOMETRY: 1.02 (ref 1–1.03)
UROBILINOGEN UR QL STRIP.AUTO: 0.2 EU/DL (ref 0.2–1)
WBC # BLD AUTO: 13 K/UL (ref 3.6–11)
WBC URNS QL MICRO: ABNORMAL /HPF (ref 0–4)

## 2019-04-28 PROCEDURE — 87077 CULTURE AEROBIC IDENTIFY: CPT

## 2019-04-28 PROCEDURE — 87186 SC STD MICRODIL/AGAR DIL: CPT

## 2019-04-28 PROCEDURE — 81001 URINALYSIS AUTO W/SCOPE: CPT

## 2019-04-28 PROCEDURE — 74011250636 HC RX REV CODE- 250/636: Performed by: EMERGENCY MEDICINE

## 2019-04-28 PROCEDURE — 77030011943

## 2019-04-28 PROCEDURE — 87086 URINE CULTURE/COLONY COUNT: CPT

## 2019-04-28 PROCEDURE — 36415 COLL VENOUS BLD VENIPUNCTURE: CPT

## 2019-04-28 PROCEDURE — 96361 HYDRATE IV INFUSION ADD-ON: CPT

## 2019-04-28 PROCEDURE — 74011000258 HC RX REV CODE- 258: Performed by: EMERGENCY MEDICINE

## 2019-04-28 PROCEDURE — 85025 COMPLETE CBC W/AUTO DIFF WBC: CPT

## 2019-04-28 PROCEDURE — 96365 THER/PROPH/DIAG IV INF INIT: CPT

## 2019-04-28 PROCEDURE — 80053 COMPREHEN METABOLIC PANEL: CPT

## 2019-04-28 PROCEDURE — 96375 TX/PRO/DX INJ NEW DRUG ADDON: CPT

## 2019-04-28 RX ORDER — CEFTRIAXONE 1 G/1
INJECTION, POWDER, FOR SOLUTION INTRAMUSCULAR; INTRAVENOUS
Status: DISCONTINUED
Start: 2019-04-28 | End: 2019-04-28 | Stop reason: HOSPADM

## 2019-04-28 RX ORDER — SODIUM CHLORIDE 900 MG/100ML
INJECTION INTRAVENOUS
Status: DISCONTINUED
Start: 2019-04-28 | End: 2019-04-28 | Stop reason: HOSPADM

## 2019-04-28 RX ORDER — ONDANSETRON 2 MG/ML
8 INJECTION INTRAMUSCULAR; INTRAVENOUS
Status: COMPLETED | OUTPATIENT
Start: 2019-04-28 | End: 2019-04-28

## 2019-04-28 RX ORDER — CIPROFLOXACIN 500 MG/1
500 TABLET ORAL 2 TIMES DAILY
Qty: 20 TAB | Refills: 0 | Status: SHIPPED | OUTPATIENT
Start: 2019-04-28 | End: 2019-05-08

## 2019-04-28 RX ORDER — LORAZEPAM 2 MG/ML
1 INJECTION INTRAMUSCULAR
Status: COMPLETED | OUTPATIENT
Start: 2019-04-28 | End: 2019-04-28

## 2019-04-28 RX ADMIN — LORAZEPAM 1 MG: 2 INJECTION INTRAMUSCULAR; INTRAVENOUS at 00:39

## 2019-04-28 RX ADMIN — SODIUM CHLORIDE 1000 ML: 900 INJECTION, SOLUTION INTRAVENOUS at 00:38

## 2019-04-28 RX ADMIN — ONDANSETRON 8 MG: 2 INJECTION INTRAMUSCULAR; INTRAVENOUS at 00:38

## 2019-04-28 RX ADMIN — CEFTRIAXONE 1 G: 1 INJECTION, POWDER, FOR SOLUTION INTRAMUSCULAR; INTRAVENOUS at 01:30

## 2019-04-28 NOTE — DISCHARGE INSTRUCTIONS

## 2019-04-28 NOTE — ED PROVIDER NOTES
EMERGENCY DEPARTMENT HISTORY AND PHYSICAL EXAM 
     
 
Date: 4/27/2019 Patient Name: Andrew Rodriguez History of Presenting Illness Chief Complaint Patient presents with  Seizure History Provided By: Patient HPI: Andrew Rodriguez is a 37 y.o. female, pmhx epilepsy and mental delay, who presents via EMS to the ED c/o seizures. She had a short seizure approximately 7:30pm but then had two longer seizures and wasn't waking up so her group home called ems. Per EMS, she vomiting en route but started to wake up. BS was 140 HPI and ROS unable to complete as pt non-verbal at baseline. PCP: Lexus Aleman MD 
 
Allergies: Pertussis Social Hx: -tobacco, -EtOH, -Illicit Drugs There are no other complaints, changes, or physical findings at this time. Current Facility-Administered Medications Medication Dose Route Frequency Provider Last Rate Last Dose  cefTRIAXone (ROCEPHIN) 1 gram injection  0.9% sodium chloride (MBP/ADV) infusion  ADDaptor Current Outpatient Medications Medication Sig Dispense Refill  ciprofloxacin HCl (CIPRO) 500 mg tablet Take 1 Tab by mouth two (2) times a day for 10 days. 20 Tab 0  
 levETIRAcetam (KEPPRA) 750 mg tablet Take 2 Tabs by mouth two (2) times a day for 90 days. 360 Tab 0  
 pantoprazole (PROTONIX) 40 mg tablet Take 1 Tab by mouth Daily (before breakfast). 30 Tab 1 Past History Past Medical History: 
Past Medical History:  
Diagnosis Date  Brain damage 1983  
 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   
 last sz at 22years old 08/15/2018 Past Surgical History: 
Past Surgical History:  
Procedure Laterality Date  HX BACK SURGERY    
 scoliosis rods and pins  HX HEENT Bilateral 1986  
 eye muscle tightening Family History: No family history on file. Social History: 
Social History Tobacco Use  Smoking status: Never Smoker  Smokeless tobacco: Never Used Substance Use Topics  Alcohol use: No  
 Drug use: Not on file Allergies: Allergies Allergen Reactions  Bees [Sting, Bee] Anaphylaxis  Pertussis Vaccine,Adsorbed Other (comments) Review of Systems Review of Systems Unable to perform ROS: Patient nonverbal  
 
Physical Exam  
Physical Exam  
Constitutional: She is oriented to person, place, and time. She appears well-developed and well-nourished. Young female, awake and alert but unable to answer questions; she does shake and nod her head appropriately intermittently. HENT:  
Head: Normocephalic and atraumatic. Mouth/Throat: Oropharynx is clear and moist.  
Mild lingual bruising Eyes: Pupils are equal, round, and reactive to light. Conjunctivae and EOM are normal. Right eye exhibits no discharge. Left eye exhibits no discharge. Neck: Normal range of motion. Neck supple. Cardiovascular: Regular rhythm and normal heart sounds. No murmur heard. Borderline tachycardia at 100 Pulmonary/Chest: Effort normal and breath sounds normal. No respiratory distress. She has no wheezes. She has no rales. Abdominal: Soft. Bowel sounds are normal. She exhibits no distension. There is no tenderness. Musculoskeletal: Normal range of motion. She exhibits no edema. Neurological: She is alert and oriented to person, place, and time. No cranial nerve deficit. She exhibits normal muscle tone. Skin: Skin is warm and dry. No rash noted. She is not diaphoretic. Nursing note and vitals reviewed. Diagnostic Study Results Labs - Recent Results (from the past 12 hour(s)) CBC WITH AUTOMATED DIFF Collection Time: 04/28/19 12:42 AM  
Result Value Ref Range WBC 13.0 (H) 3.6 - 11.0 K/uL  
 RBC 4.72 3.80 - 5.20 M/uL  
 HGB 13.4 11.5 - 16.0 g/dL HCT 40.4 35.0 - 47.0 %  MCV 85.6 80.0 - 99.0 FL  
 MCH 28.4 26.0 - 34.0 PG  
 MCHC 33.2 30.0 - 36.5 g/dL  
 RDW 12.8 11.5 - 14.5 % PLATELET 298 081 - 766 K/uL MPV 9.5 8.9 - 12.9 FL  
 NRBC 0.0 0  WBC ABSOLUTE NRBC 0.00 0.00 - 0.01 K/uL NEUTROPHILS 91 (H) 32 - 75 % LYMPHOCYTES 6 (L) 12 - 49 % MONOCYTES 3 (L) 5 - 13 % EOSINOPHILS 0 0 - 7 % BASOPHILS 0 0 - 1 % IMMATURE GRANULOCYTES 0 0.0 - 0.5 % ABS. NEUTROPHILS 11.7 (H) 1.8 - 8.0 K/UL  
 ABS. LYMPHOCYTES 0.8 0.8 - 3.5 K/UL  
 ABS. MONOCYTES 0.4 0.0 - 1.0 K/UL  
 ABS. EOSINOPHILS 0.0 0.0 - 0.4 K/UL  
 ABS. BASOPHILS 0.0 0.0 - 0.1 K/UL  
 ABS. IMM. GRANS. 0.0 0.00 - 0.04 K/UL  
 DF AUTOMATED METABOLIC PANEL, COMPREHENSIVE Collection Time: 04/28/19 12:42 AM  
Result Value Ref Range Sodium 137 136 - 145 mmol/L Potassium 3.2 (L) 3.5 - 5.1 mmol/L Chloride 105 97 - 108 mmol/L  
 CO2 25 21 - 32 mmol/L Anion gap 7 5 - 15 mmol/L Glucose 163 (H) 65 - 100 mg/dL BUN 5 (L) 6 - 20 MG/DL Creatinine 0.49 (L) 0.55 - 1.02 MG/DL  
 BUN/Creatinine ratio 10 (L) 12 - 20 GFR est AA >60 >60 ml/min/1.73m2 GFR est non-AA >60 >60 ml/min/1.73m2 Calcium 8.4 (L) 8.5 - 10.1 MG/DL Bilirubin, total 0.2 0.2 - 1.0 MG/DL  
 ALT (SGPT) 21 12 - 78 U/L  
 AST (SGOT) 12 (L) 15 - 37 U/L Alk. phosphatase 78 45 - 117 U/L Protein, total 7.3 6.4 - 8.2 g/dL Albumin 3.7 3.5 - 5.0 g/dL Globulin 3.6 2.0 - 4.0 g/dL A-G Ratio 1.0 (L) 1.1 - 2.2 URINALYSIS W/ RFLX MICROSCOPIC Collection Time: 04/28/19 12:50 AM  
Result Value Ref Range Color YELLOW/STRAW Appearance CLEAR CLEAR Specific gravity 1.016 1.003 - 1.030    
 pH (UA) 5.5 5.0 - 8.0 Protein NEGATIVE  NEG mg/dL Glucose NEGATIVE  NEG mg/dL Ketone NEGATIVE  NEG mg/dL Bilirubin NEGATIVE  NEG Blood TRACE (A) NEG Urobilinogen 0.2 0.2 - 1.0 EU/dL Nitrites POSITIVE (A) NEG Leukocyte Esterase SMALL (A) NEG    
 WBC 0-4 0 - 4 /hpf  
 RBC 0-5 0 - 5 /hpf Epithelial cells FEW FEW /lpf Bacteria 4+ (A) NEG /hpf Radiologic Studies - No orders to display CT Results  (Last 48 hours) None CXR Results  (Last 48 hours) None Medical Decision Making I am the first provider for this patient. I reviewed the vital signs, available nursing notes, past medical history, past surgical history, family history and social history. Vital Signs-Reviewed the patient's vital signs. Patient Vitals for the past 12 hrs: 
 Temp Pulse Resp BP SpO2  
04/28/19 0215  (!) 101 20 122/76 94 % 04/28/19 0145  (!) 102 20 125/83 98 % 04/28/19 0115  (!) 112 16 125/59 96 % 04/28/19 0100  (!) 107 21 112/61 95 % 04/28/19 0056  (!) 109 19 114/72   
04/28/19 0030  (!) 101 13 108/68 91 % 04/28/19 0015  100 24 101/66 (!) 89 % 04/28/19 0010 98.2 °F (36.8 °C) 99 16 107/60 94 % 04/28/19 0006  99 15 107/60  Pulse Oximetry Analysis - 99% on RA Cardiac Monitor:  
Rate: 104bpm 
Rhythm: Normal Sinus Rhythm Records Reviewed: Nursing Notes, Old Medical Records, Previous Radiology Studies and Previous Laboratory Studies Provider Notes (Medical Decision Making): MDM: Toya Aranda female appearing post seizure x3 tonight. She has aroused appears to be acting at her baseline per staff. Prior history of UTI over the past 2 weeks and is currently undergoing treatment which should be completed today. Will initiate evaluation for etiology of recurrent seizures. ED Course:  
Initial assessment performed. The patients presenting problems have been discussed, and they are in agreement with the care plan formulated and outlined with them. I have encouraged them to ask questions as they arise throughout their visit. PROGRESS NOTE: 
1:25 AM 
Pt doing well; without any further seizures. She continues to awaken when stimulated. Heart rate appears to be decreasing with IV fluid infusion. Will initiate IV antibiotics given continued UTI despite Keflex therapy. Will disposition back to facility after IV fluids and antibiotics have completed and initiate change in antibiotics to ciprofloxacin twice daily. Urine culture sent. 4 AM 
Mom called and explained that she has had continued seizures after changing to Lamictal.  I explained that with this current infection it would be a good idea to change her medications now. With attempts to fix her urinary tract infection and follow-up with neurology for further evaluation at that time. Critical Care Time:  
0 Diagnosis Clinical Impression: 1. Seizure ([address] 2. Acute cystitis without hematuria PLAN: 
1. Discharge Medication List as of 2019  1:36 AM  
  
START taking these medications Details  
ciprofloxacin HCl (CIPRO) 500 mg tablet Take 1 Tab by mouth two (2) times a day for 10 days. , Normal, Disp-20 Tab, R-0  
  
  
CONTINUE these medications which have NOT CHANGED Details  
levETIRAcetam (KEPPRA) 750 mg tablet Take 2 Tabs by mouth two (2) times a day for 90 days. , Normal, Disp-360 Tab, R-0  
  
pantoprazole (PROTONIX) 40 mg tablet Take 1 Tab by mouth Daily (before breakfast). , Print, Disp-30 Tab, R-1  
  
  
STOP taking these medications  
  
 cephALEXin (KEFLEX) 500 mg capsule Comments:  
Reason for Stoppin.  
Follow-up Information Follow up With Specialties Details Why Contact Info [facility] EMERGENCY DEPT Emergency Medicine  If symptoms worsen [address] [place] [address] [address] [facility] 
[phone] Return to ED if worse Disposition: 
Home Please note, this dictation was completed with [employer], the computer voice recognition software. Quite often unanticipated grammatical, syntax, homophones, and other interpretive errors are inadvertently transcribed by the computer software. Please disregard these errors. Please excuse any errors that have escaped final proof reading.

## 2019-04-29 ENCOUNTER — TELEPHONE (OUTPATIENT)
Dept: NEUROLOGY | Age: 34
End: 2019-04-29

## 2019-04-29 DIAGNOSIS — G40.909 NONINTRACTABLE EPILEPSY WITHOUT STATUS EPILEPTICUS, UNSPECIFIED EPILEPSY TYPE (HCC): Primary | ICD-10-CM

## 2019-04-29 RX ORDER — OXCARBAZEPINE 300 MG/1
TABLET, FILM COATED ORAL
Qty: 120 TAB | Refills: 5 | Status: SHIPPED | OUTPATIENT
Start: 2019-04-29 | End: 2019-06-10 | Stop reason: SDUPTHER

## 2019-04-30 LAB
BACTERIA SPEC CULT: ABNORMAL
CC UR VC: ABNORMAL
SERVICE CMNT-IMP: ABNORMAL

## 2019-05-08 ENCOUNTER — OFFICE VISIT (OUTPATIENT)
Dept: NEUROLOGY | Age: 34
End: 2019-05-08

## 2019-05-08 VITALS
WEIGHT: 161 LBS | OXYGEN SATURATION: 98 % | HEIGHT: 62 IN | HEART RATE: 105 BPM | SYSTOLIC BLOOD PRESSURE: 122 MMHG | DIASTOLIC BLOOD PRESSURE: 78 MMHG | BODY MASS INDEX: 29.63 KG/M2

## 2019-05-08 DIAGNOSIS — G40.909 NONINTRACTABLE EPILEPSY WITHOUT STATUS EPILEPTICUS, UNSPECIFIED EPILEPSY TYPE (HCC): Primary | ICD-10-CM

## 2019-05-08 DIAGNOSIS — G93.49 CHRONIC STATIC ENCEPHALOPATHY: ICD-10-CM

## 2019-05-08 DIAGNOSIS — F42.9 OBSESSIVE-COMPULSIVE DISORDER, UNSPECIFIED TYPE: ICD-10-CM

## 2019-05-08 NOTE — LETTER
5/8/19 Patient: Trudi Causey YOB: 1985 Date of Visit: 5/8/2019 Faisal Fung MD 
[address] [place] [id number] VIA In Basket Dear Faisal Fung MD, Thank you for referring Ms. Trudi Causey to [address] [place] [address] for evaluation. My notes for this consultation are attached. If you have questions, please do not hesitate to call me. I look forward to following your patient along with you. Sincerely, Darlyn Dugan MD

## 2019-05-08 NOTE — PATIENT INSTRUCTIONS

## 2019-05-08 NOTE — PROGRESS NOTES
NEUROLOGY FOLLOW UP NOTE    05/08/19    Chief Complaint   Patient presents with   [facility] Follow Up     epilepsy       Subjective  Shahzad Caballero is a 37 y.o. female who presented to the neurology office for management of seizures. [facility] does have [facility] and was seizure free from [age] yrs of age. She had not had a seizure atleast since 2013. The patient was taking Trileptal 600 mg p.o. twice daily but the dosage was decreased and the patient end up having a seizure. Patient was hospitalized and was started on Keppra. Patient continued to have breakthrough seizures on Keppra and hence Keppra has being weaned off and Trileptal has been restarted initially at 300 mg p.o. twice daily and now on 600 mg p.o. twice daily in a week. No recurrence of seizures since the patient is on Trileptal.    Current Outpatient Medications   Medication Sig    OXcarbazepine (TRILEPTAL) 300 mg tablet 300 mg p.o. twice daily for 1 week and then 600 mg p.o. twice daily    ciprofloxacin HCl (CIPRO) 500 mg tablet Take 1 Tab by mouth two (2) times a day for 10 days.  pantoprazole (PROTONIX) 40 mg tablet Take 1 Tab by mouth Daily (before breakfast).  levETIRAcetam (KEPPRA) 750 mg tablet Take 2 Tabs by mouth two (2) times a day for 90 days. No current facility-administered medications for this visit.       Allergies   Allergen Reactions    Bees [Sting, Bee] Anaphylaxis    Pertussis Vaccine,Adsorbed Other (comments)       REVIEW OF SYSTEMS:   A ten system review of constitutional, cardiovascular, respiratory, musculoskeletal, endocrine, skin, SHEENT, genitourinary, psychiatric and neurologic systems was obtained and is unremarkable except as stated in HPI    EXAMINATION:   Visit Vitals  /78   Pulse (!) 105   Ht 5' 2\" (1.575 m)   Wt 161 lb (73 kg)   SpO2 98%   BMI 29.45 kg/m²        General:   General appearance: Pt is in no acute distress   Distal pulses are preserved    Neurological Examination:   Mental Status: AA. Speech is fluent. Follows commands, has normal fund of knowledge, attention, short term recall, comprehension and insight. Cranial Nerves: Visual fields are full. PERRL, Extraocular movements are full. Facial sensation intact V1- V3. Facial movement intact, symmetric. Hearing intact to conversation. Palate elevates symmetrically. Shoulder shrug symmetric. Tongue midline. Motor: Strength is 5/5 in all 4 ext.     Sensation: Normal to light touch    Coordination/Cerebellar: Intact to finger-nose-finger     Laboratory review:   Results for orders placed or performed during the hospital encounter of 04/27/19   CULTURE, URINE   Result Value Ref Range    Special Requests: NO SPECIAL REQUESTS      Stanton Count >100,000  COLONIES/mL        Culture result: KLEBSIELLA PNEUMONIAE (A)         Susceptibility    Klebsiella pneumoniae - KOBE     Amikacin ($) <=16 Susceptible ug/mL     Ampicillin/sulbactam ($) <=8/4 Susceptible ug/mL     Aztreonam ($$$$) <=4 Susceptible ug/mL     Cefazolin ($) <=8 Susceptible ug/mL     Cefepime ($$) <=4 Susceptible ug/mL     Cefotaxime <=2 Susceptible ug/mL     Ceftazidime ($) <=1 Susceptible ug/mL     Ceftriaxone ($) <=1 Susceptible ug/mL     Cefuroxime ($) <=4 Susceptible ug/mL     Ciprofloxacin ($) <=1 Susceptible ug/mL     Gentamicin ($) <=4 Susceptible ug/mL     Imipenem <=1 Susceptible ug/mL     Levofloxacin ($) <=2 Susceptible ug/mL     Meropenem ($$) <=1 Susceptible ug/mL     Nitrofurantoin 64 Intermediate ug/mL     Piperacillin/Tazobac ($) <=16 Susceptible ug/mL     Tobramycin ($) <=4 Susceptible ug/mL     Trimeth/Sulfa <=2/38 Susceptible ug/mL   CBC WITH AUTOMATED DIFF   Result Value Ref Range    WBC 13.0 (H) 3.6 - 11.0 K/uL    RBC 4.72 3.80 - 5.20 M/uL    HGB 13.4 11.5 - 16.0 g/dL    HCT 40.4 35.0 - 47.0 %    MCV 85.6 80.0 - 99.0 FL    MCH 28.4 26.0 - 34.0 PG    MCHC 33.2 30.0 - 36.5 g/dL    RDW 12.8 11.5 - 14.5 %    PLATELET 492 764 - 570 K/uL    MPV 9.5 8.9 - 12.9 FL    NRBC 0.0 0  WBC    ABSOLUTE NRBC 0.00 0.00 - 0.01 K/uL    NEUTROPHILS 91 (H) 32 - 75 %    LYMPHOCYTES 6 (L) 12 - 49 %    MONOCYTES 3 (L) 5 - 13 %    EOSINOPHILS 0 0 - 7 %    BASOPHILS 0 0 - 1 %    IMMATURE GRANULOCYTES 0 0.0 - 0.5 %    ABS. NEUTROPHILS 11.7 (H) 1.8 - 8.0 K/UL    ABS. LYMPHOCYTES 0.8 0.8 - 3.5 K/UL    ABS. MONOCYTES 0.4 0.0 - 1.0 K/UL    ABS. EOSINOPHILS 0.0 0.0 - 0.4 K/UL    ABS. BASOPHILS 0.0 0.0 - 0.1 K/UL    ABS. IMM. GRANS. 0.0 0.00 - 0.04 K/UL    DF AUTOMATED     URINALYSIS W/ RFLX MICROSCOPIC   Result Value Ref Range    Color YELLOW/STRAW      Appearance CLEAR CLEAR      Specific gravity 1.016 1.003 - 1.030      pH (UA) 5.5 5.0 - 8.0      Protein NEGATIVE  NEG mg/dL    Glucose NEGATIVE  NEG mg/dL    Ketone NEGATIVE  NEG mg/dL    Bilirubin NEGATIVE  NEG      Blood TRACE (A) NEG      Urobilinogen 0.2 0.2 - 1.0 EU/dL    Nitrites POSITIVE (A) NEG      Leukocyte Esterase SMALL (A) NEG      WBC 0-4 0 - 4 /hpf    RBC 0-5 0 - 5 /hpf    Epithelial cells FEW FEW /lpf    Bacteria 4+ (A) NEG /hpf   METABOLIC PANEL, COMPREHENSIVE   Result Value Ref Range    Sodium 137 136 - 145 mmol/L    Potassium 3.2 (L) 3.5 - 5.1 mmol/L    Chloride 105 97 - 108 mmol/L    CO2 25 21 - 32 mmol/L    Anion gap 7 5 - 15 mmol/L    Glucose 163 (H) 65 - 100 mg/dL    BUN 5 (L) 6 - 20 MG/DL    Creatinine 0.49 (L) 0.55 - 1.02 MG/DL    BUN/Creatinine ratio 10 (L) 12 - 20      GFR est AA >60 >60 ml/min/1.73m2    GFR est non-AA >60 >60 ml/min/1.73m2    Calcium 8.4 (L) 8.5 - 10.1 MG/DL    Bilirubin, total 0.2 0.2 - 1.0 MG/DL    ALT (SGPT) 21 12 - 78 U/L    AST (SGOT) 12 (L) 15 - 37 U/L    Alk. phosphatase 78 45 - 117 U/L    Protein, total 7.3 6.4 - 8.2 g/dL    Albumin 3.7 3.5 - 5.0 g/dL    Globulin 3.6 2.0 - 4.0 g/dL    A-G Ratio 1.0 (L) 1.1 - 2.2         Imaging review:   4/8/2019  EEG  Abnormal.  Excessive beta activity seen secondary to medication.     2/19/2013: EEG had right temporal sharp waves that were improved from spike and wave complexes. Documentation review:  None    Assessment/Plan:   Arleen Hernandez is a 37 y.o. 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 She was initially taking Trileptal 600 mg p.o. twice daily which was decreased and patient ended up having a seizure. Patient was started on Keppra but continued to have seizure on that. The patient is now off Keppra and is on Trileptal 600 mg p.o. Follow-up in 3 months    Over 25 minutes was spent with the patient of which > 50% of the visit was spent counseling on diagnosis, management, and treatment of the diagnosis. I did discuss about medications          ICD-10-CM ICD-9-CM    1. Nonintractable epilepsy without status epilepticus, unspecified epilepsy type ([facility] [address] G40.909 345.90    2. 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 Obsessive-compulsive disorder, unspecified type F42.9 300.3        This note will not be viewable in [facility].

## 2019-05-09 DIAGNOSIS — G40.909 NONINTRACTABLE EPILEPSY WITHOUT STATUS EPILEPTICUS, UNSPECIFIED EPILEPSY TYPE (HCC): ICD-10-CM

## 2019-05-09 RX ORDER — OXCARBAZEPINE 300 MG/1
TABLET, FILM COATED ORAL
Qty: 120 TAB | Refills: 5 | Status: CANCELLED | OUTPATIENT
Start: 2019-05-09

## 2019-06-10 DIAGNOSIS — G40.909 NONINTRACTABLE EPILEPSY WITHOUT STATUS EPILEPTICUS, UNSPECIFIED EPILEPSY TYPE (HCC): ICD-10-CM

## 2019-06-10 NOTE — TELEPHONE ENCOUNTER
Future Appointments   Date Time Provider Alex Byrne   2019 10:00 AM Rip Coleman  [facility]   3/25/2020 10:00 AM Rip Coleman  [facility]                         Last Appointment My Department:  2019    Please advise of refill below.   Requested Prescriptions     Pending Prescriptions Disp Refills    OXcarbazepine (TRILEPTAL) 300 mg tablet 180 Tab 5     Si mg p.o. twice daily

## 2019-06-11 RX ORDER — OXCARBAZEPINE 300 MG/1
TABLET, FILM COATED ORAL
Qty: 180 TAB | Refills: 5 | Status: SHIPPED | OUTPATIENT
Start: 2019-06-11 | End: 2019-11-19 | Stop reason: ALTCHOICE

## 2019-06-11 RX ORDER — PANTOPRAZOLE SODIUM 40 MG/1
40 TABLET, DELAYED RELEASE ORAL
Qty: 30 TAB | Refills: 5 | OUTPATIENT
Start: 2019-06-11

## 2019-06-12 NOTE — TELEPHONE ENCOUNTER
Future Appointments   Date Time Provider Alex Byrne   11/19/2019 10:00 AM Bandar Toscano  [facility]   3/25/2020 10:00 AM Bandar Toscano  [facility]                         Last Appointment My Department:  5/8/2019    Please advise of refill below. Requested Prescriptions     Pending Prescriptions Disp Refills    pantoprazole (PROTONIX) 40 mg tablet 30 Tab 1     Sig: Take 1 Tab by mouth Daily (before breakfast).

## 2019-06-13 RX ORDER — PANTOPRAZOLE SODIUM 40 MG/1
40 TABLET, DELAYED RELEASE ORAL
Qty: 30 TAB | Refills: 1 | OUTPATIENT
Start: 2019-06-13

## 2019-08-19 DIAGNOSIS — G40.909 NONINTRACTABLE EPILEPSY WITHOUT STATUS EPILEPTICUS, UNSPECIFIED EPILEPSY TYPE (HCC): Primary | ICD-10-CM

## 2019-08-19 RX ORDER — OXCARBAZEPINE 600 MG/1
TABLET, FILM COATED ORAL
Qty: 60 TAB | Refills: 5 | Status: SHIPPED | OUTPATIENT
Start: 2019-08-19 | End: 2020-03-04

## 2019-08-19 RX ORDER — OXCARBAZEPINE 300 MG/1
TABLET, FILM COATED ORAL
Qty: 60 TAB | Refills: 5 | Status: SHIPPED | OUTPATIENT
Start: 2019-08-19 | End: 2020-05-20 | Stop reason: SDUPTHER

## 2019-08-22 ENCOUNTER — DOCUMENTATION ONLY (OUTPATIENT)
Dept: NEUROLOGY | Age: 34
End: 2019-08-22

## 2019-09-20 NOTE — TELEPHONE ENCOUNTER
Future Appointments   Date Time Provider Alex Byrne   11/19/2019 10:00 AM Renny Blevins  [facility] [address]   3/25/2020 10:00 AM Renny Blevins  [facility] [place]                         Last Appointment My Department:  5/8/2019    Please advise of refill below. Requested Prescriptions     Pending Prescriptions Disp Refills    pantoprazole (PROTONIX) 40 mg tablet 90 Tab 1     Sig: Take 1 Tab by mouth Daily (before breakfast).

## 2019-09-23 RX ORDER — PANTOPRAZOLE SODIUM 40 MG/1
40 TABLET, DELAYED RELEASE ORAL
Qty: 90 TAB | Refills: 1 | Status: SHIPPED | OUTPATIENT
Start: 2019-09-23 | End: 2020-01-06 | Stop reason: ALTCHOICE

## 2019-11-13 NOTE — PROGRESS NOTES
[facility]  Pulmonary, Critical Care, and Sleep Medicine    Name: Jorgito Rosa MRN: [id number]   : 1985 Hospital: [facility]   Date: 2019        Critical Care Progress Note    IMPRESSION:   · Recurrent seizures   · Respiratory failure and intubated for airway protection secondary to above  · [facility] Syndrome  · H/O Shunt and spine surgery  · Group home resident   · Last seizure 14 years ago  · Severe acidosis  · GI bleed       RECOMMENDATIONS:   · Vent support  · Neurology consult   · IV propofol  · Bicarb PRN   · Seizure meds per Neurology  · ICU protocols   · DVT and GI prophylaxis  · GI consult   · Guarded outlook     Subjective/History:     :  Remains intubated   GI copious secretions  Some blood   HgB stable   Awaiting GI  Will hold off on SBT/extubation if endoscopy planned       This patient has been seen and evaluated at the request of Dr. Lisa Paredes for respiratory failure. Patient is a 37 y.o. female with h/o [facility] Syndrome, seizure d/o and h/o spine surgery and shunt. Last seizure was apparently 14 yrs ago. Sees Dr. Kenneth Davis. Seizure meds were being adjusted. At group home today had a grand mal seizure and two more seizures on the way here. Seized again in ED and intubated for airway protection. Now intubated and sedated on Propofol. D/W mom and caretaker at bedside. 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  Authorizing Provider Patient tolerated port and lab  flush well. Therapy plan reviewed with patient. Verbalizes understanding. Declines copy of AVS and any medication information, verbalizes good knowledge of current plan, and has no signs or symptoms to report at this time. Next appointment made. norgestimate-ethinyl estradiol (TRINESSA, 28,) 0.18/0.215/0.25 mg-35 mcg (28) tab Take  by mouth. Yes Delbert, MD Nicolasa   loratadine (CLARITIN) 10 mg tablet Take 10 mg by mouth. Yes Other, MD Nicolasa   fluticasone propionate (FLONASE) 50 mcg/actuation nasal spray 2 Sprays by Both Nostrils route daily. Yes Delbert, MD Nicolasa   medroxyPROGESTERone (DEPO-PROVERA) 150 mg/mL injection 150 mg by IntraMUSCular route once. Yes Other, MD Nicolasa   OXcarbazepine (TRILEPTAL) 300 mg tablet Take 1 Tab by mouth two (2) times a day. 3/12/19  Yes Maria Eugenia Campos MD     Current Facility-Administered Medications   Medication Dose Route Frequency    levETIRAcetam in saline (iso-os) (KEPPRA) infusion 1,000 mg  1,000 mg IntraVENous Q12H    propofol (DIPRIVAN) infusion  0-50 mcg/kg/min IntraVENous TITRATE    sodium chloride (NS) flush 5-40 mL  5-40 mL IntraVENous Q8H    0.45% sodium chloride infusion  125 mL/hr IntraVENous CONTINUOUS    insulin lispro (HUMALOG) injection   SubCUTAneous Q6H    mupirocin (BACTROBAN) 2 % ointment   Both Nostrils Q12H    chlorhexidine (PERIDEX) 0.12 % mouthwash 15 mL  15 mL Oral Q12H    pantoprazole (PROTONIX) 40 mg in sodium chloride 0.9% 10 mL injection  40 mg IntraVENous Q12H     Allergies   Allergen Reactions    Bees [Sting, Bee] Anaphylaxis    Pertussis Vaccine,Adsorbed Other (comments)      Social History     Tobacco Use    Smoking status: Never Smoker    Smokeless tobacco: Never Used   Substance Use Topics    Alcohol use: No      History reviewed. No pertinent family history.      Review of Systems:  Unobtainable     Objective:   Vital Signs:    Visit Vitals  /73   Pulse 95   Temp 98.9 °F (37.2 °C)   Resp 16   Ht 5' 2\" (1.575 m)   Wt 75 kg (165 lb 5.5 oz)   SpO2 99%   BMI 30.24 kg/m²       O2 Device: Endotracheal tube, Ventilator   O2 Flow Rate (L/min): 2 l/min   Temp (24hrs), Av.8 °F (36.6 °C), Min:95.4 °F (35.2 °C), Max:98.9 °F (37.2 °C)       Intake/Output:   Last shift: 04/07 0701 - 04/07 1900  In: -   Out: 385 [Urine:135]  Last 3 shifts: 04/05 1901 - 04/07 0700  In: 6170.6 [I.V.:3528.3]  Out: 2320 [Urine:1870]    Intake/Output Summary (Last 24 hours) at 4/7/2019 0903  Last data filed at 4/7/2019 0902  Gross per 24 hour   Intake 3668.31 ml   Output 2705 ml   Net 963.31 ml     Hemodynamics:   PAP:   CO:     Wedge:   CI:     CVP:    SVR:       PVR:       Ventilator Settings:  Mode Rate Tidal Volume Pressure FiO2 PEEP   Assist control   500 ml  6 cm H2O 40 % 6 cm H20     Peak airway pressure: 23 cm H2O    Minute ventilation: 8.43 l/min      Physical Exam:    General:  Intubated and sedated    Head:  Normocephalic, without obvious abnormality, atraumatic. Eyes:  Conjunctivae/corneas clear. PERRL, EOMs intact. Nose: Nares normal. Septum midline. Mucosa normal. No drainage or sinus tenderness. Throat: Intubated, vent    Neck: Supple, symmetrical, trachea midline, no adenopathy, thyroid: no enlargment/tenderness/nodules, no carotid bruit and no JVD. Back:   Symmetric, no curvature. ROM normal.   Lungs:   Decreased breath sounds    Chest wall:  No tenderness or deformity. Heart:  Regular rate and rhythm, S1, S2 normal, no murmur, click, rub or gallop. Abdomen:   Copious GI secretions, coffee ground. Soft, non-tender. Bowel sounds normal. No masses,  No organomegaly. Extremities: Extremities normal, atraumatic, no cyanosis or edema. Pulses: 2+ and symmetric all extremities.    Skin: Skin color, texture, turgor normal. No rashes or lesions   Lymph nodes: Cervical, supraclavicular, and axillary nodes normal.   Neurologic: Sedated        Data:     Recent Results (from the past 24 hour(s))   EKG, 12 LEAD, INITIAL    Collection Time: 04/06/19  9:55 AM   Result Value Ref Range    Ventricular Rate 97 BPM    Atrial Rate 97 BPM    P-R Interval 170 ms    QRS Duration 90 ms    Q-T Interval 362 ms    QTC Calculation (Bezet) 459 ms    Calculated P Axis 39 degrees    Calculated R Axis -4 degrees    Calculated T Axis 27 degrees    Diagnosis       Normal sinus rhythm  Minimal voltage criteria for LVH, may be normal variant  No previous ECGs available     GLUCOSE, POC    Collection Time: 04/06/19 10:06 AM   Result Value Ref Range    Glucose (POC) 201 (H) 65 - 100 mg/dL    Performed by Amaryllis Scheuermann    POC LACTIC ACID    Collection Time: 04/06/19 10:16 AM   Result Value Ref Range    Lactic Acid (POC) >16.00 (HH) 0.40 - 2.00 mmol/L   CBC WITH AUTOMATED DIFF    Collection Time: 04/06/19 10:17 AM   Result Value Ref Range    WBC 18.4 (H) 3.6 - 11.0 K/uL    RBC 5.30 (H) 3.80 - 5.20 M/uL    HGB 15.5 11.5 - 16.0 g/dL    HCT 49.1 (H) 35.0 - 47.0 %    MCV 92.6 80.0 - 99.0 FL    MCH 29.2 26.0 - 34.0 PG    MCHC 31.6 30.0 - 36.5 g/dL    RDW 12.7 11.5 - 14.5 %    PLATELET 201 (H) 411 - 400 K/uL    MPV 10.0 8.9 - 12.9 FL    NRBC 0.0 0  WBC    ABSOLUTE NRBC 0.00 0.00 - 0.01 K/uL    NEUTROPHILS 58 32 - 75 %    BAND NEUTROPHILS 4 %    LYMPHOCYTES 28 12 - 49 %    MONOCYTES 5 5 - 13 %    EOSINOPHILS 3 0 - 7 %    BASOPHILS 0 0 - 1 %    METAMYELOCYTES 1 %    MYELOCYTES 1 %    IMMATURE GRANULOCYTES 0 0.0 - 0.5 %    ABS. NEUTROPHILS 11.4 (H) 1.8 - 8.0 K/UL    ABS. LYMPHOCYTES 5.2 (H) 0.8 - 3.5 K/UL    ABS. MONOCYTES 0.9 0.0 - 1.0 K/UL    ABS. EOSINOPHILS 0.6 (H) 0.0 - 0.4 K/UL    ABS. BASOPHILS 0.0 0.0 - 0.1 K/UL    ABS. IMM.  GRANS. 0.0 0.00 - 0.04 K/UL    DF MANUAL      RBC COMMENTS NORMOCYTIC, NORMOCHROMIC     METABOLIC PANEL, COMPREHENSIVE    Collection Time: 04/06/19 10:17 AM   Result Value Ref Range    Sodium 137 136 - 145 mmol/L    Potassium 3.6 3.5 - 5.1 mmol/L    Chloride 104 97 - 108 mmol/L    CO2 7 (LL) 21 - 32 mmol/L    Anion gap 26 (H) 5 - 15 mmol/L    Glucose 217 (H) 65 - 100 mg/dL    BUN 10 6 - 20 MG/DL    Creatinine 1.00 0.55 - 1.02 MG/DL    BUN/Creatinine ratio 10 (L) 12 - 20      GFR est AA >60 >60 ml/min/1.73m2    GFR est non-AA >60 >60 ml/min/1.73m2    Calcium 9.0 8.5 - 10.1 MG/DL    Bilirubin, total 0.2 0.2 - 1.0 MG/DL    ALT (SGPT) 18 12 - 78 U/L    AST (SGOT) 12 (L) 15 - 37 U/L    Alk. phosphatase 95 45 - 117 U/L    Protein, total 9.0 (H) 6.4 - 8.2 g/dL    Albumin 4.4 3.5 - 5.0 g/dL    Globulin 4.6 (H) 2.0 - 4.0 g/dL    A-G Ratio 1.0 (L) 1.1 - 2.2     SAMPLES BEING HELD    Collection Time: 04/06/19 10:17 AM   Result Value Ref Range    SAMPLES BEING HELD RED     COMMENT        Add-on orders for these samples will be processed based on acceptable specimen integrity and analyte stability, which may vary by analyte.    URINALYSIS W/ REFLEX CULTURE    Collection Time: 04/06/19 10:17 AM   Result Value Ref Range    Color YELLOW/STRAW      Appearance CLOUDY (A) CLEAR      Specific gravity 1.012 1.003 - 1.030      pH (UA) 5.0 5.0 - 8.0      Protein 100 (A) NEG mg/dL    Glucose NEGATIVE  NEG mg/dL    Ketone NEGATIVE  NEG mg/dL    Bilirubin NEGATIVE  NEG      Blood MODERATE (A) NEG      Urobilinogen 0.2 0.2 - 1.0 EU/dL    Nitrites NEGATIVE  NEG      Leukocyte Esterase TRACE (A) NEG      WBC 0-4 0 - 4 /hpf    RBC 0-5 0 - 5 /hpf    Epithelial cells FEW FEW /lpf    Bacteria NEGATIVE  NEG /hpf    UA:UC IF INDICATED CULTURE NOT INDICATED BY UA RESULT CNI      Amorphous Crystals 1+ (A) NEG   DRUG SCREEN, URINE    Collection Time: 04/06/19 10:17 AM   Result Value Ref Range    AMPHETAMINES NEGATIVE  NEG      BARBITURATES NEGATIVE  NEG      BENZODIAZEPINES NEGATIVE  NEG      COCAINE NEGATIVE  NEG      METHADONE NEGATIVE  NEG      OPIATES NEGATIVE  NEG      PCP(PHENCYCLIDINE) NEGATIVE  NEG      THC (TH-CANNABINOL) NEGATIVE  NEG      Drug screen comment (NOTE)    CULTURE, BLOOD, PAIRED    Collection Time: 04/06/19 10:17 AM   Result Value Ref Range    Special Requests: NO SPECIAL REQUESTS      Culture result: NO GROWTH AFTER 20 HOURS     POC G3 - PUL    Collection Time: 04/06/19  1:14 PM   Result Value Ref Range    FIO2 (POC) 100 %    pH (POC) 7.204 (LL) 7.35 - 7.45      pCO2 (POC) 44.7 35.0 - 45.0 MMHG    pO2 (POC) 421 (H) 80 - 100 MMHG    HCO3 (POC) 17.7 (L) 22 - 26 MMOL/L    sO2 (POC) 100 (H) 92 - 97 %    Base deficit (POC) 10 mmol/L    Site RIGHT RADIAL      Device: VENT      Mode ASSIST CONTROL      Tidal volume 500 ml    Set Rate 14 bpm    PEEP/CPAP (POC) 6 cmH2O    Allens test (POC) YES      Specimen type (POC) ARTERIAL      Total resp. rate 16     OCCULT BLOOD, GASTRIC    Collection Time: 04/06/19  1:40 PM   Result Value Ref Range    OCCULT BLOOD,GASTRIC POSITIVE (A) NEG      pH,GASTRIC 4 (H) 1.5 - 3.5     CALCIUM    Collection Time: 04/06/19  2:27 PM   Result Value Ref Range    Calcium 7.8 (L) 8.5 - 10.1 MG/DL   MAGNESIUM    Collection Time: 04/06/19  2:27 PM   Result Value Ref Range    Magnesium 2.5 (H) 1.6 - 2.4 mg/dL   HGB & HCT    Collection Time: 04/06/19  2:27 PM   Result Value Ref Range    HGB 13.4 11.5 - 16.0 g/dL    HCT 39.5 35.0 - 47.0 %   LACTIC ACID    Collection Time: 04/06/19  2:30 PM   Result Value Ref Range    Lactic acid 2.1 (HH) 0.4 - 2.0 MMOL/L   GLUCOSE, POC    Collection Time: 04/06/19  5:37 PM   Result Value Ref Range    Glucose (POC) 93 65 - 100 mg/dL    Performed by [address] [place] [address], POC    Collection Time: 04/07/19 12:41 AM   Result Value Ref Range    Glucose (POC) 117 (H) 65 - 100 mg/dL    Performed by Shekhar Saavedra    POC G3 - PUL    Collection Time: 04/07/19  4:08 AM   Result Value Ref Range    FIO2 (POC) 40 %    pH (POC) 7.420 7.35 - 7.45      pCO2 (POC) 24.8 (L) 35.0 - 45.0 MMHG    pO2 (POC) 161 (H) 80 - 100 MMHG    HCO3 (POC) 16.1 (L) 22 - 26 MMOL/L    sO2 (POC) 100 (H) 92 - 97 %    Base deficit (POC) 8 mmol/L    Site RIGHT RADIAL      Device: VENT      Mode ASSIST CONTROL      Tidal volume 500 ml    Set Rate 16 bpm    PEEP/CPAP (POC) 6 cmH2O    Allens test (POC) YES      Specimen type (POC) ARTERIAL      Total resp.  rate 16     CBC WITH AUTOMATED DIFF    Collection Time: 04/07/19  4:13 AM   Result Value Ref Range    WBC 14.8 (H) 3.6 - 11.0 K/uL    RBC 4.06 3.80 - 5.20 M/uL    HGB 11.5 11.5 - 16.0 g/dL    HCT 34.1 (L) 35.0 - 47.0 %    MCV 84.0 80.0 - 99.0 FL    MCH 28.3 26.0 - 34.0 PG    MCHC 33.7 30.0 - 36.5 g/dL    RDW 13.1 11.5 - 14.5 %    PLATELET 513 427 - 192 K/uL    MPV 9.7 8.9 - 12.9 FL    NRBC 0.0 0  WBC    ABSOLUTE NRBC 0.00 0.00 - 0.01 K/uL    NEUTROPHILS 79 (H) 32 - 75 %    LYMPHOCYTES 13 12 - 49 %    MONOCYTES 8 5 - 13 %    EOSINOPHILS 0 0 - 7 %    BASOPHILS 0 0 - 1 %    IMMATURE GRANULOCYTES 0 0.0 - 0.5 %    ABS. NEUTROPHILS 11.6 (H) 1.8 - 8.0 K/UL    ABS. LYMPHOCYTES 1.9 0.8 - 3.5 K/UL    ABS. MONOCYTES 1.2 (H) 0.0 - 1.0 K/UL    ABS. EOSINOPHILS 0.0 0.0 - 0.4 K/UL    ABS. BASOPHILS 0.0 0.0 - 0.1 K/UL    ABS. IMM.  GRANS. 0.1 (H) 0.00 - 0.04 K/UL    DF AUTOMATED     METABOLIC PANEL, BASIC    Collection Time: 04/07/19  4:13 AM   Result Value Ref Range    Sodium 141 136 - 145 mmol/L    Potassium 3.0 (L) 3.5 - 5.1 mmol/L    Chloride 115 (H) 97 - 108 mmol/L    CO2 17 (L) 21 - 32 mmol/L    Anion gap 9 5 - 15 mmol/L    Glucose 93 65 - 100 mg/dL    BUN 5 (L) 6 - 20 MG/DL    Creatinine 0.51 (L) 0.55 - 1.02 MG/DL    BUN/Creatinine ratio 10 (L) 12 - 20      GFR est AA >60 >60 ml/min/1.73m2    GFR est non-AA >60 >60 ml/min/1.73m2    Calcium 7.7 (L) 8.5 - 10.1 MG/DL   MAGNESIUM    Collection Time: 04/07/19  4:13 AM   Result Value Ref Range    Magnesium 2.3 1.6 - 2.4 mg/dL   PHOSPHORUS    Collection Time: 04/07/19  4:13 AM   Result Value Ref Range    Phosphorus 3.1 2.6 - 4.7 MG/DL   LACTIC ACID    Collection Time: 04/07/19  4:13 AM   Result Value Ref Range    Lactic acid 0.7 0.4 - 2.0 MMOL/L   GLUCOSE, POC    Collection Time: 04/07/19  6:06 AM   Result Value Ref Range    Glucose (POC) 96 65 - 100 mg/dL    Performed by Antione Watkins                Imaging:  I have personally reviewed the patients radiographs and have reviewed the reports:  No CXR today     D/W Nursing and mom   Total critical care time exclusive of procedures: 35  minutes  Martha Mendez MD

## 2019-11-19 ENCOUNTER — OFFICE VISIT (OUTPATIENT)
Dept: NEUROLOGY | Age: 34
End: 2019-11-19

## 2019-11-19 VITALS
HEIGHT: 62 IN | HEART RATE: 65 BPM | SYSTOLIC BLOOD PRESSURE: 122 MMHG | OXYGEN SATURATION: 98 % | DIASTOLIC BLOOD PRESSURE: 82 MMHG | BODY MASS INDEX: 30.91 KG/M2 | WEIGHT: 168 LBS

## 2019-11-19 DIAGNOSIS — G40.909 NONINTRACTABLE EPILEPSY WITHOUT STATUS EPILEPTICUS, UNSPECIFIED EPILEPSY TYPE (HCC): Primary | ICD-10-CM

## 2019-11-19 DIAGNOSIS — F42.9 OBSESSIVE-COMPULSIVE DISORDER, UNSPECIFIED TYPE: ICD-10-CM

## 2019-11-19 DIAGNOSIS — G93.49 CHRONIC STATIC ENCEPHALOPATHY: ICD-10-CM

## 2019-11-19 NOTE — PATIENT INSTRUCTIONS

## 2019-11-19 NOTE — PROGRESS NOTES
NEUROLOGY FOLLOW UP NOTE    11/19/19    Chief Complaint   Patient presents with    Follow-up     seizures        Subjective  Lori Shoemaker is a 37 y.o. female who presented to the neurology office for management of seizures. She does have [place] syndrome and was seizure free from [age] yrs of age. She had not had a seizure atleast since 2013. The patient was taking Trileptal 600 mg p.o. twice daily but the dosage was decreased and the patient end up having a seizure. Patient was hospitalized and was started on Keppra. Patient continued to have breakthrough seizures on Keppra and hence Keppra has being weaned off and Trileptal has been restarted initially at 300 mg p.o. twice daily and now on 900 mg p.o. twice daily in a week. Last seizure was in august 2019. Current Outpatient Medications   Medication Sig    pantoprazole (PROTONIX) 40 mg tablet Take 1 Tab by mouth Daily (before breakfast).  OXcarbazepine (TRILEPTAL) 300 mg tablet Take along with 600 mg tablets twice a day for a total dose of 900 p.o. twice daily    OXcarbazepine (TRILEPTAL) 600 mg tablet Take along with 300 mg tablets for a total dosage of 900 mg p.o. twice daily    OXcarbazepine (TRILEPTAL) 300 mg tablet 600 mg p.o. twice daily     No current facility-administered medications for this visit. Allergies   Allergen Reactions    Bees [Sting, Bee] Anaphylaxis    Pertussis Vaccine,Adsorbed Other (comments)       REVIEW OF SYSTEMS:   A ten system review of constitutional, cardiovascular, respiratory, musculoskeletal, endocrine, skin, SHEENT, genitourinary, psychiatric and neurologic systems was obtained and is unremarkable except as stated in HPI    EXAMINATION:   Visit Vitals  /82   Pulse 65   Ht 5' 2\" (1.575 m)   Wt 168 lb (76.2 kg)   SpO2 98%   BMI 30.73 kg/m²        General:   General appearance: Pt is in no acute distress   Distal pulses are preserved    Neurological Examination:   Mental Status: AA. Speech is fluent. Follows commands, has normal fund of knowledge, attention, short term recall, comprehension and insight. Cranial Nerves: Visual fields are full. PERRL, Extraocular movements are full. Facial sensation intact V1- V3. Facial movement intact, symmetric. Hearing intact to conversation. Palate elevates symmetrically. Shoulder shrug symmetric. Tongue midline. Motor: Strength is 5/5 in all 4 ext.     Sensation: Normal to light touch    Coordination/Cerebellar: Intact to finger-nose-finger     Laboratory review:   Results for orders placed or performed during the hospital encounter of 04/27/19   CULTURE, URINE   Result Value Ref Range    Special Requests: NO SPECIAL REQUESTS      [place] Count >100,000  COLONIES/mL        Culture result: KLEBSIELLA PNEUMONIAE (A)         Susceptibility    Klebsiella pneumoniae - KOBE     Amikacin ($) <=16 Susceptible ug/mL     Ampicillin/sulbactam ($) <=8/4 Susceptible ug/mL     Aztreonam ($$$$) <=4 Susceptible ug/mL     Cefazolin ($) <=8 Susceptible ug/mL     Cefepime ($$) <=4 Susceptible ug/mL     Cefotaxime <=2 Susceptible ug/mL     Ceftazidime ($) <=1 Susceptible ug/mL     Ceftriaxone ($) <=1 Susceptible ug/mL     Cefuroxime ($) <=4 Susceptible ug/mL     Ciprofloxacin ($) <=1 Susceptible ug/mL     Gentamicin ($) <=4 Susceptible ug/mL     Imipenem <=1 Susceptible ug/mL     Levofloxacin ($) <=2 Susceptible ug/mL     Meropenem ($$) <=1 Susceptible ug/mL     Nitrofurantoin 64 Intermediate ug/mL     Piperacillin/Tazobac ($) <=16 Susceptible ug/mL     Tobramycin ($) <=4 Susceptible ug/mL     Trimeth/Sulfa <=2/38 Susceptible ug/mL   CBC WITH AUTOMATED DIFF   Result Value Ref Range    WBC 13.0 (H) 3.6 - 11.0 K/uL    RBC 4.72 3.80 - 5.20 M/uL    HGB 13.4 11.5 - 16.0 g/dL    HCT 40.4 35.0 - 47.0 %    MCV 85.6 80.0 - 99.0 FL    MCH 28.4 26.0 - 34.0 PG    MCHC 33.2 30.0 - 36.5 g/dL    RDW 12.8 11.5 - 14.5 %    PLATELET 070 610 - 300 K/uL    MPV 9.5 8.9 - 12.9 FL    NRBC 0.0 0  WBC ABSOLUTE NRBC 0.00 0.00 - 0.01 K/uL    NEUTROPHILS 91 (H) 32 - 75 %    LYMPHOCYTES 6 (L) 12 - 49 %    MONOCYTES 3 (L) 5 - 13 %    EOSINOPHILS 0 0 - 7 %    BASOPHILS 0 0 - 1 %    IMMATURE GRANULOCYTES 0 0.0 - 0.5 %    ABS. NEUTROPHILS 11.7 (H) 1.8 - 8.0 K/UL    ABS. LYMPHOCYTES 0.8 0.8 - 3.5 K/UL    ABS. MONOCYTES 0.4 0.0 - 1.0 K/UL    ABS. EOSINOPHILS 0.0 0.0 - 0.4 K/UL    ABS. BASOPHILS 0.0 0.0 - 0.1 K/UL    ABS. IMM. GRANS. 0.0 0.00 - 0.04 K/UL    DF AUTOMATED     URINALYSIS W/ RFLX MICROSCOPIC   Result Value Ref Range    Color YELLOW/STRAW      Appearance CLEAR CLEAR      Specific gravity 1.016 1.003 - 1.030      pH (UA) 5.5 5.0 - 8.0      Protein NEGATIVE  NEG mg/dL    Glucose NEGATIVE  NEG mg/dL    Ketone NEGATIVE  NEG mg/dL    Bilirubin NEGATIVE  NEG      Blood TRACE (A) NEG      Urobilinogen 0.2 0.2 - 1.0 EU/dL    Nitrites POSITIVE (A) NEG      Leukocyte Esterase SMALL (A) NEG      WBC 0-4 0 - 4 /hpf    RBC 0-5 0 - 5 /hpf    Epithelial cells FEW FEW /lpf    Bacteria 4+ (A) NEG /hpf   METABOLIC PANEL, COMPREHENSIVE   Result Value Ref Range    Sodium 137 136 - 145 mmol/L    Potassium 3.2 (L) 3.5 - 5.1 mmol/L    Chloride 105 97 - 108 mmol/L    CO2 25 21 - 32 mmol/L    Anion gap 7 5 - 15 mmol/L    Glucose 163 (H) 65 - 100 mg/dL    BUN 5 (L) 6 - 20 MG/DL    Creatinine 0.49 (L) 0.55 - 1.02 MG/DL    BUN/Creatinine ratio 10 (L) 12 - 20      GFR est AA >60 >60 ml/min/1.73m2    GFR est non-AA >60 >60 ml/min/1.73m2    Calcium 8.4 (L) 8.5 - 10.1 MG/DL    Bilirubin, total 0.2 0.2 - 1.0 MG/DL    ALT (SGPT) 21 12 - 78 U/L    AST (SGOT) 12 (L) 15 - 37 U/L    Alk. phosphatase 78 45 - 117 U/L    Protein, total 7.3 6.4 - 8.2 g/dL    Albumin 3.7 3.5 - 5.0 g/dL    Globulin 3.6 2.0 - 4.0 g/dL    A-G Ratio 1.0 (L) 1.1 - 2.2         Imaging review:   4/8/2019  EEG  Abnormal.  Excessive beta activity seen secondary to medication.     2/19/2013: EEG had right temporal sharp waves that were improved from spike and wave complexes. Documentation review:  None    Assessment/Plan:   Radha Duff is a 37 y.o. 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 The patient's Trileptal was discontinued as she was seizure-free for a long time but she had recurrence of seizure. She was initially placed on Keppra and seizures were not controlled on that. Patient is now back on Trileptal 900 mg p.o. twice daily. She has been seizure-free since August 2019. We will continue Trileptal 900 mg p.o. twice daily. The patient is also on pantoprazole because of gastric irritation and when she was in the hospital in April 2019 it was recommended that she continues that for 8 weeks. She will discontinue her pantoprazole. Follow-up in 6 months       Over 40 minutes was spent with the patient of which > 50% of the visit was spent counseling on diagnosis, management, and treatment of the diagnosis. I did discuss about medications          ICD-10-CM ICD-9-CM    1. Nonintractable epilepsy without status epilepticus, unspecified epilepsy type ([facility] G40.909 345.90    2. 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 Obsessive-compulsive disorder, unspecified type F42.9 300.3        This note will not be viewable in MyChart.

## 2020-01-06 ENCOUNTER — TELEPHONE (OUTPATIENT)
Dept: NEUROLOGY | Age: 35
End: 2020-01-06

## 2020-01-17 ENCOUNTER — TELEPHONE (OUTPATIENT)
Dept: NEUROLOGY | Age: 35
End: 2020-01-17

## 2020-02-24 ENCOUNTER — TELEPHONE (OUTPATIENT)
Dept: NEUROLOGY | Age: 35
End: 2020-02-24

## 2020-03-02 ENCOUNTER — TELEPHONE (OUTPATIENT)
Dept: NEUROLOGY | Age: 35
End: 2020-03-02

## 2020-05-20 ENCOUNTER — OFFICE VISIT (OUTPATIENT)
Dept: NEUROLOGY | Age: 35
End: 2020-05-20

## 2020-05-20 VITALS
HEIGHT: 62 IN | OXYGEN SATURATION: 98 % | DIASTOLIC BLOOD PRESSURE: 80 MMHG | SYSTOLIC BLOOD PRESSURE: 122 MMHG | HEART RATE: 88 BPM | BODY MASS INDEX: 30.55 KG/M2 | WEIGHT: 166 LBS

## 2020-05-20 DIAGNOSIS — F42.9 OBSESSIVE-COMPULSIVE DISORDER, UNSPECIFIED TYPE: ICD-10-CM

## 2020-05-20 DIAGNOSIS — G40.909 NONINTRACTABLE EPILEPSY WITHOUT STATUS EPILEPTICUS, UNSPECIFIED EPILEPSY TYPE (HCC): Primary | ICD-10-CM

## 2020-05-20 DIAGNOSIS — G93.49 CHRONIC STATIC ENCEPHALOPATHY: ICD-10-CM

## 2020-05-20 RX ORDER — OXCARBAZEPINE 300 MG/1
900 TABLET, FILM COATED ORAL 2 TIMES DAILY
Qty: 180 TAB | Refills: 11 | Status: SHIPPED | OUTPATIENT
Start: 2020-05-20 | End: 2021-01-19

## 2020-05-20 NOTE — PROGRESS NOTES
NEUROLOGY FOLLOW UP NOTE    05/20/20    Chief Complaint   Patient presents with    Follow-up     no seizure    Seizure       Subjective  Ronak Jones is a 38 y.o. female who presented to the neurology office for management of seizures. She does have [place] syndrome and was seizure free from [age] yrs of age. She had not had a seizure atleast since 2013. The patient was taking Trileptal 600 mg p.o. twice daily but the dosage was decreased and the patient end up having a seizure. Patient was hospitalized and was started on Keppra. Patient continued to have breakthrough seizures on Keppra and hence Keppra has being weaned off and Trileptal has been restarted initially at 300 mg p.o. twice daily and now on 900 mg p.o. twice daily in a week. Last seizure was in august 2019. Current Outpatient Medications   Medication Sig    OXcarbazepine (TRILEPTAL) 600 mg tablet TAKE ONE TABLET (600MG) BY MOUTH 2 TIMES A DAY.  OXcarbazepine (TRILEPTAL) 300 mg tablet Take along with 600 mg tablets twice a day for a total dose of 900 p.o. twice daily     No current facility-administered medications for this visit. Allergies   Allergen Reactions    Bees [Sting, Bee] Anaphylaxis    Pertussis Vaccine,Adsorbed Other (comments)       REVIEW OF SYSTEMS:   A ten system review of constitutional, cardiovascular, respiratory, musculoskeletal, endocrine, skin, SHEENT, genitourinary, psychiatric and neurologic systems was obtained and is unremarkable except as stated in HPI    EXAMINATION:   Visit Vitals  /80   Pulse 88   Ht 5' 2\" (1.575 m)   Wt 166 lb (75.3 kg)   SpO2 98%   BMI 30.36 kg/m²        General:   General appearance: Pt is in no acute distress   Distal pulses are preserved    Neurological Examination:   Mental Status: AA. Speech is fluent. Follows commands, has normal fund of knowledge, attention, short term recall, comprehension and insight. Cranial Nerves: Visual fields are full.  PERRL, Extraocular movements are full. Facial sensation intact V1- V3. Facial movement intact, symmetric. Hearing intact to conversation. Palate elevates symmetrically. Shoulder shrug symmetric. Tongue midline. Motor: Strength is 5/5 in all 4 ext.     Sensation: Normal to light touch    Coordination/Cerebellar: Intact to finger-nose-finger     Laboratory review:   Results for orders placed or performed during the hospital encounter of 04/27/19   CULTURE, URINE   Result Value Ref Range    Special Requests: NO SPECIAL REQUESTS      [place] Count >100,000  COLONIES/mL        Culture result: KLEBSIELLA PNEUMONIAE (A)         Susceptibility    Klebsiella pneumoniae - KOBE     Amikacin ($) <=16 Susceptible ug/mL     Ampicillin/sulbactam ($) <=8/4 Susceptible ug/mL     Aztreonam ($$$$) <=4 Susceptible ug/mL     Cefazolin ($) <=8 Susceptible ug/mL     Cefepime ($$) <=4 Susceptible ug/mL     Cefotaxime <=2 Susceptible ug/mL     Ceftazidime ($) <=1 Susceptible ug/mL     Ceftriaxone ($) <=1 Susceptible ug/mL     Cefuroxime ($) <=4 Susceptible ug/mL     Ciprofloxacin ($) <=1 Susceptible ug/mL     Gentamicin ($) <=4 Susceptible ug/mL     Imipenem <=1 Susceptible ug/mL     Levofloxacin ($) <=2 Susceptible ug/mL     Meropenem ($$) <=1 Susceptible ug/mL     Nitrofurantoin 64 Intermediate ug/mL     Piperacillin/Tazobac ($) <=16 Susceptible ug/mL     Tobramycin ($) <=4 Susceptible ug/mL     Trimeth/Sulfa <=2/38 Susceptible ug/mL   CBC WITH AUTOMATED DIFF   Result Value Ref Range    WBC 13.0 (H) 3.6 - 11.0 K/uL    RBC 4.72 3.80 - 5.20 M/uL    HGB 13.4 11.5 - 16.0 g/dL    HCT 40.4 35.0 - 47.0 %    MCV 85.6 80.0 - 99.0 FL    MCH 28.4 26.0 - 34.0 PG    MCHC 33.2 30.0 - 36.5 g/dL    RDW 12.8 11.5 - 14.5 %    PLATELET 828 502 - 167 K/uL    MPV 9.5 8.9 - 12.9 FL    NRBC 0.0 0  WBC    ABSOLUTE NRBC 0.00 0.00 - 0.01 K/uL    NEUTROPHILS 91 (H) 32 - 75 %    LYMPHOCYTES 6 (L) 12 - 49 %    MONOCYTES 3 (L) 5 - 13 %    EOSINOPHILS 0 0 - 7 %    BASOPHILS 0 0 - 1 %    IMMATURE GRANULOCYTES 0 0.0 - 0.5 %    ABS. NEUTROPHILS 11.7 (H) 1.8 - 8.0 K/UL    ABS. LYMPHOCYTES 0.8 0.8 - 3.5 K/UL    ABS. MONOCYTES 0.4 0.0 - 1.0 K/UL    ABS. EOSINOPHILS 0.0 0.0 - 0.4 K/UL    ABS. BASOPHILS 0.0 0.0 - 0.1 K/UL    ABS. IMM. GRANS. 0.0 0.00 - 0.04 K/UL    DF AUTOMATED     URINALYSIS W/ RFLX MICROSCOPIC   Result Value Ref Range    Color YELLOW/STRAW      Appearance CLEAR CLEAR      Specific gravity 1.016 1.003 - 1.030      pH (UA) 5.5 5.0 - 8.0      Protein NEGATIVE  NEG mg/dL    Glucose NEGATIVE  NEG mg/dL    Ketone NEGATIVE  NEG mg/dL    Bilirubin NEGATIVE  NEG      Blood TRACE (A) NEG      Urobilinogen 0.2 0.2 - 1.0 EU/dL    Nitrites POSITIVE (A) NEG      Leukocyte Esterase SMALL (A) NEG      WBC 0-4 0 - 4 /hpf    RBC 0-5 0 - 5 /hpf    Epithelial cells FEW FEW /lpf    Bacteria 4+ (A) NEG /hpf   METABOLIC PANEL, COMPREHENSIVE   Result Value Ref Range    Sodium 137 136 - 145 mmol/L    Potassium 3.2 (L) 3.5 - 5.1 mmol/L    Chloride 105 97 - 108 mmol/L    CO2 25 21 - 32 mmol/L    Anion gap 7 5 - 15 mmol/L    Glucose 163 (H) 65 - 100 mg/dL    BUN 5 (L) 6 - 20 MG/DL    Creatinine 0.49 (L) 0.55 - 1.02 MG/DL    BUN/Creatinine ratio 10 (L) 12 - 20      GFR est AA >60 >60 ml/min/1.73m2    GFR est non-AA >60 >60 ml/min/1.73m2    Calcium 8.4 (L) 8.5 - 10.1 MG/DL    Bilirubin, total 0.2 0.2 - 1.0 MG/DL    ALT (SGPT) 21 12 - 78 U/L    AST (SGOT) 12 (L) 15 - 37 U/L    Alk. phosphatase 78 45 - 117 U/L    Protein, total 7.3 6.4 - 8.2 g/dL    Albumin 3.7 3.5 - 5.0 g/dL    Globulin 3.6 2.0 - 4.0 g/dL    A-G Ratio 1.0 (L) 1.1 - 2.2         Imaging review:   4/8/2019  EEG  Abnormal.  Excessive beta activity seen secondary to medication. 2/19/2013: EEG had right temporal sharp waves that were improved from spike and wave complexes. Documentation review:  None    Assessment/Plan:   Gómez Cazares is a 38 y.o. 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   The patient's Trileptal was discontinued as she was seizure-free for a long time but she had recurrence of seizure. She was initially placed on Keppra and seizures were not controlled on that. Patient is now back on Trileptal 900 mg p.o. twice daily. She has been seizure-free since August 2019. We will continue Trileptal 900 mg p.o. twice daily. Follow-up in 12 months       Over 25 minutes was spent with the patient of which > 50% of the visit was spent counseling on diagnosis, management, and treatment of the diagnosis. I did discuss about medications          ICD-10-CM ICD-9-CM    1. Nonintractable epilepsy without status epilepticus, unspecified epilepsy type ([facility] G40.909 345.90    2. 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 Obsessive-compulsive disorder, unspecified type F42.9 300.3        This note will not be viewable in MyChart.

## 2020-06-25 ENCOUNTER — TELEPHONE (OUTPATIENT)
Dept: NEUROLOGY | Age: 35
End: 2020-06-25

## 2020-11-03 ENCOUNTER — DOCUMENTATION ONLY (OUTPATIENT)
Dept: NEUROLOGY | Age: 35
End: 2020-11-03

## 2020-11-03 NOTE — PROGRESS NOTES
[facility] called stating patient had a seizure today 11/3/2020 @ 8:15 am for 3 min vitals were high but within normal range

## 2020-11-24 ENCOUNTER — TELEPHONE (OUTPATIENT)
Dept: NEUROLOGY | Age: 35
End: 2020-11-24

## 2020-11-24 NOTE — TELEPHONE ENCOUNTER
Oxcarbazepine PA request sent to [facility] - marked urgent. I called her pharmacy to verify she is on generic - last fill was generic. Status is pending.

## 2020-11-24 NOTE — TELEPHONE ENCOUNTER
Pt's mother called the office to request a prior authorization for the pt's Trileptal. She is almost out of medication.

## 2020-12-01 NOTE — TELEPHONE ENCOUNTER
Trileptal PA sent to [facility] [facility] via [address] Key: [id number] - PA Case ID: [id number]     Status is pending.

## 2020-12-02 ENCOUNTER — TELEPHONE (OUTPATIENT)
Dept: NEUROLOGY | Age: 35
End: 2020-12-02

## 2020-12-02 NOTE — TELEPHONE ENCOUNTER
Trileptal approval to 12/31/21 by [facility]. Faxed to [facility].

## 2021-01-19 DIAGNOSIS — G40.909 NONINTRACTABLE EPILEPSY WITHOUT STATUS EPILEPTICUS, UNSPECIFIED EPILEPSY TYPE (HCC): ICD-10-CM

## 2021-01-19 RX ORDER — OXCARBAZEPINE 300 MG/1
TABLET, FILM COATED ORAL
Qty: 180 TAB | Refills: 0 | Status: SHIPPED | OUTPATIENT
Start: 2021-01-19 | End: 2021-06-23

## 2021-04-20 ENCOUNTER — OFFICE VISIT (OUTPATIENT)
Dept: NEUROLOGY | Age: 36
End: 2021-04-20
Payer: MEDICARE

## 2021-04-20 VITALS
WEIGHT: 168.4 LBS | RESPIRATION RATE: 16 BRPM | SYSTOLIC BLOOD PRESSURE: 112 MMHG | HEIGHT: 63 IN | DIASTOLIC BLOOD PRESSURE: 78 MMHG | OXYGEN SATURATION: 99 % | BODY MASS INDEX: 29.84 KG/M2 | TEMPERATURE: 98.1 F | HEART RATE: 78 BPM

## 2021-04-20 DIAGNOSIS — G40.909 NONINTRACTABLE EPILEPSY WITHOUT STATUS EPILEPTICUS, UNSPECIFIED EPILEPSY TYPE (HCC): Primary | ICD-10-CM

## 2021-04-20 DIAGNOSIS — F42.9 OBSESSIVE-COMPULSIVE DISORDER, UNSPECIFIED TYPE: ICD-10-CM

## 2021-04-20 PROCEDURE — G8432 DEP SCR NOT DOC, RNG: HCPCS | Performed by: PSYCHIATRY & NEUROLOGY

## 2021-04-20 PROCEDURE — G8427 DOCREV CUR MEDS BY ELIG CLIN: HCPCS | Performed by: PSYCHIATRY & NEUROLOGY

## 2021-04-20 PROCEDURE — 99214 OFFICE O/P EST MOD 30 MIN: CPT | Performed by: PSYCHIATRY & NEUROLOGY

## 2021-04-20 PROCEDURE — G8417 CALC BMI ABV UP PARAM F/U: HCPCS | Performed by: PSYCHIATRY & NEUROLOGY

## 2021-04-20 NOTE — PROGRESS NOTES
NEUROLOGY FOLLOW UP NOTE    04/20/21    Chief Complaint   Patient presents with    Follow-up    Seizure     in november due to not having medication, no seizure since then        Subjective  Milagros Rust is a 43 y.o. female who presented to the neurology office for management of seizures. She does have [place] syndrome and was seizure free from [age] yrs of age. She had not had a seizure atleast since 2013. The patient was taking Trileptal 600 mg p.o. twice daily but the dosage was decreased and the patient end up having a seizure. Patient was hospitalized and was started on Keppra. Patient continued to have breakthrough seizures on Keppra and hence Keppra has being weaned off and Trileptal has been restarted initially at 300 mg p.o. twice daily and now on 900 mg p.o. twice daily in a week. Last seizure was in august 2019. Interval history:  She did have a seizure in November 2020 when she ran out of medications. She is now again on Trileptal 900 mg p.o. twice daily and is doing well. Current Outpatient Medications   Medication Sig    OXcarbazepine (TRILEPTAL) 300 mg tablet TAKE 3 TABLETS BY MOUTH TWICE DAILY     No current facility-administered medications for this visit.       Allergies   Allergen Reactions    Bees [Sting, Bee] Anaphylaxis    Pertussis Vaccine,Adsorbed Other (comments)       REVIEW OF SYSTEMS:   A ten system review of constitutional, cardiovascular, respiratory, musculoskeletal, endocrine, skin, SHEENT, genitourinary, psychiatric and neurologic systems was obtained and is unremarkable except as stated in HPI    EXAMINATION:   Visit Vitals  /78 (BP 1 Location: Left arm, BP Patient Position: Sitting, BP Cuff Size: Adult)   Pulse 78   Temp 98.1 °F (36.7 °C)   Resp 16   Ht 5' 2.5\" (1.588 m)   Wt 168 lb 6.4 oz (76.4 kg)   SpO2 99%   BMI 30.31 kg/m²        General:   General appearance: Pt is in no acute distress   Distal pulses are preserved    Neurological Examination:   Mental Status: AA. Speech is fluent. Follows commands, has normal fund of knowledge, attention, short term recall, comprehension and insight. Cranial Nerves: Visual fields are full. PERRL, Extraocular movements are full. Facial sensation intact V1- V3. Facial movement intact, symmetric. Hearing intact to conversation. Palate elevates symmetrically. Shoulder shrug symmetric. Tongue midline. Motor: Strength is 5/5 in all 4 ext.     Sensation: Normal to light touch    Coordination/Cerebellar: Intact to finger-nose-finger     Laboratory review:   Results for orders placed or performed during the hospital encounter of 04/27/19   CULTURE, URINE    Specimen: Cath Urine   Result Value Ref Range    Special Requests: NO SPECIAL REQUESTS      [place] Count >100,000  COLONIES/mL        Culture result: KLEBSIELLA PNEUMONIAE (A)         Susceptibility    Klebsiella pneumoniae - KOBE     Amikacin ($) <=16 Susceptible ug/mL     Ampicillin/sulbactam ($) <=8/4 Susceptible ug/mL     Aztreonam ($$$$) <=4 Susceptible ug/mL     Cefazolin ($) <=8 Susceptible ug/mL     Cefepime ($$) <=4 Susceptible ug/mL     Cefotaxime <=2 Susceptible ug/mL     Ceftazidime ($) <=1 Susceptible ug/mL     Ceftriaxone ($) <=1 Susceptible ug/mL     Cefuroxime ($) <=4 Susceptible ug/mL     Ciprofloxacin ($) <=1 Susceptible ug/mL     Gentamicin ($) <=4 Susceptible ug/mL     Imipenem <=1 Susceptible ug/mL     Levofloxacin ($) <=2 Susceptible ug/mL     Meropenem ($$) <=1 Susceptible ug/mL     Nitrofurantoin 64 Intermediate ug/mL     Piperacillin/Tazobac ($) <=16 Susceptible ug/mL     Tobramycin ($) <=4 Susceptible ug/mL     Trimeth/Sulfa <=2/38 Susceptible ug/mL   CBC WITH AUTOMATED DIFF   Result Value Ref Range    WBC 13.0 (H) 3.6 - 11.0 K/uL    RBC 4.72 3.80 - 5.20 M/uL    HGB 13.4 11.5 - 16.0 g/dL    HCT 40.4 35.0 - 47.0 %    MCV 85.6 80.0 - 99.0 FL    MCH 28.4 26.0 - 34.0 PG    MCHC 33.2 30.0 - 36.5 g/dL    RDW 12.8 11.5 - 14.5 %    PLATELET 932 878 - 599 K/uL MPV 9.5 8.9 - 12.9 FL    NRBC 0.0 0  WBC    ABSOLUTE NRBC 0.00 0.00 - 0.01 K/uL    NEUTROPHILS 91 (H) 32 - 75 %    LYMPHOCYTES 6 (L) 12 - 49 %    MONOCYTES 3 (L) 5 - 13 %    EOSINOPHILS 0 0 - 7 %    BASOPHILS 0 0 - 1 %    IMMATURE GRANULOCYTES 0 0.0 - 0.5 %    ABS. NEUTROPHILS 11.7 (H) 1.8 - 8.0 K/UL    ABS. LYMPHOCYTES 0.8 0.8 - 3.5 K/UL    ABS. MONOCYTES 0.4 0.0 - 1.0 K/UL    ABS. EOSINOPHILS 0.0 0.0 - 0.4 K/UL    ABS. BASOPHILS 0.0 0.0 - 0.1 K/UL    ABS. IMM. GRANS. 0.0 0.00 - 0.04 K/UL    DF AUTOMATED     URINALYSIS W/ RFLX MICROSCOPIC   Result Value Ref Range    Color YELLOW/STRAW      Appearance CLEAR CLEAR      Specific gravity 1.016 1.003 - 1.030      pH (UA) 5.5 5.0 - 8.0      Protein NEGATIVE  NEG mg/dL    Glucose NEGATIVE  NEG mg/dL    Ketone NEGATIVE  NEG mg/dL    Bilirubin NEGATIVE  NEG      Blood TRACE (A) NEG      Urobilinogen 0.2 0.2 - 1.0 EU/dL    Nitrites POSITIVE (A) NEG      Leukocyte Esterase SMALL (A) NEG      WBC 0-4 0 - 4 /hpf    RBC 0-5 0 - 5 /hpf    Epithelial cells FEW FEW /lpf    Bacteria 4+ (A) NEG /hpf   METABOLIC PANEL, COMPREHENSIVE   Result Value Ref Range    Sodium 137 136 - 145 mmol/L    Potassium 3.2 (L) 3.5 - 5.1 mmol/L    Chloride 105 97 - 108 mmol/L    CO2 25 21 - 32 mmol/L    Anion gap 7 5 - 15 mmol/L    Glucose 163 (H) 65 - 100 mg/dL    BUN 5 (L) 6 - 20 MG/DL    Creatinine 0.49 (L) 0.55 - 1.02 MG/DL    BUN/Creatinine ratio 10 (L) 12 - 20      GFR est AA >60 >60 ml/min/1.73m2    GFR est non-AA >60 >60 ml/min/1.73m2    Calcium 8.4 (L) 8.5 - 10.1 MG/DL    Bilirubin, total 0.2 0.2 - 1.0 MG/DL    ALT (SGPT) 21 12 - 78 U/L    AST (SGOT) 12 (L) 15 - 37 U/L    Alk. phosphatase 78 45 - 117 U/L    Protein, total 7.3 6.4 - 8.2 g/dL    Albumin 3.7 3.5 - 5.0 g/dL    Globulin 3.6 2.0 - 4.0 g/dL    A-G Ratio 1.0 (L) 1.1 - 2.2         Imaging review:   4/8/2019  EEG  Abnormal.  Excessive beta activity seen secondary to medication.     2/19/2013: EEG had right temporal sharp waves that were improved from spike and wave complexes. Documentation review:  None    Assessment/Plan:   Angie Nichols is a 43 y.o. 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 The patient's Trileptal was discontinued as she was seizure-free for a long time but she had recurrence of seizure. She was initially placed on Keppra and seizures were not controlled on that. Patient is now back on Trileptal 900 mg p.o. twice daily. She has been seizure-free since November 2020. We will continue Trileptal 900 mg p.o. twice daily. Follow-up in 12 months           ICD-10-CM ICD-9-CM    1. Nonintractable epilepsy without status epilepticus, unspecified epilepsy type ([facility]  G40.909 345.90    2. Obsessive-compulsive disorder, unspecified type  F42.9 300.3        This note will not be viewable in [employer].

## 2021-04-20 NOTE — PROGRESS NOTES
Chief Complaint   Patient presents with    Follow-up    Seizure     in november due to not having medication, no seizure since then

## 2021-06-23 DIAGNOSIS — G40.909 NONINTRACTABLE EPILEPSY WITHOUT STATUS EPILEPTICUS, UNSPECIFIED EPILEPSY TYPE (HCC): ICD-10-CM

## 2021-06-23 RX ORDER — OXCARBAZEPINE 300 MG/1
TABLET, FILM COATED ORAL
Qty: 180 TABLET | Refills: 0 | Status: SHIPPED | OUTPATIENT
Start: 2021-06-23 | End: 2021-07-27 | Stop reason: SDUPTHER

## 2021-07-26 DIAGNOSIS — G40.909 NONINTRACTABLE EPILEPSY WITHOUT STATUS EPILEPTICUS, UNSPECIFIED EPILEPSY TYPE (HCC): ICD-10-CM

## 2021-07-27 ENCOUNTER — TELEPHONE (OUTPATIENT)
Dept: NEUROLOGY | Age: 36
End: 2021-07-27

## 2021-07-27 ENCOUNTER — TRANSCRIBE ORDER (OUTPATIENT)
Dept: NEUROLOGY | Age: 36
End: 2021-07-27

## 2021-07-27 DIAGNOSIS — G40.909 NONINTRACTABLE EPILEPSY WITHOUT STATUS EPILEPTICUS, UNSPECIFIED EPILEPSY TYPE (HCC): ICD-10-CM

## 2021-07-27 DIAGNOSIS — G40.909 NONINTRACTABLE EPILEPSY WITHOUT STATUS EPILEPTICUS, UNSPECIFIED EPILEPSY TYPE (HCC): Primary | ICD-10-CM

## 2021-07-27 NOTE — TELEPHONE ENCOUNTER
----- Message from Gloria Rodriguez sent at 7/27/2021  2:12 PM EDT -----  Regarding: NOEL/TELEPHONE  Contact: [phone]  General Message/Vendor Calls    Caller's first and last name: Sathya Arredondo (Parent)      Reason for call: Status of refill request  Oxcarbazepine      Callback required yes/no and why: Yes      Best contact number(s): [phone]      Details to clarify the request: Sathya Arredondo (mom) calling on behalf of the patient for the status of a prescription request for Oxcarbazepine. Per Mrs. Roblero it is very importation this is received by Thursday this week because of seizures.       Gloria Rodriguez

## 2021-07-28 ENCOUNTER — TRANSCRIBE ORDER (OUTPATIENT)
Dept: NEUROLOGY | Age: 36
End: 2021-07-28

## 2021-07-28 RX ORDER — OXCARBAZEPINE 300 MG/1
900 TABLET, FILM COATED ORAL 2 TIMES DAILY
Qty: 360 TABLET | Refills: 2 | Status: SHIPPED | OUTPATIENT
Start: 2021-07-28 | End: 2022-04-15

## 2021-07-28 NOTE — TELEPHONE ENCOUNTER
Pt is requesting a refill of her seizure medication, pt will be out of medication tomorrow. Do we have a ETA on when this prescription will be sent to the pharmacy? The pt's mother is worried that pt will have another seizure if she doesnt get the medication.

## 2021-07-30 RX ORDER — OXCARBAZEPINE 300 MG/1
TABLET, FILM COATED ORAL
Qty: 180 TABLET | Refills: 0 | Status: SHIPPED | OUTPATIENT
Start: 2021-07-30 | End: 2022-04-15

## 2021-07-31 RX ORDER — OXCARBAZEPINE 300 MG/1
TABLET, FILM COATED ORAL
Qty: 180 TABLET | Refills: 0 | Status: SHIPPED | OUTPATIENT
Start: 2021-07-31 | End: 2022-04-15

## 2021-07-31 RX ORDER — OXCARBAZEPINE 300 MG/1
TABLET, FILM COATED ORAL
Qty: 180 TABLET | Refills: 0 | Status: SHIPPED | OUTPATIENT
Start: 2021-07-31 | End: 2022-03-24 | Stop reason: SDUPTHER

## 2022-03-18 PROBLEM — G40.901 STATUS EPILEPTICUS (HCC): Status: ACTIVE | Noted: 2019-04-06

## 2022-03-24 DIAGNOSIS — G40.909 NONINTRACTABLE EPILEPSY WITHOUT STATUS EPILEPTICUS, UNSPECIFIED EPILEPSY TYPE (HCC): ICD-10-CM

## 2022-03-24 RX ORDER — OXCARBAZEPINE 300 MG/1
TABLET, FILM COATED ORAL
Qty: 180 TABLET | Refills: 0 | Status: SHIPPED | OUTPATIENT
Start: 2022-03-24 | End: 2022-04-15

## 2022-03-24 NOTE — TELEPHONE ENCOUNTER
Needs refill on trileptal because she is about out of them.    Future Appointments   Date Time Provider Alex Christinei   4/15/2022  9:40 AM Marisela Flores DO [facility]                         Last Appointment My Department:  4/20/2021 Dr. Mina Marcos and set for 1 year follow ups      Requested Prescriptions     Pending Prescriptions Disp Refills    OXcarbazepine (TRILEPTAL) 300 mg tablet 180 Tablet 0     Sig: TAKE 3 TABLETS BY MOUTH TWICE DAILY

## 2022-03-24 NOTE — TELEPHONE ENCOUNTER
Patients mother called in regards to a refill request form faxed over from [address] asked of script can be sent over today so medication can be delivered tomorrow

## 2022-04-14 NOTE — PROGRESS NOTES
Neurology Note    Patient ID:  Sarina Vides  [id number]  86 y.o.  1985      Date of Consultation:  April 15, 2022            Assessment and Plan:    Addendum to the note due to complications with technology and editing:    Chronic static encephalopathy with epilepsy:    Continue Trileptal 900 mg twice a day. Attempting to find laboratory results from most recent office visit. If unable to find, will need CBC, comprehensive metabolic panel. Subjective: I have seizure       History of Present Illness:   Sarina Vides is a 42 y.o. female who returns to the neurology clinic at [facility] for evaluation. She was last seen in the clinic approximately 1 year ago by a former neurologist, Dr. Mariela Pham. The patient has a diagnosis of West syndrome and was seizure-free in the past from the age of [age] but then did have a last seizure reported in November 2020. At her last visit, she was taking Trileptal 900 mg twice a day. She also was previously on Keppra    She had an EEG noted in 2013 which had right temporal sharp waves  She also had an EEG in April 2019 which revealed excessive beta activity secondary to medication. The patient presents with a caregiver from the group home which provides additional information. Also, the patient's mother was on the phone during the visit today and provided additional information and added additional questions. Since the patient was last here, she has not had any breakthrough seizures. She continues to take her Trileptal 900 mg twice a day. She has not missed any doses of the medication. She does continue to work at [employer] 3 days a week. She is doing well with this. She continues to live in a group home where she lives in a cottage with 6 people in the house. She shares a bathroom in a kitchen. Jacklyn Larios is prepared for them. There has been no concerns raised at the facility.   The patient has had insomnia recently and her primary care doctor did recommend starting her on melatonin but she has not done this yet. The representative from the group home did state that she had serology performed in the last couple weeks but they are unsure the labs that were obtained. She has a nurse practitioner in Dr. Angelika Reese office. [facility].        Past Medical History:   Diagnosis Date    Brain damage 1983    related to live vaccination for Tdap    Nausea & vomiting     Neurological disorder 1983    Epilepsy    Seizures ([address]     last sz at 22years old 08/15/2018        Past Surgical History:   Procedure Laterality Date    HX BACK SURGERY      scoliosis rods and pins    HX HEENT Bilateral 1986    eye muscle tightening           No family history of seizures. Social History     Tobacco Use    Smoking status: Never Smoker    Smokeless tobacco: Never Used   Substance Use Topics    Alcohol use: No        Allergies   Allergen Reactions    Bees [Sting, Bee] Anaphylaxis    Pertussis Vaccine,Adsorbed Other (comments)        Prior to Admission medications    Medication Sig Start Date End Date Taking?  Authorizing Provider   OXcarbazepine (TRILEPTAL) 300 mg tablet TAKE 3 TABLETS BY MOUTH TWICE DAILY 4/15/22  Yes Carson Flores DO       Review of Systems:    General, constitutional: negative  Eyes, vision: negative  Ears, nose, throat: negative  Cardiovascular, heart: negative  Respiratory: negative  Gastrointestinal: negative  Genitourinary: negative  Musculoskeletal: negative  Skin and integumentary: negative  Psychiatric: negative  Endocrine: negative  Neurological: negative, except for HPI  Hematologic/lymphatic: negative  Allergy/immunology: negative      Objective:     Visit Vitals  /82   Pulse 82   Temp 97.3 °F (36.3 °C) (Temporal)   Resp 18   Wt 164 lb (74.4 kg)   SpO2 98%   BMI 29.52 kg/m²       Physical Exam:      General:  appears well nourished in no acute distress  Neck: no carotid bruits  Lungs: clear to auscultation  Heart:  no murmurs, regular rate  Lower extremity: peripheral pulses palpable and no edema  Skin: intact    Neurological exam:    Patient is awake and alert. She has decreased attention and concentration. She has decreased fund of knowledge. She can follow simple 1 and two-step commands. Her speech is clear and comprehensible. From talking with the representative from the group home, her cognition is at baseline. Cranial nerves:   II-XII were tested    Perrrla    Visual fields were full  Disconjugate eye movements   Facial sensation:  normal and symmetric  Facial motor: normal and symmetric  Hearing intact  SCM strength intact  Tongue: midline without fasciculations    Motor: Tone normal  Pronator drift is absent  No evidence of fasciculations    Strength testing:   deltoid triceps biceps Wrist ext. Wrist flex. intrinsics Hip flex. Hip ext. Knee ext. Knee flex Dorsi flex Plantar flex   Right 5 5 5 5 5 5 5 5 5 5 5 5   Left 5 5 5 5 5 5 5 5 5 5 5 5         Sensory:  Upper extremity: intact to pp,  Lower extremity: intact to pp,    Reflexes:    Right Left  Biceps  2 2  Triceps 2 2  Brachiorad. 2 2  Patella  2 2  Achilles 2 2      Cerebellar testing:  no tremor apparent, finger/nose and anabella were intact. slow      Gait: steady. Labs:     Lab Results   Component Value Date/Time    Hemoglobin A1c 5.7 04/07/2019 04:15 AM    Sodium 137 04/28/2019 12:42 AM    Potassium 3.2 (L) 04/28/2019 12:42 AM    Chloride 105 04/28/2019 12:42 AM    Glucose 163 (H) 04/28/2019 12:42 AM    BUN 5 (L) 04/28/2019 12:42 AM    Creatinine 0.49 (L) 04/28/2019 12:42 AM    Calcium 8.4 (L) 04/28/2019 12:42 AM    WBC 13.0 (H) 04/28/2019 12:42 AM    HCT 40.4 04/28/2019 12:42 AM    HGB 13.4 04/28/2019 12:42 AM    PLATELET 308 59/19/4351 12:42 AM       Imaging:    No results found for this or any previous visit.       Results from [place] encounter on 04/15/19    CT HEAD WO CONT    Narrative  EXAM: CT HEAD WO CONT    INDICATION: Forehead injury during seizure today. Epilepsy. COMPARISON: CT head on 4/6/2019    TECHNIQUE: Noncontrast head CT. Coronal and sagittal reformats. CT dose  reduction was achieved through the use of a standardized protocol tailored for  this examination and automatic exposure control for dose modulation. FINDINGS: The ventricles and sulci are age-appropriate without hydrocephalus. There is no mass effect or midline shift. There is no intracranial hemorrhage or  extra-axial fluid collection. There is no abnormal area of decreased density to  suggest infarct. The calvarium is intact. The visualized paranasal sinuses and mastoid air cells  are clear. Impression  IMPRESSION:    No acute intracranial abnormality on this noncontrast head CT. No change. The patient should return to clinic in one year to see Simone Murray medication: yes, side effects were discussed    I spent  35  minutes on the day of the encounter preparing the office visit by reviewing medical records, obtaining a history, performing examination, counseling and educating the patient and individual from group home on diagnosis, ordering medicationsdocumenting in the clinical medical record, and coordinating the care for the patient. I also spent time trying to obtain recent laboratory results. If we do not have updated CBC, comprehensive metabolic panel, and Trileptal level, she will need to get these obtained  The patient had the ability to ask questions and all questions were answered.       Patient Active Problem List   Diagnosis Code    Status epilepticus ([facility] G40.901                   Signed By:  Krystle Chaney DO FAAN    April 15, 2022

## 2022-04-15 ENCOUNTER — OFFICE VISIT (OUTPATIENT)
Dept: NEUROLOGY | Age: 37
End: 2022-04-15
Payer: MEDICARE

## 2022-04-15 VITALS
SYSTOLIC BLOOD PRESSURE: 138 MMHG | OXYGEN SATURATION: 98 % | WEIGHT: 164 LBS | DIASTOLIC BLOOD PRESSURE: 82 MMHG | BODY MASS INDEX: 29.52 KG/M2 | HEART RATE: 82 BPM | TEMPERATURE: 97.3 F | RESPIRATION RATE: 18 BRPM

## 2022-04-15 DIAGNOSIS — G93.49 CHRONIC STATIC ENCEPHALOPATHY: Primary | ICD-10-CM

## 2022-04-15 DIAGNOSIS — G40.909 NONINTRACTABLE EPILEPSY WITHOUT STATUS EPILEPTICUS, UNSPECIFIED EPILEPSY TYPE (HCC): ICD-10-CM

## 2022-04-15 PROCEDURE — G8417 CALC BMI ABV UP PARAM F/U: HCPCS | Performed by: PSYCHIATRY & NEUROLOGY

## 2022-04-15 PROCEDURE — G8510 SCR DEP NEG, NO PLAN REQD: HCPCS | Performed by: PSYCHIATRY & NEUROLOGY

## 2022-04-15 PROCEDURE — 99214 OFFICE O/P EST MOD 30 MIN: CPT | Performed by: PSYCHIATRY & NEUROLOGY

## 2022-04-15 PROCEDURE — G8427 DOCREV CUR MEDS BY ELIG CLIN: HCPCS | Performed by: PSYCHIATRY & NEUROLOGY

## 2022-04-15 RX ORDER — OXCARBAZEPINE 300 MG/1
TABLET, FILM COATED ORAL
Qty: 540 TABLET | Refills: 3 | Status: SHIPPED | OUTPATIENT
Start: 2022-04-15 | End: 2022-08-25

## 2022-07-29 ENCOUNTER — TELEPHONE (OUTPATIENT)
Dept: NEUROLOGY | Age: 37
End: 2022-07-29

## 2022-07-29 DIAGNOSIS — G40.909 NONINTRACTABLE EPILEPSY WITHOUT STATUS EPILEPTICUS, UNSPECIFIED EPILEPSY TYPE (HCC): Primary | ICD-10-CM

## 2022-07-29 NOTE — TELEPHONE ENCOUNTER
Mimi w/ [address] called to let Dr. Yu Wolf know that Pt recently had 3 seizures and they are a bit concerned. Requested a call back from Nurse.  [phone]

## 2022-07-29 NOTE — TELEPHONE ENCOUNTER
Attempted to contact [address] back. Spoke with someone else who stated she left for the day. Asked to speak with a nurse or caregiver for the patient, she would not transfer me to anyone and said to call back on Monday.

## 2022-08-01 ENCOUNTER — TELEPHONE (OUTPATIENT)
Dept: NEUROLOGY | Age: 37
End: 2022-08-01

## 2022-08-01 NOTE — TELEPHONE ENCOUNTER
Spoke with Arina at [facility] [employer] and patients Mother - on [address]  Patient went to [facility] on Friday. States the Dr there consulted with you. Patients mother states they did lab work. Questions: Do you need a copy of that lab work? There are lab orders on my desk, when do you want her to have those done and do you want patient to follow up? Next appt is 3/2/23 with Briana. Please advise. Zena Solano

## 2022-08-02 NOTE — TELEPHONE ENCOUNTER
Spoke with Arina at [facility]. Advised of Dr Lauren Dockery comments and recommendations.  Arina states she will take patient to get labs done on Thursday 8/4/22 at [facility]

## 2022-08-24 DIAGNOSIS — G40.909 NONINTRACTABLE EPILEPSY WITHOUT STATUS EPILEPTICUS, UNSPECIFIED EPILEPSY TYPE (HCC): ICD-10-CM

## 2022-08-25 RX ORDER — OXCARBAZEPINE 300 MG/1
TABLET, FILM COATED ORAL
Qty: 180 TABLET | Refills: 0 | Status: SHIPPED | OUTPATIENT
Start: 2022-08-25 | End: 2022-11-02

## 2022-09-12 NOTE — PROGRESS NOTES
NEUROLOGY NOTE     Chief Complaint   Patient presents with    Seizure       SUBJECTIVE:  EGD today  Follows commands  No seizures    HPI  Tip Cook is a 37 y.o. female who presents to the hospital because of  seizures. 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 Patient was conducting her daily chores in the group home where she lives. She was found to be sitting on the floor when she was checked upon and she began to have seizure-like activity and foaming at the mouth. Patient has had a total of 4 seizures with no return to her baseline mental status. Recently her dosage of Trileptal was decreased from 600 mg p.o. twice daily to 300 mg p.o. twice daily as the patient was seizure-free for around 6 years. Patient has been intubated. Patient was loaded with 1 g of Keppra and is on a propofol drip. ROS  A ten system review of constitutional, cardiovascular, respiratory, musculoskeletal, endocrine, skin, SHEENT, genitourinary, psychiatric and neurologic systems was obtained and is unremarkable except as stated in HPI     PMH  Past Medical History:   Diagnosis Date    Brain damage 1983    related to live vaccination for Tdap    Nausea & vomiting     Neurological disorder 1983    Epilepsy    Seizures ([facility]     last sz at 22years old 08/15/2018       [facility]  History reviewed. No pertinent family history.       Social History     Socioeconomic History    Marital status: SINGLE     Spouse name: Not on file    Number of children: Not on file    Years of education: Not on file    Highest education level: Not on file   Tobacco Use    Smoking status: Never Smoker    Smokeless tobacco: Never Used   Substance and Sexual Activity    Alcohol use: No       ALLERGIES  Allergies   Allergen Reactions    Bees [Sting, Bee] Anaphylaxis    Pertussis Vaccine,Adsorbed Other (comments)       PHYSICAL EXAMINATION:   Patient Vitals for the past 24 hrs:   Temp Pulse Resp BP SpO2   04/08/19 1215 -- 66 16 -- --   04/08/19 1200 -- 75 17 138/85 --   04/08/19 1145 -- 66 16 -- --   04/08/19 1138 -- 65 16 -- 99 %   04/08/19 1130 -- 63 16 -- --   04/08/19 1115 -- 69 16 -- --   04/08/19 1100 -- 67 16 143/84 --   04/08/19 1045 -- 66 16 -- --   04/08/19 1030 -- 83 16 -- --   04/08/19 1015 -- 82 16 -- 100 %   04/08/19 1000 -- 74 16 137/88 100 %   04/08/19 0945 -- 60 16 -- 100 %   04/08/19 0930 -- 66 16 -- 100 %   04/08/19 0915 -- 69 16 -- 100 %   04/08/19 0900 -- 63 16 147/88 --   04/08/19 0845 -- 61 16 -- 100 %   04/08/19 0830 -- 64 16 -- 100 %   04/08/19 0815 -- 67 16 -- 100 %   04/08/19 0800 -- 85 24 137/83 100 %   04/08/19 0759 -- 84 17 -- 100 %   04/08/19 0745 -- (!) 101 -- -- 100 %   04/08/19 0731 98.1 °F (36.7 °C) -- -- -- --   04/08/19 0730 -- 84 -- -- 99 %   04/08/19 0715 -- 83 -- -- 99 %   04/08/19 0700 -- 94 28 130/78 99 %   04/08/19 0634 -- (!) 29 14 -- 97 %   04/08/19 0628 -- 83 16 -- 98 %   04/08/19 0600 -- 70 16 127/70 100 %   04/08/19 0500 -- 84 16 122/65 95 %   04/08/19 0400 98.4 °F (36.9 °C) 79 16 118/63 100 %   04/08/19 0300 -- 73 16 118/63 98 %   04/08/19 0200 -- 78 16 108/60 98 %   04/08/19 0130 -- 82 18 -- 99 %   04/08/19 0100 -- 81 16 109/54 99 %   04/08/19 0042 -- 90 16 -- 99 %   04/08/19 0000 98.4 °F (36.9 °C) 81 16 115/58 100 %   04/07/19 2300 -- 73 16 109/58 100 %   04/07/19 2250 -- 75 16 -- 99 %   04/07/19 2200 -- 77 16 109/58 99 %   04/07/19 2100 -- 88 16 120/70 99 %   04/07/19 2044 -- 90 16 -- 99 %   04/07/19 2000 99.5 °F (37.5 °C) 92 16 114/62 100 %   04/07/19 1900 99.5 °F (37.5 °C) 87 16 112/61 99 %   04/07/19 1845 -- 88 16 -- 99 %   04/07/19 1830 -- 87 16 -- 99 %   04/07/19 1815 -- 88 16 -- 99 %   04/07/19 1800 -- 87 16 119/68 99 %   04/07/19 1745 -- 93 16 -- 99 %   04/07/19 1730 -- 90 16 -- 100 %   04/07/19 1715 -- 95 16 -- (!) 73 %   04/07/19 1700 -- 92 16 136/81 98 %   04/07/19 1645 -- 99 18 -- 98 %   04/07/19 1630 -- 96 16 -- 98 %   04/07/19 1615 -- 92 16 -- 97 %   04/07/19 1600 -- 93 16 116/68 97 %   04/07/19 1545 -- 92 17 -- 99 %   04/07/19 1534 98.8 °F (37.1 °C) 86 16 -- 96 %   04/07/19 1530 -- 85 16 -- 96 %   04/07/19 1525 -- 84 16 -- 96 %   04/07/19 1524 -- -- 16 -- --   04/07/19 1515 -- 89 16 -- --   04/07/19 1500 -- 89 16 118/65 --   04/07/19 1447 -- -- 16 -- --   04/07/19 1445 -- 91 16 -- --   04/07/19 1430 -- 91 16 -- --   04/07/19 1415 -- (!) 103 16 -- --   04/07/19 1400 -- (!) 104 16 126/80 --   04/07/19 1345 -- (!) 110 16 -- --   04/07/19 1330 -- (!) 112 16 -- 99 %   04/07/19 1315 -- 100 16 -- 99 %   04/07/19 1300 -- 100 16 122/73 99 %   04/07/19 1245 -- (!) 103 16 -- 100 %   04/07/19 1240 98.8 °F (37.1 °C) (!) 104 16 -- 100 %   04/07/19 1230 -- 97 16 -- --        General:   General appearance: Pt is in no acute distress   Distal pulses are preserved    Neurological Examination:   Mental Status: Alert and awake and follows commands. Cranial Nerves: Visual fields are full. PERRL, Extraocular movements are full.      Motor: symmetric    Sensation: Normal to pain    LAB DATA REVIEWED:    Recent Results (from the past 24 hour(s))   HGB & HCT    Collection Time: 04/07/19  3:45 PM   Result Value Ref Range    HGB 11.7 11.5 - 16.0 g/dL    HCT 34.2 (L) 35.0 - 47.0 %   PROTHROMBIN TIME + INR    Collection Time: 04/07/19  3:45 PM   Result Value Ref Range    INR 1.1 0.9 - 1.1      Prothrombin time 10.9 9.0 - 11.1 sec   GLUCOSE, POC    Collection Time: 04/07/19  7:30 PM   Result Value Ref Range    Glucose (POC) 79 65 - 100 mg/dL    Performed by Joana Collins    GLUCOSE, POC    Collection Time: 04/07/19  7:31 PM   Result Value Ref Range    Glucose (POC) 80 65 - 100 mg/dL    Performed by Joana RIDDLE, POC    Collection Time: 04/07/19  7:51 PM   Result Value Ref Range    Glucose (POC) 150 (H) 65 - 100 mg/dL    Performed by [address] [place] [address], POC    Collection Time: 04/07/19 11:58 PM   Result Value Ref Range Glucose (POC) 76 65 - 100 mg/dL    Performed by Roseanna Mealing    GLUCOSE, POC    Collection Time: 04/08/19 12:32 AM   Result Value Ref Range    Glucose (POC) 123 (H) 65 - 100 mg/dL    Performed by Roseanna Mealing    CBC WITH AUTOMATED DIFF    Collection Time: 04/08/19  4:54 AM   Result Value Ref Range    WBC 10.9 3.6 - 11.0 K/uL    RBC 3.80 3.80 - 5.20 M/uL    HGB 10.9 (L) 11.5 - 16.0 g/dL    HCT 31.8 (L) 35.0 - 47.0 %    MCV 83.7 80.0 - 99.0 FL    MCH 28.7 26.0 - 34.0 PG    MCHC 34.3 30.0 - 36.5 g/dL    RDW 13.5 11.5 - 14.5 %    PLATELET 841 131 - 484 K/uL    MPV 9.5 8.9 - 12.9 FL    NRBC 0.0 0  WBC    ABSOLUTE NRBC 0.00 0.00 - 0.01 K/uL    NEUTROPHILS 74 32 - 75 %    LYMPHOCYTES 16 12 - 49 %    MONOCYTES 9 5 - 13 %    EOSINOPHILS 1 0 - 7 %    BASOPHILS 0 0 - 1 %    IMMATURE GRANULOCYTES 0 0.0 - 0.5 %    ABS. NEUTROPHILS 8.0 1.8 - 8.0 K/UL    ABS. LYMPHOCYTES 1.8 0.8 - 3.5 K/UL    ABS. MONOCYTES 1.0 0.0 - 1.0 K/UL    ABS. EOSINOPHILS 0.1 0.0 - 0.4 K/UL    ABS. BASOPHILS 0.0 0.0 - 0.1 K/UL    ABS. IMM.  GRANS. 0.0 0.00 - 0.04 K/UL    DF AUTOMATED     METABOLIC PANEL, BASIC    Collection Time: 04/08/19  4:54 AM   Result Value Ref Range    Sodium 139 136 - 145 mmol/L    Potassium 3.0 (L) 3.5 - 5.1 mmol/L    Chloride 112 (H) 97 - 108 mmol/L    CO2 17 (L) 21 - 32 mmol/L    Anion gap 10 5 - 15 mmol/L    Glucose 105 (H) 65 - 100 mg/dL    BUN 5 (L) 6 - 20 MG/DL    Creatinine 0.43 (L) 0.55 - 1.02 MG/DL    BUN/Creatinine ratio 12 12 - 20      GFR est AA >60 >60 ml/min/1.73m2    GFR est non-AA >60 >60 ml/min/1.73m2    Calcium 7.8 (L) 8.5 - 10.1 MG/DL   PHOSPHORUS    Collection Time: 04/08/19  4:54 AM   Result Value Ref Range    Phosphorus 3.0 2.6 - 4.7 MG/DL   MAGNESIUM    Collection Time: 04/08/19  4:54 AM   Result Value Ref Range    Magnesium 2.1 1.6 - 2.4 mg/dL   POC G3 - PUL    Collection Time: 04/08/19  4:54 AM   Result Value Ref Range    FIO2 (POC) 40 %    pH (POC) 7.405 7.35 - 7.45      pCO2 (POC) 25.2 (L) 35.0 - 45.0 MMHG    pO2 (POC) 171 (H) 80 - 100 MMHG    HCO3 (POC) 15.8 (L) 22 - 26 MMOL/L    sO2 (POC) 100 (H) 92 - 97 %    Base deficit (POC) 9 mmol/L    Site LEFT RADIAL      Device: VENT      Mode ASSIST CONTROL      Tidal volume 500 ml    Set Rate 16 bpm    PEEP/CPAP (POC) 6 cmH2O    Allens test (POC) YES      Specimen type (POC) ARTERIAL      Total resp. rate 16     GLUCOSE, POC    Collection Time: 19  6:07 AM   Result Value Ref Range    Glucose (POC) 109 (H) 65 - 100 mg/dL    Performed by Ramez Coffey         Imaging review:  CT head  Normal    HOME MEDS  Prior to Admission Medications   Prescriptions Last Dose Informant Patient Reported? Taking? OXcarbazepine (TRILEPTAL) 300 mg tablet   No Yes   Sig: Take 1 Tab by mouth two (2) times a day. fluticasone propionate (FLONASE) 50 mcg/actuation nasal spray   Yes Yes   Si Sprays by Both Nostrils route daily. loratadine (CLARITIN) 10 mg tablet   Yes Yes   Sig: Take 10 mg by mouth.   medroxyPROGESTERone (DEPO-PROVERA) 150 mg/mL injection   Yes Yes   Si mg by IntraMUSCular route once. norgestimate-ethinyl estradiol (TRINESSA, 28,) 0.18/0.215/0.25 mg-35 mcg (28) tab   Yes Yes   Sig: Take  by mouth.       Facility-Administered Medications: None       CURRENT MEDS  Current Facility-Administered Medications   Medication Dose Route Frequency    dextrose 5 % - 0.45% NaCl infusion  125 mL/hr IntraVENous CONTINUOUS    potassium chloride 10 mEq in 100 ml IVPB  10 mEq IntraVENous Q2H    levETIRAcetam in saline (iso-os) (KEPPRA) infusion 1,000 mg  1,000 mg IntraVENous Q12H    propofol (DIPRIVAN) infusion  0-50 mcg/kg/min IntraVENous TITRATE    sodium chloride (NS) flush 5-40 mL  5-40 mL IntraVENous Q8H    insulin lispro (HUMALOG) injection   SubCUTAneous Q6H    mupirocin (BACTROBAN) 2 % ointment   Both Nostrils Q12H    chlorhexidine (PERIDEX) 0.12 % mouthwash 15 mL  15 mL Oral Q12H    pantoprazole (PROTONIX) 40 mg in sodium chloride 0.9% 10 mL injection 40 mg IntraVENous Q12H       IMPRESSION:  Debi Apple is a 37 y.o. female who presents with status epilepticus. Patient has history of West syndrome. Last seizure was in 13 yrs ago. Patient is intubated now. Patient was taking Trileptal 300 mg p.o. twice daily which was recently decreased from 600 mg p.o. twice daily as the patient was seizure-free for around 13 years. Seizures clinically seems to have resolved. RECOMMENDATIONS:  1. Cont Keppra 1 g IV twice daily and can be switched to PO once extubated  2. EEG  3.  Seizure precautions    Will read EEG once done. No further neuro rosas. Call with questions. 30 min cct provided.     Gabriel Torrez MD  Neurologist 7

## 2022-11-02 DIAGNOSIS — G40.909 NONINTRACTABLE EPILEPSY WITHOUT STATUS EPILEPTICUS, UNSPECIFIED EPILEPSY TYPE (HCC): ICD-10-CM

## 2022-11-02 RX ORDER — OXCARBAZEPINE 300 MG/1
TABLET, FILM COATED ORAL
Qty: 180 TABLET | Refills: 0 | Status: SHIPPED | OUTPATIENT
Start: 2022-11-02 | End: 2022-11-27

## 2022-11-17 NOTE — PROGRESS NOTES
2000 Becomes very agitated when talked to or touched. Propofol infusing at 50 mcg/kg/min. Will limit interactions as much as possible. 2335 Pulled garcia catheter out with her foot. 1500 [id number] garcia catheter inserted without problems. 0400 Following commands. Less agitated   0600 Propofol stopped for SAT.  0610 Passed SAT  0623 Failed SBT due to Increased RR.  0700 Bedside and Verbal shift change report given to Corina Meek. Report included the following information SBAR, Kardex, ED Summary, Procedure Summary, Intake/Output, MAR, Recent Results and Cardiac Rhythm NSR. no

## 2023-03-02 ENCOUNTER — OFFICE VISIT (OUTPATIENT)
Dept: NEUROLOGY | Age: 38
End: 2023-03-02
Payer: MEDICARE

## 2023-03-02 VITALS
BODY MASS INDEX: 29.27 KG/M2 | OXYGEN SATURATION: 97 % | SYSTOLIC BLOOD PRESSURE: 120 MMHG | HEIGHT: 63 IN | DIASTOLIC BLOOD PRESSURE: 80 MMHG | TEMPERATURE: 98 F | HEART RATE: 86 BPM | WEIGHT: 165.2 LBS | RESPIRATION RATE: 16 BRPM

## 2023-03-02 DIAGNOSIS — F41.9 ANXIETY: Primary | ICD-10-CM

## 2023-03-02 DIAGNOSIS — G40.909 NONINTRACTABLE EPILEPSY WITHOUT STATUS EPILEPTICUS, UNSPECIFIED EPILEPSY TYPE (HCC): ICD-10-CM

## 2023-03-02 PROCEDURE — 99214 OFFICE O/P EST MOD 30 MIN: CPT | Performed by: NURSE PRACTITIONER

## 2023-03-02 PROCEDURE — G8419 CALC BMI OUT NRM PARAM NOF/U: HCPCS | Performed by: NURSE PRACTITIONER

## 2023-03-02 PROCEDURE — G8427 DOCREV CUR MEDS BY ELIG CLIN: HCPCS | Performed by: NURSE PRACTITIONER

## 2023-03-02 PROCEDURE — G8432 DEP SCR NOT DOC, RNG: HCPCS | Performed by: NURSE PRACTITIONER

## 2023-03-02 RX ORDER — OXCARBAZEPINE 300 MG/1
TABLET, FILM COATED ORAL
Qty: 180 TABLET | Refills: 3 | Status: SHIPPED | OUTPATIENT
Start: 2023-03-02

## 2023-03-02 NOTE — PROGRESS NOTES
[facility]  [address] [facility] [address] [place] [address]  Sherri Gonzalez  Tel: [phone]  Fax: [phone]      Date:  23     Name:  Suni Christianson  :  1985  MRN:  [id number]     PCP:  Zeeshan Ferreira MD    Chief Complaint   Patient presents with    Follow-up     Chronic static encephalopathy with epilepsy        HISTORY OF PRESENT ILLNESS:  Patient presents today for regular follow up. Last seen 2022 with Dr Gavino York, patient did have a Seizure 2022 went to ER shortly afterwards, had the seizure while at home visiting her mother, 1 long seizure (unsure of the length of time)  and 2 short seizures. Maureen Dedham on the floor, no injuries. Notes a lot shaking and screaming at times. No incontinence. No issues or breakthrough seizures since then. Trileptal 900mg BID: takes as rx'ed, no side effects. She takes medications without difficulty, therefore compliance is 100%. No driving. Always thinking or worrying about things. Enjoys going out and Eating out. Does a little exercise. REVIEW OF SYSTEMS:     Review of Systems   Musculoskeletal:  Positive for falls (with the seizure in July). Neurological:  Positive for seizures. Negative for dizziness, tingling, tremors and headaches. Psychiatric/Behavioral:  Negative for depression. The patient is nervous/anxious. Insomnia: a little trouble sleeping. All other systems reviewed and are negative. Current Outpatient Medications   Medication Sig    inulin (FIBER GUMMIES PO) Take  by mouth. CRANBERRY PO Take  by mouth.    melatonin (MELATIN PO) Take  by mouth nightly. OXcarbazepine (TRILEPTAL) 300 mg tablet TAKE 3 TABLETS BY MOUTH TWICE DAILY     No current facility-administered medications for this visit.      Allergies   Allergen Reactions    Bees [Sting, Bee] Anaphylaxis    Pertussis Vaccine,Adsorbed Other (comments)     Past Medical History:   Diagnosis Date    Brain damage     related to live vaccination for Tdap    Nausea & vomiting     Neurological disorder 1983    Epilepsy    Seizures ([facility] [address]     last sz at 22years old 08/15/2018     Past Surgical History:   Procedure Laterality Date    HX BACK SURGERY      scoliosis rods and pins    HX HEENT Bilateral 1986    eye muscle tightening      Social History     Socioeconomic History    Marital status: SINGLE     Spouse name: Not on file    Number of children: Not on file    Years of education: Not on file    Highest education level: Not on file   Occupational History    Not on file   Tobacco Use    Smoking status: Never    Smokeless tobacco: Never   Vaping Use    Vaping Use: Never used   Substance and Sexual Activity    Alcohol use: No    Drug use: Not on file    Sexual activity: Not on file   Other Topics Concern    Not on file   Social History Narrative    Not on file     Social Determinants of Health     Financial Resource Strain: Not on file   Food Insecurity: Not on file   Transportation Needs: Not on file   Physical Activity: Not on file   Stress: Not on file   Social Connections: Not on file   Intimate Partner Violence: Not on file   Housing Stability: Not on file     Family History   Problem Relation Age of Onset    Heart Attack Father          PHYSICAL EXAMINATION:    Visit Vitals  /80 (BP 1 Location: Left upper arm, BP Patient Position: Sitting, BP Cuff Size: Large adult)   Pulse 86   Temp 98 °F (36.7 °C) (Temporal)   Resp 16   Ht 5' 2.5\" (1.588 m)   Wt 165 lb 3.2 oz (74.9 kg)   SpO2 97%   BMI 29.73 kg/m²       General:  Well defined, nourished, and well groomed individual in no acute distress. Musculoskeletal:  Extremities revealed no edema and had full range of motion of joints. Psych:  Good mood and bright affect, with her caregiver Mimi. NEUROLOGICAL EXAMINATION:     Mental Status:   Alert and oriented to person, place, and time with recent and remote memory intact. Attention span and concentration are normal. Clear speech.  Fund of knowledge preserved. Cranial Nerves:   PERRLA. Visual fields were full  EOM: no evidence of nystagmus  Facial sensation:  normal and symmetric  Facial motor: normal and symmetric, no facial droop noted. Hearing intact  SCM strength intact  Tongue: midline without fasciculations    Motor Examination: Normal tone. 4+ /5 muscle strength in bilateral upper extremities. No muscle wasting, no twitching or fasciculation noted. Sensory exam:  Normal throughout to light touch in bilateral upper extremities. Coordination:   Finger to nose and rapid arm movement testing was normal.  No resting or intention tremor. Negative Romberg, negative pronator drift. Gait and Station:  Steady gait, Normal arm swing. Reflexes:  DTRs 2+ in bilateral biceps, brachioradialis, patella . ASSESSMENT AND PLAN      ICD-10-CM ICD-9-CM    1. Anxiety  F41.9 300.00       2. Nonintractable epilepsy without status epilepticus, unspecified [facility]  G40.909 345.90 OXcarbazepine (TRILEPTAL) 300 mg tablet        1. Anxiety: I did mention a trial of a low-dose antianxiety medication, such as Lexapro, will defer a prescription today, she does have upcoming appointment with primary care soon, her caregiver will help discuss this with primary care at that appointment as well. Discussed other ways to help manage stress and anxiety level such as exercise and walking. 2. Nonintractable epilepsy without status epilepticus, unspecified epilepsy type [facility]): Patient did have the seizure in July, she has had no events since then, patient appears to be well controlled and compliant with current regimen which includes the Trileptal 300 mg taking 3 tablets twice a day, she denies any side effects or adverse reactions. Patient does not drive, she does live in a group home who monitors the patient closely and insurance patient is taking medication. Advised him to contact us if she has any additional breakthrough seizures.   Labs were checked while she was in the hospital no need to repeat at this time.  -     OXcarbazepine (TRILEPTAL) 300 mg tablet; 3 tablets BID, Normal, Disp-180 Tablet, R-3    Patient and/or family verbalized understand of all instructions and all questions/concerns were addressed. Safety/side effects of medications discussed. Patient remains a complex patient secondary to polypharmacy, significant comorbid conditions, and use of high-risk medications which complicate the decision making process related to patient's neurologic diagnosis. I will see the patient back in 1 year, sooner if needed.     Maurice Cason, FNP-BC

## 2023-03-02 NOTE — PROGRESS NOTES
Chief Complaint   Patient presents with    Follow-up     Chronic static encephalopathy with epilepsy      1. Have you been to the ER, urgent care clinic since your last visit? Yes Hospitalized since your last visit? Yes due to seizure in [facility]    2. Have you seen or consulted any other health care providers outside of the [address] [place] [address] since your last visit? Yes PCP & E ye exam Include any pap smears or colon screening.  No     Patient here with Mimi from the [facility]

## 2023-07-03 RX ORDER — OXCARBAZEPINE 300 MG/1
TABLET, FILM COATED ORAL
Qty: 180 TABLET | Refills: 8 | Status: SHIPPED | OUTPATIENT
Start: 2023-07-03

## (undated) DEVICE — BAG SPEC BIOHZRD 10 X 10 IN --

## (undated) DEVICE — KENDALL DL ECG CABLE AND LEAD WIRE SYSTEM, 3-LEAD, SINGLE PATIENT USE: Brand: KENDALL

## (undated) DEVICE — D&C/GYN-LF: Brand: MEDLINE INDUSTRIES, INC.

## (undated) DEVICE — SYR 10ML LUER LOK 1/5ML GRAD --

## (undated) DEVICE — Z DISCONTINUED NO SUB IDED KIT ENDOMET ABLAT IMPED CTRL DEV W/ RF CTRL FTSWCH SUCT LN

## (undated) DEVICE — Device

## (undated) DEVICE — MEDI-VAC NON-CONDUCTIVE SUCTION TUBING: Brand: CARDINAL HEALTH

## (undated) DEVICE — SYR 3ML LL TIP 1/10ML GRAD --

## (undated) DEVICE — NEEDLE SPNL 22GA L3.5IN BLK HUB S STL REG WALL FIT STYL W/

## (undated) DEVICE — SPECIMEN SOCK - STANDARD: Brand: MEDI-VAC

## (undated) DEVICE — STERILE POLYISOPRENE POWDER-FREE SURGICAL GLOVES: Brand: PROTEXIS

## (undated) DEVICE — TUBING IRRIG L77IN DIA0.241IN L BOR FOR CYSTO W/ NVENT

## (undated) DEVICE — TOWEL 4 PLY TISS 19X30 SUE WHT

## (undated) DEVICE — SET ADMIN 16ML TBNG L100IN 2 Y INJ SITE IV PIGGY BK DISP

## (undated) DEVICE — SOLUTION IRRIG 3000ML 0.9% SOD CHL FLX CONT 0797208] ICU MEDICAL INC]

## (undated) DEVICE — LIGHT HANDLE: Brand: DEVON

## (undated) DEVICE — NEEDLE HYPO 18GA L1.5IN PNK S STL HUB POLYPR SHLD REG BVL

## (undated) DEVICE — MEDI-VAC YANK SUCT HNDL W/TPRD BULBOUS TIP: Brand: CARDINAL HEALTH

## (undated) DEVICE — CONTAINER SPEC 20 ML LID NEUT BUFF FORMALIN 10 % POLYPR STS

## (undated) DEVICE — 3M™ TEGADERM™ TRANSPARENT FILM DRESSING FRAME STYLE, 1624W, 2-3/8 IN X 2-3/4 IN (6 CM X 7 CM), 100/CT 4CT/CASE: Brand: 3M™ TEGADERM™

## (undated) DEVICE — SOLUTION LACTATED RINGERS INJECTION USP

## (undated) DEVICE — KENDALL RADIOLUCENT FOAM MONITORING ELECTRODE RECTANGULAR SHAPE: Brand: KENDALL

## (undated) DEVICE — 4-PORT MANIFOLD: Brand: NEPTUNE 2

## (undated) DEVICE — 1200 GUARD II KIT W/5MM TUBE W/O VAC TUBE: Brand: GUARDIAN

## (undated) DEVICE — PREP PAD BNS: Brand: CONVERTORS

## (undated) DEVICE — INFECTION CONTROL KIT SYS

## (undated) DEVICE — GOWN,SIRUS,FABRNF,XL,20/CS: Brand: MEDLINE

## (undated) DEVICE — SOLIDIFIER MEDC 1200ML -- CONVERT TO 356117

## (undated) DEVICE — BASIN EMSIS 16OZ GRAPHITE PLAS KID SHP MOLD GRAD FOR ORAL

## (undated) DEVICE — NEONATAL-ADULT SPO2 SENSOR: Brand: NELLCOR

## (undated) DEVICE — FORCEPS BX L160CM DIA8MM GRSP DISECT CUP TIP NONLOCKING ROT

## (undated) DEVICE — CATH IV AUTOGRD BC PNK 20GA 25 -- INSYTE

## (undated) DEVICE — BLOCK BITE ENDOSCP AD 21 MM W/ DIL BLU LF DISP

## (undated) DEVICE — CONTINU-FLO SOLUTION SET, 2 INJECTION SITES, MALE LUER LOCK ADAPTER WITH RETRACTABLE COLLAR, LARGE BORE STOPCOCK WITH ROTATING MALE LUER LOCK EXTENSION SET, 2 INJECTION SITES, MALE LUER LOCK ADAPTER WITH RETRACTABLE COLLAR: Brand: INTERLINK/CONTINU-FLO

## (undated) DEVICE — SOLUTION IV STRL H2O 500 ML AQUALITE POUR BTL

## (undated) DEVICE — Z DISCONTINUED PER MEDLINE LINE GAS SAMPLING O2/CO2 LNG AD 13 FT NSL W/ TBNG FILTERLINE